# Patient Record
Sex: FEMALE | Race: WHITE | Employment: PART TIME | URBAN - METROPOLITAN AREA
[De-identification: names, ages, dates, MRNs, and addresses within clinical notes are randomized per-mention and may not be internally consistent; named-entity substitution may affect disease eponyms.]

---

## 2017-02-10 ENCOUNTER — OFFICE VISIT (OUTPATIENT)
Dept: FAMILY MEDICINE CLINIC | Age: 67
End: 2017-02-10

## 2017-02-10 VITALS
HEART RATE: 83 BPM | DIASTOLIC BLOOD PRESSURE: 74 MMHG | SYSTOLIC BLOOD PRESSURE: 122 MMHG | TEMPERATURE: 97.8 F | HEIGHT: 65 IN | WEIGHT: 200.6 LBS | BODY MASS INDEX: 33.42 KG/M2 | OXYGEN SATURATION: 97 %

## 2017-02-10 DIAGNOSIS — H60.501 ACUTE OTITIS EXTERNA OF RIGHT EAR, UNSPECIFIED TYPE: Primary | ICD-10-CM

## 2017-02-10 DIAGNOSIS — H10.9 CONJUNCTIVITIS OF BOTH EYES, UNSPECIFIED CONJUNCTIVITIS TYPE: ICD-10-CM

## 2017-02-10 PROCEDURE — G8484 FLU IMMUNIZE NO ADMIN: HCPCS | Performed by: NURSE PRACTITIONER

## 2017-02-10 PROCEDURE — G8427 DOCREV CUR MEDS BY ELIG CLIN: HCPCS | Performed by: NURSE PRACTITIONER

## 2017-02-10 PROCEDURE — G8417 CALC BMI ABV UP PARAM F/U: HCPCS | Performed by: NURSE PRACTITIONER

## 2017-02-10 PROCEDURE — 99213 OFFICE O/P EST LOW 20 MIN: CPT | Performed by: NURSE PRACTITIONER

## 2017-02-10 PROCEDURE — G8400 PT W/DXA NO RESULTS DOC: HCPCS | Performed by: NURSE PRACTITIONER

## 2017-02-10 PROCEDURE — 1090F PRES/ABSN URINE INCON ASSESS: CPT | Performed by: NURSE PRACTITIONER

## 2017-02-10 PROCEDURE — 3017F COLORECTAL CA SCREEN DOC REV: CPT | Performed by: NURSE PRACTITIONER

## 2017-02-10 PROCEDURE — 1123F ACP DISCUSS/DSCN MKR DOCD: CPT | Performed by: NURSE PRACTITIONER

## 2017-02-10 PROCEDURE — 4130F TOPICAL PREP RX AOE: CPT | Performed by: NURSE PRACTITIONER

## 2017-02-10 PROCEDURE — 4040F PNEUMOC VAC/ADMIN/RCVD: CPT | Performed by: NURSE PRACTITIONER

## 2017-02-10 PROCEDURE — 3014F SCREEN MAMMO DOC REV: CPT | Performed by: NURSE PRACTITIONER

## 2017-02-10 PROCEDURE — 1036F TOBACCO NON-USER: CPT | Performed by: NURSE PRACTITIONER

## 2017-02-10 RX ORDER — POLYMYXIN B SULFATE AND TRIMETHOPRIM 1; 10000 MG/ML; [USP'U]/ML
1 SOLUTION OPHTHALMIC EVERY 4 HOURS
Qty: 1 BOTTLE | Refills: 0 | Status: SHIPPED | OUTPATIENT
Start: 2017-02-10 | End: 2017-02-20

## 2017-02-10 RX ORDER — AMOXICILLIN AND CLAVULANATE POTASSIUM 875; 125 MG/1; MG/1
1 TABLET, FILM COATED ORAL 2 TIMES DAILY
Qty: 20 TABLET | Refills: 0 | Status: SHIPPED | OUTPATIENT
Start: 2017-02-10 | End: 2017-02-20

## 2017-02-10 RX ORDER — CIPROFLOXACIN AND DEXAMETHASONE 3; 1 MG/ML; MG/ML
4 SUSPENSION/ DROPS AURICULAR (OTIC) 2 TIMES DAILY
Qty: 1 BOTTLE | Refills: 0 | Status: SHIPPED | OUTPATIENT
Start: 2017-02-10 | End: 2017-02-17

## 2017-02-10 ASSESSMENT — ENCOUNTER SYMPTOMS
DIARRHEA: 0
STRIDOR: 0
RHINORRHEA: 0
PHOTOPHOBIA: 0
VOMITING: 0
EYE DISCHARGE: 1
SWOLLEN GLANDS: 0
EYE ITCHING: 0
SORE THROAT: 0
ABDOMINAL PAIN: 0
RESPIRATORY NEGATIVE: 1
EYE REDNESS: 1
DOUBLE VISION: 0
EYE PAIN: 0
COUGH: 0
ALLERGIC/IMMUNOLOGIC NEGATIVE: 1
GASTROINTESTINAL NEGATIVE: 1

## 2017-02-14 LAB
GRAM STAIN RESULT: ABNORMAL
ORGANISM: ABNORMAL
ORGANISM: ABNORMAL
WOUND/ABSCESS: ABNORMAL

## 2017-02-17 ENCOUNTER — PATIENT MESSAGE (OUTPATIENT)
Dept: FAMILY MEDICINE CLINIC | Age: 67
End: 2017-02-17

## 2017-02-18 ENCOUNTER — PATIENT MESSAGE (OUTPATIENT)
Dept: FAMILY MEDICINE CLINIC | Age: 67
End: 2017-02-18

## 2017-02-18 RX ORDER — CIPROFLOXACIN 500 MG/1
500 TABLET, FILM COATED ORAL 2 TIMES DAILY
Qty: 14 TABLET | Refills: 0 | Status: SHIPPED | OUTPATIENT
Start: 2017-02-18 | End: 2017-02-20 | Stop reason: SDUPTHER

## 2017-02-20 RX ORDER — CIPROFLOXACIN 500 MG/1
500 TABLET, FILM COATED ORAL 2 TIMES DAILY
Qty: 14 TABLET | Refills: 0 | Status: SHIPPED | OUTPATIENT
Start: 2017-02-20 | End: 2017-02-26 | Stop reason: SDUPTHER

## 2017-02-24 ENCOUNTER — PATIENT MESSAGE (OUTPATIENT)
Dept: FAMILY MEDICINE CLINIC | Age: 67
End: 2017-02-24

## 2017-02-26 RX ORDER — CIPROFLOXACIN 500 MG/1
500 TABLET, FILM COATED ORAL 2 TIMES DAILY
Qty: 14 TABLET | Refills: 0 | Status: SHIPPED | OUTPATIENT
Start: 2017-02-26 | End: 2017-03-07 | Stop reason: SDUPTHER

## 2017-03-06 ENCOUNTER — PATIENT MESSAGE (OUTPATIENT)
Dept: FAMILY MEDICINE CLINIC | Age: 67
End: 2017-03-06

## 2017-03-06 DIAGNOSIS — H60.319 CHRONIC DIFFUSE OTITIS EXTERNA, UNSPECIFIED LATERALITY: Primary | ICD-10-CM

## 2017-03-07 RX ORDER — CIPROFLOXACIN 500 MG/1
500 TABLET, FILM COATED ORAL 2 TIMES DAILY
Qty: 14 TABLET | Refills: 0 | Status: SHIPPED | OUTPATIENT
Start: 2017-03-07 | End: 2017-03-14

## 2017-04-10 ENCOUNTER — OFFICE VISIT (OUTPATIENT)
Dept: FAMILY MEDICINE CLINIC | Age: 67
End: 2017-04-10

## 2017-04-10 ENCOUNTER — OFFICE VISIT (OUTPATIENT)
Dept: ENT CLINIC | Age: 67
End: 2017-04-10

## 2017-04-10 VITALS
TEMPERATURE: 97.4 F | DIASTOLIC BLOOD PRESSURE: 73 MMHG | HEIGHT: 64 IN | WEIGHT: 199 LBS | BODY MASS INDEX: 33.97 KG/M2 | SYSTOLIC BLOOD PRESSURE: 118 MMHG

## 2017-04-10 VITALS
TEMPERATURE: 98.1 F | HEART RATE: 88 BPM | WEIGHT: 199.6 LBS | BODY MASS INDEX: 34.26 KG/M2 | SYSTOLIC BLOOD PRESSURE: 126 MMHG | OXYGEN SATURATION: 94 % | DIASTOLIC BLOOD PRESSURE: 86 MMHG

## 2017-04-10 DIAGNOSIS — Z11.4 SCREENING FOR HIV (HUMAN IMMUNODEFICIENCY VIRUS): ICD-10-CM

## 2017-04-10 DIAGNOSIS — Z11.59 NEED FOR HEPATITIS C SCREENING TEST: ICD-10-CM

## 2017-04-10 DIAGNOSIS — E53.8 VITAMIN B 12 DEFICIENCY: ICD-10-CM

## 2017-04-10 DIAGNOSIS — E78.2 MIXED HYPERLIPIDEMIA: ICD-10-CM

## 2017-04-10 DIAGNOSIS — H60.393 OTITIS EXTERNA, CHRONIC INFECTIVE, BILATERAL: Primary | ICD-10-CM

## 2017-04-10 DIAGNOSIS — G47.33 OBSTRUCTIVE SLEEP APNEA (ADULT) (PEDIATRIC): Primary | ICD-10-CM

## 2017-04-10 DIAGNOSIS — I10 ESSENTIAL HYPERTENSION: ICD-10-CM

## 2017-04-10 PROCEDURE — G8417 CALC BMI ABV UP PARAM F/U: HCPCS | Performed by: FAMILY MEDICINE

## 2017-04-10 PROCEDURE — 1036F TOBACCO NON-USER: CPT | Performed by: OTOLARYNGOLOGY

## 2017-04-10 PROCEDURE — G8400 PT W/DXA NO RESULTS DOC: HCPCS | Performed by: FAMILY MEDICINE

## 2017-04-10 PROCEDURE — 1123F ACP DISCUSS/DSCN MKR DOCD: CPT | Performed by: FAMILY MEDICINE

## 2017-04-10 PROCEDURE — 99214 OFFICE O/P EST MOD 30 MIN: CPT | Performed by: OTOLARYNGOLOGY

## 2017-04-10 PROCEDURE — 3017F COLORECTAL CA SCREEN DOC REV: CPT | Performed by: FAMILY MEDICINE

## 2017-04-10 PROCEDURE — 3014F SCREEN MAMMO DOC REV: CPT | Performed by: OTOLARYNGOLOGY

## 2017-04-10 PROCEDURE — 1036F TOBACCO NON-USER: CPT | Performed by: FAMILY MEDICINE

## 2017-04-10 PROCEDURE — 1090F PRES/ABSN URINE INCON ASSESS: CPT | Performed by: OTOLARYNGOLOGY

## 2017-04-10 PROCEDURE — G8400 PT W/DXA NO RESULTS DOC: HCPCS | Performed by: OTOLARYNGOLOGY

## 2017-04-10 PROCEDURE — G8427 DOCREV CUR MEDS BY ELIG CLIN: HCPCS | Performed by: FAMILY MEDICINE

## 2017-04-10 PROCEDURE — 4130F TOPICAL PREP RX AOE: CPT | Performed by: OTOLARYNGOLOGY

## 2017-04-10 PROCEDURE — 99214 OFFICE O/P EST MOD 30 MIN: CPT | Performed by: FAMILY MEDICINE

## 2017-04-10 PROCEDURE — 4040F PNEUMOC VAC/ADMIN/RCVD: CPT | Performed by: FAMILY MEDICINE

## 2017-04-10 PROCEDURE — 1123F ACP DISCUSS/DSCN MKR DOCD: CPT | Performed by: OTOLARYNGOLOGY

## 2017-04-10 PROCEDURE — G8417 CALC BMI ABV UP PARAM F/U: HCPCS | Performed by: OTOLARYNGOLOGY

## 2017-04-10 PROCEDURE — 4040F PNEUMOC VAC/ADMIN/RCVD: CPT | Performed by: OTOLARYNGOLOGY

## 2017-04-10 PROCEDURE — 3017F COLORECTAL CA SCREEN DOC REV: CPT | Performed by: OTOLARYNGOLOGY

## 2017-04-10 PROCEDURE — 3014F SCREEN MAMMO DOC REV: CPT | Performed by: FAMILY MEDICINE

## 2017-04-10 PROCEDURE — G8427 DOCREV CUR MEDS BY ELIG CLIN: HCPCS | Performed by: OTOLARYNGOLOGY

## 2017-04-10 PROCEDURE — 1090F PRES/ABSN URINE INCON ASSESS: CPT | Performed by: FAMILY MEDICINE

## 2017-04-12 ASSESSMENT — ENCOUNTER SYMPTOMS
RESPIRATORY NEGATIVE: 1
EYES NEGATIVE: 1
GASTROINTESTINAL NEGATIVE: 1

## 2017-04-26 DIAGNOSIS — Z11.59 NEED FOR HEPATITIS C SCREENING TEST: ICD-10-CM

## 2017-04-26 DIAGNOSIS — E78.2 MIXED HYPERLIPIDEMIA: ICD-10-CM

## 2017-04-26 DIAGNOSIS — E53.8 VITAMIN B 12 DEFICIENCY: ICD-10-CM

## 2017-04-26 DIAGNOSIS — Z11.4 SCREENING FOR HIV (HUMAN IMMUNODEFICIENCY VIRUS): ICD-10-CM

## 2017-04-26 DIAGNOSIS — I10 ESSENTIAL HYPERTENSION: ICD-10-CM

## 2017-04-26 LAB
A/G RATIO: 1.5 (ref 1.1–2.2)
ALBUMIN SERPL-MCNC: 4.4 G/DL (ref 3.4–5)
ALP BLD-CCNC: 93 U/L (ref 40–129)
ALT SERPL-CCNC: 22 U/L (ref 10–40)
ANION GAP SERPL CALCULATED.3IONS-SCNC: 17 MMOL/L (ref 3–16)
AST SERPL-CCNC: 20 U/L (ref 15–37)
BILIRUB SERPL-MCNC: 0.5 MG/DL (ref 0–1)
BUN BLDV-MCNC: 20 MG/DL (ref 7–20)
CALCIUM SERPL-MCNC: 9.9 MG/DL (ref 8.3–10.6)
CHLORIDE BLD-SCNC: 94 MMOL/L (ref 99–110)
CHOLESTEROL, TOTAL: 195 MG/DL (ref 0–199)
CO2: 28 MMOL/L (ref 21–32)
CREAT SERPL-MCNC: 0.9 MG/DL (ref 0.6–1.2)
FOLATE: 12.83 NG/ML (ref 4.78–24.2)
GFR AFRICAN AMERICAN: >60
GFR NON-AFRICAN AMERICAN: >60
GLOBULIN: 2.9 G/DL
GLUCOSE BLD-MCNC: 109 MG/DL (ref 70–99)
HDLC SERPL-MCNC: 53 MG/DL (ref 40–60)
HEPATITIS C ANTIBODY INTERPRETATION: NORMAL
LDL CHOLESTEROL CALCULATED: 114 MG/DL
POTASSIUM SERPL-SCNC: 4.1 MMOL/L (ref 3.5–5.1)
SODIUM BLD-SCNC: 139 MMOL/L (ref 136–145)
T4 FREE: 1.2 NG/DL (ref 0.9–1.8)
TOTAL PROTEIN: 7.3 G/DL (ref 6.4–8.2)
TRIGL SERPL-MCNC: 141 MG/DL (ref 0–150)
TSH SERPL DL<=0.05 MIU/L-ACNC: 1.8 UIU/ML (ref 0.27–4.2)
VITAMIN B-12: 430 PG/ML (ref 211–911)
VLDLC SERPL CALC-MCNC: 28 MG/DL

## 2017-04-28 LAB — HIV-1 AND HIV-2 ANTIBODIES: NEGATIVE

## 2017-05-24 ENCOUNTER — PATIENT MESSAGE (OUTPATIENT)
Dept: FAMILY MEDICINE CLINIC | Age: 67
End: 2017-05-24

## 2017-05-24 RX ORDER — ESCITALOPRAM OXALATE 20 MG/1
20 TABLET ORAL DAILY
Qty: 90 TABLET | Refills: 1 | Status: SHIPPED | OUTPATIENT
Start: 2017-05-24 | End: 2017-05-28 | Stop reason: SDUPTHER

## 2017-05-28 ENCOUNTER — PATIENT MESSAGE (OUTPATIENT)
Dept: FAMILY MEDICINE CLINIC | Age: 67
End: 2017-05-28

## 2017-05-28 RX ORDER — ESCITALOPRAM OXALATE 20 MG/1
20 TABLET ORAL DAILY
Qty: 90 TABLET | Refills: 1 | Status: SHIPPED | OUTPATIENT
Start: 2017-05-28 | End: 2019-02-22

## 2017-06-12 DIAGNOSIS — I10 ESSENTIAL HYPERTENSION: Primary | ICD-10-CM

## 2017-06-12 DIAGNOSIS — K21.9 GASTROESOPHAGEAL REFLUX DISEASE, ESOPHAGITIS PRESENCE NOT SPECIFIED: ICD-10-CM

## 2017-06-12 RX ORDER — TRIAMTERENE AND HYDROCHLOROTHIAZIDE 75; 50 MG/1; MG/1
1 TABLET ORAL DAILY
Qty: 90 TABLET | Refills: 1 | Status: SHIPPED | OUTPATIENT
Start: 2017-06-12 | End: 2017-11-24 | Stop reason: SDUPTHER

## 2017-06-12 RX ORDER — OMEPRAZOLE 20 MG/1
20 CAPSULE, DELAYED RELEASE ORAL DAILY
Qty: 90 CAPSULE | Refills: 1 | Status: SHIPPED | OUTPATIENT
Start: 2017-06-12 | End: 2017-11-24 | Stop reason: SDUPTHER

## 2017-06-12 RX ORDER — NEBIVOLOL 5 MG/1
5 TABLET ORAL DAILY
Qty: 90 TABLET | Refills: 1 | Status: SHIPPED | OUTPATIENT
Start: 2017-06-12 | End: 2017-11-24 | Stop reason: SDUPTHER

## 2017-06-12 RX ORDER — AMLODIPINE BESYLATE 10 MG/1
10 TABLET ORAL DAILY
Qty: 90 TABLET | Refills: 1 | Status: SHIPPED | OUTPATIENT
Start: 2017-06-12 | End: 2017-11-24 | Stop reason: SDUPTHER

## 2017-08-28 ENCOUNTER — OFFICE VISIT (OUTPATIENT)
Dept: FAMILY MEDICINE CLINIC | Age: 67
End: 2017-08-28

## 2017-08-28 VITALS
HEIGHT: 64 IN | BODY MASS INDEX: 33.29 KG/M2 | WEIGHT: 195 LBS | TEMPERATURE: 97.6 F | OXYGEN SATURATION: 97 % | DIASTOLIC BLOOD PRESSURE: 86 MMHG | SYSTOLIC BLOOD PRESSURE: 126 MMHG | HEART RATE: 81 BPM

## 2017-08-28 DIAGNOSIS — E53.8 VITAMIN B 12 DEFICIENCY: ICD-10-CM

## 2017-08-28 DIAGNOSIS — F32.0 MILD SINGLE CURRENT EPISODE OF MAJOR DEPRESSIVE DISORDER (HCC): ICD-10-CM

## 2017-08-28 DIAGNOSIS — Z23 NEED FOR PNEUMOCOCCAL VACCINATION: ICD-10-CM

## 2017-08-28 DIAGNOSIS — F41.9 ANXIETY: ICD-10-CM

## 2017-08-28 DIAGNOSIS — B00.9 HERPES SIMPLEX INFECTION: ICD-10-CM

## 2017-08-28 DIAGNOSIS — F51.04 PSYCHOPHYSIOLOGICAL INSOMNIA: ICD-10-CM

## 2017-08-28 DIAGNOSIS — R73.9 HYPERGLYCEMIA: ICD-10-CM

## 2017-08-28 DIAGNOSIS — Z00.00 ROUTINE GENERAL MEDICAL EXAMINATION AT A HEALTH CARE FACILITY: Primary | ICD-10-CM

## 2017-08-28 DIAGNOSIS — Z13.820 SCREENING FOR OSTEOPOROSIS: ICD-10-CM

## 2017-08-28 DIAGNOSIS — Z12.11 SCREEN FOR COLON CANCER: ICD-10-CM

## 2017-08-28 DIAGNOSIS — E55.9 VITAMIN D DEFICIENCY: ICD-10-CM

## 2017-08-28 DIAGNOSIS — L43.8 ORAL LICHEN PLANUS: ICD-10-CM

## 2017-08-28 DIAGNOSIS — E78.2 MIXED HYPERLIPIDEMIA: ICD-10-CM

## 2017-08-28 DIAGNOSIS — M15.9 GENERALIZED OSTEOARTHROSIS, INVOLVING MULTIPLE SITES: ICD-10-CM

## 2017-08-28 DIAGNOSIS — I10 ESSENTIAL HYPERTENSION: ICD-10-CM

## 2017-08-28 PROCEDURE — 90732 PPSV23 VACC 2 YRS+ SUBQ/IM: CPT | Performed by: FAMILY MEDICINE

## 2017-08-28 PROCEDURE — G0009 ADMIN PNEUMOCOCCAL VACCINE: HCPCS | Performed by: FAMILY MEDICINE

## 2017-08-28 PROCEDURE — G0438 PPPS, INITIAL VISIT: HCPCS | Performed by: FAMILY MEDICINE

## 2017-08-28 ASSESSMENT — ENCOUNTER SYMPTOMS
EYES NEGATIVE: 1
GASTROINTESTINAL NEGATIVE: 1
ALLERGIC/IMMUNOLOGIC NEGATIVE: 1
RESPIRATORY NEGATIVE: 1

## 2017-10-02 ENCOUNTER — TELEPHONE (OUTPATIENT)
Dept: FAMILY MEDICINE CLINIC | Age: 67
End: 2017-10-02

## 2017-10-02 NOTE — TELEPHONE ENCOUNTER
Pt called stating she hit her eye with her work [employer] and says theres a spot that looks like \"is bleeding\"  Appt made for tomorrow at 4:15  Was unable to come in any earlier

## 2017-10-06 DIAGNOSIS — F32.0 MILD SINGLE CURRENT EPISODE OF MAJOR DEPRESSIVE DISORDER (HCC): ICD-10-CM

## 2017-10-06 DIAGNOSIS — I10 ESSENTIAL HYPERTENSION: ICD-10-CM

## 2017-10-06 DIAGNOSIS — E55.9 VITAMIN D DEFICIENCY: ICD-10-CM

## 2017-10-06 DIAGNOSIS — R73.9 HYPERGLYCEMIA: ICD-10-CM

## 2017-10-06 DIAGNOSIS — E78.2 MIXED HYPERLIPIDEMIA: ICD-10-CM

## 2017-10-06 DIAGNOSIS — E53.8 VITAMIN B 12 DEFICIENCY: ICD-10-CM

## 2017-10-06 LAB
A/G RATIO: 1.6 (ref 1.1–2.2)
ALBUMIN SERPL-MCNC: 4.2 G/DL (ref 3.4–5)
ALP BLD-CCNC: 98 U/L (ref 40–129)
ALT SERPL-CCNC: 23 U/L (ref 10–40)
ANION GAP SERPL CALCULATED.3IONS-SCNC: 17 MMOL/L (ref 3–16)
AST SERPL-CCNC: 22 U/L (ref 15–37)
BILIRUB SERPL-MCNC: 0.5 MG/DL (ref 0–1)
BUN BLDV-MCNC: 11 MG/DL (ref 7–20)
CALCIUM SERPL-MCNC: 9.6 MG/DL (ref 8.3–10.6)
CHLORIDE BLD-SCNC: 100 MMOL/L (ref 99–110)
CHOLESTEROL, TOTAL: 181 MG/DL (ref 0–199)
CO2: 29 MMOL/L (ref 21–32)
CREAT SERPL-MCNC: 0.7 MG/DL (ref 0.6–1.2)
GFR AFRICAN AMERICAN: >60
GFR NON-AFRICAN AMERICAN: >60
GLOBULIN: 2.7 G/DL
GLUCOSE BLD-MCNC: 105 MG/DL (ref 70–99)
HDLC SERPL-MCNC: 51 MG/DL (ref 40–60)
LDL CHOLESTEROL CALCULATED: 100 MG/DL
POTASSIUM SERPL-SCNC: 5.1 MMOL/L (ref 3.5–5.1)
SODIUM BLD-SCNC: 146 MMOL/L (ref 136–145)
TOTAL PROTEIN: 6.9 G/DL (ref 6.4–8.2)
TRIGL SERPL-MCNC: 152 MG/DL (ref 0–150)
TSH SERPL DL<=0.05 MIU/L-ACNC: 1.34 UIU/ML (ref 0.27–4.2)
VITAMIN B-12: 374 PG/ML (ref 211–911)
VITAMIN D 25-HYDROXY: 44.5 NG/ML
VLDLC SERPL CALC-MCNC: 30 MG/DL

## 2017-10-07 LAB
ESTIMATED AVERAGE GLUCOSE: 122.6 MG/DL
HBA1C MFR BLD: 5.9 %

## 2017-11-08 ENCOUNTER — TELEPHONE (OUTPATIENT)
Dept: FAMILY MEDICINE CLINIC | Age: 67
End: 2017-11-08

## 2017-11-08 DIAGNOSIS — M81.0 POST-MENOPAUSAL OSTEOPOROSIS: Primary | ICD-10-CM

## 2017-11-17 ENCOUNTER — HOSPITAL ENCOUNTER (OUTPATIENT)
Dept: GENERAL RADIOLOGY | Age: 67
Discharge: HOME | End: 2017-11-17
Attending: FAMILY MEDICINE | Admitting: FAMILY MEDICINE

## 2017-11-17 DIAGNOSIS — M81.0 AGE-RELATED OSTEOPOROSIS WITHOUT CURRENT PATHOLOGICAL FRACTURE: ICD-10-CM

## 2017-11-17 DIAGNOSIS — M81.0 POST-MENOPAUSAL OSTEOPOROSIS: ICD-10-CM

## 2017-11-24 DIAGNOSIS — K21.9 GASTROESOPHAGEAL REFLUX DISEASE, ESOPHAGITIS PRESENCE NOT SPECIFIED: ICD-10-CM

## 2017-11-24 DIAGNOSIS — I10 ESSENTIAL HYPERTENSION: ICD-10-CM

## 2017-11-25 RX ORDER — TRIAMTERENE AND HYDROCHLOROTHIAZIDE 75; 50 MG/1; MG/1
1 TABLET ORAL DAILY
Qty: 90 TABLET | Refills: 1 | Status: ON HOLD | OUTPATIENT
Start: 2017-11-25 | End: 2018-01-22 | Stop reason: HOSPADM

## 2017-11-25 RX ORDER — NEBIVOLOL 5 MG/1
5 TABLET ORAL DAILY
Qty: 90 TABLET | Refills: 1 | Status: SHIPPED | OUTPATIENT
Start: 2017-11-25 | End: 2018-08-03 | Stop reason: SDUPTHER

## 2017-11-25 RX ORDER — AMLODIPINE BESYLATE 10 MG/1
10 TABLET ORAL DAILY
Qty: 90 TABLET | Refills: 1 | Status: SHIPPED | OUTPATIENT
Start: 2017-11-25 | End: 2018-08-03 | Stop reason: SDUPTHER

## 2017-11-25 RX ORDER — OMEPRAZOLE 20 MG/1
20 CAPSULE, DELAYED RELEASE ORAL DAILY
Qty: 90 CAPSULE | Refills: 1 | Status: SHIPPED | OUTPATIENT
Start: 2017-11-25 | End: 2018-08-03 | Stop reason: SDUPTHER

## 2018-01-20 PROBLEM — S82.892A CLOSED LEFT ANKLE FRACTURE, INITIAL ENCOUNTER: Status: ACTIVE | Noted: 2018-01-20

## 2018-01-21 PROBLEM — S93.06XA: Status: ACTIVE | Noted: 2018-01-21

## 2018-01-21 PROBLEM — S52.122A RADIAL HEAD FRACTURE, CLOSED, LEFT, CLOSED, INITIAL ENCOUNTER: Status: ACTIVE | Noted: 2018-01-21

## 2018-01-21 PROBLEM — S82.853A: Status: ACTIVE | Noted: 2018-01-21

## 2018-01-22 PROBLEM — S82.892A CLOSED LEFT ANKLE FRACTURE, INITIAL ENCOUNTER: Status: ACTIVE | Noted: 2018-01-22

## 2018-01-30 ENCOUNTER — TELEPHONE (OUTPATIENT)
Dept: ORTHOPEDIC SURGERY | Age: 68
End: 2018-01-30

## 2018-01-30 RX ORDER — VALACYCLOVIR HYDROCHLORIDE 1 G/1
TABLET, FILM COATED ORAL
Qty: 36 TABLET | Refills: 1 | Status: SHIPPED | OUTPATIENT
Start: 2018-01-30 | End: 2019-02-22 | Stop reason: SDUPTHER

## 2018-01-30 NOTE — TELEPHONE ENCOUNTER
Spoke to patient. Informed her to be nonweightbearing. Continue to rest ice and elevate. Follow-up in office in next 1-2 weeks.

## 2018-01-31 RX ORDER — ONDANSETRON 4 MG/1
4 TABLET, FILM COATED ORAL EVERY 8 HOURS PRN
Qty: 20 TABLET | Refills: 0 | Status: SHIPPED | OUTPATIENT
Start: 2018-01-31 | End: 2019-02-22

## 2018-01-31 RX ORDER — CYCLOBENZAPRINE HCL 10 MG
10 TABLET ORAL 2 TIMES DAILY PRN
Qty: 60 TABLET | Refills: 0 | Status: SHIPPED | OUTPATIENT
Start: 2018-01-31 | End: 2018-05-31 | Stop reason: SDUPTHER

## 2018-02-05 ENCOUNTER — OFFICE VISIT (OUTPATIENT)
Dept: FAMILY MEDICINE CLINIC | Age: 68
End: 2018-02-05

## 2018-02-05 VITALS
WEIGHT: 195 LBS | HEART RATE: 60 BPM | TEMPERATURE: 98.6 F | BODY MASS INDEX: 34.54 KG/M2 | SYSTOLIC BLOOD PRESSURE: 136 MMHG | DIASTOLIC BLOOD PRESSURE: 86 MMHG | OXYGEN SATURATION: 99 %

## 2018-02-05 DIAGNOSIS — E53.8 VITAMIN B 12 DEFICIENCY: ICD-10-CM

## 2018-02-05 DIAGNOSIS — E78.2 MIXED HYPERLIPIDEMIA: ICD-10-CM

## 2018-02-05 DIAGNOSIS — K14.6 BURNING MOUTH SYNDROME: ICD-10-CM

## 2018-02-05 DIAGNOSIS — F32.0 MILD SINGLE CURRENT EPISODE OF MAJOR DEPRESSIVE DISORDER (HCC): Primary | ICD-10-CM

## 2018-02-05 DIAGNOSIS — I10 ESSENTIAL HYPERTENSION: ICD-10-CM

## 2018-02-05 PROCEDURE — G8417 CALC BMI ABV UP PARAM F/U: HCPCS | Performed by: FAMILY MEDICINE

## 2018-02-05 PROCEDURE — G8427 DOCREV CUR MEDS BY ELIG CLIN: HCPCS | Performed by: FAMILY MEDICINE

## 2018-02-05 PROCEDURE — 4040F PNEUMOC VAC/ADMIN/RCVD: CPT | Performed by: FAMILY MEDICINE

## 2018-02-05 PROCEDURE — 3014F SCREEN MAMMO DOC REV: CPT | Performed by: FAMILY MEDICINE

## 2018-02-05 PROCEDURE — 1036F TOBACCO NON-USER: CPT | Performed by: FAMILY MEDICINE

## 2018-02-05 PROCEDURE — G8399 PT W/DXA RESULTS DOCUMENT: HCPCS | Performed by: FAMILY MEDICINE

## 2018-02-05 PROCEDURE — 3017F COLORECTAL CA SCREEN DOC REV: CPT | Performed by: FAMILY MEDICINE

## 2018-02-05 PROCEDURE — 1123F ACP DISCUSS/DSCN MKR DOCD: CPT | Performed by: FAMILY MEDICINE

## 2018-02-05 PROCEDURE — 1090F PRES/ABSN URINE INCON ASSESS: CPT | Performed by: FAMILY MEDICINE

## 2018-02-05 PROCEDURE — 1111F DSCHRG MED/CURRENT MED MERGE: CPT | Performed by: FAMILY MEDICINE

## 2018-02-05 PROCEDURE — 99214 OFFICE O/P EST MOD 30 MIN: CPT | Performed by: FAMILY MEDICINE

## 2018-02-05 PROCEDURE — G8482 FLU IMMUNIZE ORDER/ADMIN: HCPCS | Performed by: FAMILY MEDICINE

## 2018-02-07 ENCOUNTER — OFFICE VISIT (OUTPATIENT)
Dept: ORTHOPEDIC SURGERY | Age: 68
End: 2018-02-07

## 2018-02-07 DIAGNOSIS — M25.522 LEFT ELBOW PAIN: ICD-10-CM

## 2018-02-07 DIAGNOSIS — R52 PAIN: ICD-10-CM

## 2018-02-07 DIAGNOSIS — S62.112A TRIQUETRAL CHIP FRACTURE, LEFT, CLOSED, INITIAL ENCOUNTER: Primary | ICD-10-CM

## 2018-02-07 DIAGNOSIS — M25.532 LEFT WRIST PAIN: ICD-10-CM

## 2018-02-07 DIAGNOSIS — S82.892A CLOSED LEFT ANKLE FRACTURE, INITIAL ENCOUNTER: ICD-10-CM

## 2018-02-07 DIAGNOSIS — S52.122A RADIAL HEAD FRACTURE, CLOSED, LEFT, CLOSED, INITIAL ENCOUNTER: ICD-10-CM

## 2018-02-07 PROBLEM — F32.0 MILD SINGLE CURRENT EPISODE OF MAJOR DEPRESSIVE DISORDER (HCC): Status: ACTIVE | Noted: 2018-02-07

## 2018-02-07 PROCEDURE — 29515 APPLICATION SHORT LEG SPLINT: CPT | Performed by: PHYSICIAN ASSISTANT

## 2018-02-07 PROCEDURE — 99024 POSTOP FOLLOW-UP VISIT: CPT | Performed by: PHYSICIAN ASSISTANT

## 2018-02-07 PROCEDURE — L3908 WHO COCK-UP NONMOLDE PRE OTS: HCPCS | Performed by: PHYSICIAN ASSISTANT

## 2018-02-07 PROCEDURE — 24650 CLTX RDL HEAD/NCK FX WO MNPJ: CPT | Performed by: PHYSICIAN ASSISTANT

## 2018-02-07 PROCEDURE — A4570 SPLINT: HCPCS | Performed by: PHYSICIAN ASSISTANT

## 2018-02-07 RX ORDER — OXYCODONE HYDROCHLORIDE AND ACETAMINOPHEN 5; 325 MG/1; MG/1
1 TABLET ORAL EVERY 6 HOURS PRN
Qty: 28 TABLET | Refills: 0 | Status: SHIPPED | OUTPATIENT
Start: 2018-02-07 | End: 2018-02-14

## 2018-02-07 ASSESSMENT — ENCOUNTER SYMPTOMS
RESPIRATORY NEGATIVE: 1
EYES NEGATIVE: 1
GASTROINTESTINAL NEGATIVE: 1

## 2018-02-07 NOTE — PROGRESS NOTES
capsule 0    aspirin 325 MG tablet Take 325 mg by mouth daily      amLODIPine (NORVASC) 10 MG tablet Take 1 tablet by mouth daily 90 tablet 1    omeprazole (PRILOSEC) 20 MG delayed release capsule Take 1 capsule by mouth Daily 90 capsule 1    nebivolol (BYSTOLIC) 5 MG tablet Take 1 tablet by mouth daily 90 tablet 1    escitalopram (LEXAPRO) 20 MG tablet Take 1 tablet by mouth daily 90 tablet 1    silver sulfADIAZINE (SILVADENE) 1 % cream Apply topically daily. 100 g 2    Cholecalciferol (VITAMIN D) 2000 UNITS CAPS capsule Take 1 capsule by mouth Daily.  Calcium Carbonate-Vitamin D (CALTRATE 600+D) 600-400 MG-UNIT TABS Take 1 tablet by mouth 2 times daily.  multivitamin (THERAGRAN) per tablet Take 1 tablet by mouth daily. Past Medical History:   Diagnosis Date    Anxiety     Herpes     opthalmic    Hypertension     Insomnia     Radial head fracture, closed, left, closed, initial encounter 1/21/2018     Past Surgical History:   Procedure Laterality Date    ANKLE SURGERY Left 01/21/2018    LEFT ANKLE OPEN REDUCTION INTERNAL FIXATION    BREAST BIOPSY      ELBOW SURGERY      right     HYSTERECTOMY      HYSTERECTOMY, TOTAL ABDOMINAL      SHOULDER ARTHROSCOPY      WISDOM TOOTH EXTRACTION       Family History   Problem Relation Age of Onset    Arthritis Mother     Heart Disease Mother     Heart Disease Father     High Blood Pressure Father     Cancer Father      liver, bladder    Arthritis Father     Heart Disease Sister     High Blood Pressure Brother     Arthritis Maternal Aunt     High Blood Pressure Brother      Social History     Social History    Marital status:      Spouse name: N/A    Number of children: N/A    Years of education: N/A     Occupational History    Not on file.      Social History Main Topics    Smoking status: Former Smoker     Packs/day: 0.75     Years: 20.00     Types: Cigarettes    Smokeless tobacco: Former User    Alcohol use No    Drug use: No    Sexual activity: Not on file     Other Topics Concern    Not on file     Social History Narrative    No narrative on file         Any recent diagnostic tests, hospital reports, office notes or consultation letters were reviewed prior to and during the visit. Review of Systems   Constitutional: Negative. HENT: Negative. Eyes: Negative. Respiratory: Negative. Cardiovascular: Negative. Gastrointestinal: Negative. Genitourinary: Negative. Musculoskeletal: Negative. Psychiatric/Behavioral: Negative. Physical Exam   Constitutional: She appears well-developed and well-nourished. She appears distressed. Neck: Normal range of motion. Neck supple. Normal carotid pulses, no hepatojugular reflux and no JVD present. Carotid bruit is not present. No tracheal deviation present. No thyromegaly present. Cardiovascular: Normal rate, regular rhythm, S2 normal, normal heart sounds and intact distal pulses. PMI is not displaced. Exam reveals no gallop and no friction rub. No murmur heard. Pulses:       Carotid pulses are 2+ on the right side, and 2+ on the left side. Dorsalis pedis pulses are 2+ on the right side, and 2+ on the left side. Posterior tibial pulses are 2+ on the right side, and 2+ on the left side. Pulmonary/Chest: Effort normal and breath sounds normal. No stridor. No respiratory distress. She has no wheezes. She has no rales. She exhibits no tenderness. Abdominal: Soft. Bowel sounds are normal. She exhibits no distension and no mass. There is no tenderness. There is no rebound and no guarding. Musculoskeletal:        Right ankle: She exhibits no swelling. Left ankle: She exhibits no swelling. Lymphadenopathy:     She has no cervical adenopathy. Skin: She is not diaphoretic. Psychiatric: She has a normal mood and affect.  Her behavior is normal. Judgment and thought content normal.         1. Mild single current episode of major depressive disorder ([facility] [address]  Condition stable continue the medications, treatments, will check labs as appropriate       2. Mixed hyperlipidemia  Condition stable continue the medications, treatments, will check labs as appropriate       The patient received counseling on the following healthy behaviors: nutrition, exercise, medication adherence and decrease in alcohol consumption    Patient given educational materials on Hyperlipidemia and Nutrition if appropriate    I have instructed the patient to complete a self tracking handout on diet and instructed them to bring it with them to the  next appointment. Discussed use, benefit, and side effects of prescribed medications. Barriers to medication compliance addressed. All patient questions answered. Pt voiced understanding. 3. Vitamin B 12 deficiency  Condition stable continue the medications, treatments, will check labs as appropriate       4. Burning mouth syndrome Condition stable continue the medications, treatments, will check labs as appropriate       5. Essential hypertension  Condition stable continue the medications, treatments, will check labs as appropriate       The patient received counseling on the following healthy behaviors: nutrition, exercise, medication adherence and decrease in alcohol consumption    Patient given educational materials on Nutrition, Exercise and Hypertension as appropriate. I have instructed the patient to complete a self tracking handout on Blood Pressures  and instructed them to bring it with them to the next appointment. Discussed use, benefit, and side effects of prescribed medications. Barriers to medication compliance addressed. All patient questions answered. Pt voiced understanding.                      Shelby Black MD

## 2018-02-07 NOTE — PROGRESS NOTES
oblique views. Triquetrum fracture    3 views of the left elbow also ordered today reviewed. AP, lateral, and radial head views. Nondisplaced radial head fracture. Assessment/plan:   1. Radial head fracture  2. Left triquetrum fracture  3. Status post ORIF left ankle with syndesmosis repair    For the left ankle patient is placed into a light dressing and a contour boot. Strict nonweightbearing. Rest, ice, and elevate. Follow-up in the office in one week for 3 views of the ankle followed by wound inspection. Hopefully suture removal at that time. For her left elbow we have encouraged gentle range of motion and nonweightbearing. For the left wrist she is placed into a wrist brace. She will remove it several times a day and work on gentle range of motion. At her next visit please take 3 views of the left elbow and left ankle. Procedures    NH SPLINT    NH APPLY LOWER LEG SPLINT    NH CLOSED RX RADIAL HEAD/NECK FX    Wrist Pro/Primo Wrist Cock-up Brace     Patient was prescribed a Breg Wrist Pro. The left wrist will require stabilization / immobilization from this semi-rigid / rigid orthosis to improve their function. The orthosis will assist in protecting the affected area, provide functional support and facilitate healing. The patient was educated and fit by a healthcare professional with expert knowledge and specialization in brace application while under the direct supervision of the treating physician. Verbal and written instructions for the use of and application of this item were provided. They were instructed to contact the office immediately should the brace result in increased pain, decreased sensation, increased swelling or worsening of the condition.

## 2018-02-14 ENCOUNTER — OFFICE VISIT (OUTPATIENT)
Dept: ORTHOPEDIC SURGERY | Age: 68
End: 2018-02-14

## 2018-02-14 DIAGNOSIS — S52.122A RADIAL HEAD FRACTURE, CLOSED, LEFT, CLOSED, INITIAL ENCOUNTER: ICD-10-CM

## 2018-02-14 DIAGNOSIS — M25.572 LEFT ANKLE PAIN, UNSPECIFIED CHRONICITY: Primary | ICD-10-CM

## 2018-02-14 DIAGNOSIS — M25.522 LEFT ELBOW PAIN: ICD-10-CM

## 2018-02-14 DIAGNOSIS — S82.853D CLOSED DISPLACED TRIMALLEOLAR FRACTURE WITH ROUTINE HEALING, UNSPECIFIED LATERALITY, SUBSEQUENT ENCOUNTER: ICD-10-CM

## 2018-02-14 PROCEDURE — L4361 PNEUMA/VAC WALK BOOT PRE OTS: HCPCS | Performed by: PHYSICIAN ASSISTANT

## 2018-02-14 PROCEDURE — 99024 POSTOP FOLLOW-UP VISIT: CPT | Performed by: PHYSICIAN ASSISTANT

## 2018-02-15 NOTE — PROGRESS NOTES
inversion and eversion. She also do range of motion of the knee. I have encouraged her to continue to work on elbow range of motion. I recommend rest, ice, and oral anti-inflammatories for swelling control. We'll go ahead and get her placed into a pneumatic walking boot today but she is to remain nonweightbearing. We'll see her back in approximately 3 weeks for repeat clinical exam and re-x-ray. Procedures    OTS FP Pneumatic Walking Boot Tall DJO     Patient was prescribed a Lyndle Triplett Tall Walking Boot. The left ANKLE will require stabilization / immobilization from this semi-rigid / rigid orthosis to improve their function. The orthosis will assist in protecting the affected area, provide functional support and facilitate healing. The patient was educated and fit by a healthcare professional with expert knowledge and specialization in brace application while under the direct supervision of the physician. Verbal and written instructions for the use of and application of this item were provided. They were instructed to contact the office immediately should the brace result in increased pain, decreased sensation, increased swelling or worsening of the condition. 03:05

## 2018-02-22 ENCOUNTER — OFFICE VISIT (OUTPATIENT)
Dept: ORTHOPEDIC SURGERY | Age: 68
End: 2018-02-22

## 2018-02-22 VITALS — WEIGHT: 195.11 LBS | HEIGHT: 63 IN | BODY MASS INDEX: 34.57 KG/M2

## 2018-02-22 DIAGNOSIS — S82.892A CLOSED LEFT ANKLE FRACTURE, INITIAL ENCOUNTER: Primary | ICD-10-CM

## 2018-02-22 PROCEDURE — 99024 POSTOP FOLLOW-UP VISIT: CPT | Performed by: ORTHOPAEDIC SURGERY

## 2018-02-22 NOTE — PROGRESS NOTES
Patient is four-week status post ORIF of left trimalleolar ankle fracture dislocation. Patient and family were concerned today because there is a purplish discoloration to her left lower extremity. Since surgery, her swelling is gone down significantly. She did have fracture blisters which are healed. She denies fevers or chills. Her pain is gradually getting better as well. Pain Assessment  Location of Pain: Ankle  Location Modifiers: Left  Severity of Pain: 2  Quality of Pain: Aching, Dull  Duration of Pain: A few hours  Frequency of Pain: Several times daily  Aggravating Factors: Bending, Stretching  Limiting Behavior: Some  Relieving Factors: Rest  Result of Injury: No  Work-Related Injury: No  Are there other pain locations you wish to document?: No]    On physical exam, the patient has findings consistent with venous stasis. There is no evidence of infection, she has good capillary refill, and good pulses. Her incisions of the left very nicely, her fracture blisters are also healing. At this time, reassured the patient patient's daughter that the purplish discoloration is more related to venous stasis. I recommend rest, ice, compression, elevation and local wound care.   Return to clinic 3-4 weeks for repeat x-rays and clinical exam.

## 2018-03-21 ENCOUNTER — OFFICE VISIT (OUTPATIENT)
Dept: ORTHOPEDIC SURGERY | Age: 68
End: 2018-03-21

## 2018-03-21 DIAGNOSIS — S82.853D CLOSED DISPLACED TRIMALLEOLAR FRACTURE WITH ROUTINE HEALING, UNSPECIFIED LATERALITY, SUBSEQUENT ENCOUNTER: ICD-10-CM

## 2018-03-21 DIAGNOSIS — S82.892A CLOSED LEFT ANKLE FRACTURE, INITIAL ENCOUNTER: Primary | ICD-10-CM

## 2018-03-21 PROCEDURE — 99024 POSTOP FOLLOW-UP VISIT: CPT | Performed by: PHYSICIAN ASSISTANT

## 2018-04-06 ENCOUNTER — OFFICE VISIT (OUTPATIENT)
Dept: ORTHOPEDIC SURGERY | Age: 68
End: 2018-04-06

## 2018-04-06 DIAGNOSIS — M25.572 LEFT ANKLE PAIN, UNSPECIFIED CHRONICITY: Primary | ICD-10-CM

## 2018-04-06 DIAGNOSIS — S82.892A CLOSED LEFT ANKLE FRACTURE, INITIAL ENCOUNTER: ICD-10-CM

## 2018-04-06 DIAGNOSIS — S82.852D CLOSED DISPLACED TRIMALLEOLAR FRACTURE OF LEFT ANKLE WITH ROUTINE HEALING, SUBSEQUENT ENCOUNTER: ICD-10-CM

## 2018-04-06 PROCEDURE — 99024 POSTOP FOLLOW-UP VISIT: CPT | Performed by: PHYSICIAN ASSISTANT

## 2018-04-10 ENCOUNTER — TELEPHONE (OUTPATIENT)
Dept: ORTHOPEDIC SURGERY | Age: 68
End: 2018-04-10

## 2018-04-23 ENCOUNTER — HOSPITAL ENCOUNTER (OUTPATIENT)
Dept: PHYSICAL THERAPY | Age: 68
Discharge: HOME | End: 2018-04-30
Admitting: PHYSICIAN ASSISTANT

## 2018-04-25 ENCOUNTER — HOSPITAL ENCOUNTER (OUTPATIENT)
Dept: PHYSICAL THERAPY | Age: 68
Discharge: HOME | End: 2018-04-26

## 2018-04-25 NOTE — FLOWSHEET NOTE
BAPS mark. Inv/ev 2# 1' each New     4 way ankle theraband PF/DF/EV/INV- red TB x30           Wt. Shifts SP, FP 10 x 5\" each New                                               Manuals: joint mobs gr I&II talocrural and subtalar 10'     - HEP provided and a copy can be found in the media file. Therapeutic Exercise and NMR EXR  [x] (43834) Provided verbal/tactile cueing for activities related to strengthening, flexibility, endurance, ROM for improvements in LE, proximal hip, and core control with self care, mobility, lifting, ambulation.  [] (95157) Provided verbal/tactile cueing for activities related to improving balance, coordination, kinesthetic sense, posture, motor skill, proprioception  to assist with LE, proximal hip, and core control in self care, mobility, lifting, ambulation and eccentric single leg control.      NMR and Therapeutic Activities:    [] (92672 or 56288) Provided verbal/tactile cueing for activities related to improving balance, coordination, kinesthetic sense, posture, motor skill, proprioception and motor activation to allow for proper function of core, proximal hip and LE with self care and ADLs  [] (80263) Gait Re-education- Provided training and instruction to the patient for proper LE, core and proximal hip recruitment and positioning and eccentric body weight control with ambulation re-education including up and down stairs     Home Exercise Program:    [x] (91516) Reviewed/Progressed HEP activities related to strengthening, flexibility, endurance, ROM of core, proximal hip and LE for functional self-care, mobility, lifting and ambulation/stair navigation   [] (41857)Reviewed/Progressed HEP activities related to improving balance, coordination, kinesthetic sense, posture, motor skill, proprioception of core, proximal hip and LE for self care, mobility, lifting, and ambulation/stair navigation      Manual Treatments:  PROM / STM / Oscillations-Mobs:  G-I, II, III, IV (PA's, Inf.,

## 2018-04-27 ENCOUNTER — OFFICE VISIT (OUTPATIENT)
Dept: ORTHOPEDIC SURGERY | Age: 68
End: 2018-04-27

## 2018-04-27 DIAGNOSIS — S82.892A CLOSED LEFT ANKLE FRACTURE, INITIAL ENCOUNTER: Primary | ICD-10-CM

## 2018-04-27 DIAGNOSIS — M25.572 LEFT ANKLE PAIN, UNSPECIFIED CHRONICITY: ICD-10-CM

## 2018-04-27 PROCEDURE — 99024 POSTOP FOLLOW-UP VISIT: CPT | Performed by: PHYSICIAN ASSISTANT

## 2018-04-30 ENCOUNTER — HOSPITAL ENCOUNTER (OUTPATIENT)
Dept: PHYSICAL THERAPY | Age: 68
Discharge: HOME | End: 2018-05-01

## 2018-05-01 ENCOUNTER — HOSPITAL ENCOUNTER (OUTPATIENT)
Dept: PHYSICAL THERAPY | Age: 68
Discharge: HOME | End: 2018-05-31
Attending: PHYSICIAN ASSISTANT | Admitting: PHYSICIAN ASSISTANT

## 2018-05-02 ENCOUNTER — HOSPITAL ENCOUNTER (OUTPATIENT)
Dept: PHYSICAL THERAPY | Age: 68
Discharge: HOME | End: 2018-05-03

## 2018-05-02 NOTE — FLOWSHEET NOTE
[facility]  Orthopaedics and Sports Rehabilitation, [place] [address] [address], [place] [place], [address]         Phone: [phone]   Fax:     [phone]      Physical Therapy Daily Treatment Note  Date:  2018    Patient Name:  Fernando Hoff    :    MRN: [id number]  Restrictions/Precautions:    Medical/Treatment Diagnosis Information:  Diagnosis: S82.852D closed displaced trimalleolar fracture of left ankle with routine healing, subsequent encounter; S82.892A closed left ankle fracture, initial encounter  · Treatment Diagnosis: L ankle pain  Insurance/Certification information:  PT Insurance Information: Medicare  Physician Information:  Referring Practitioner: NOEL Abbott  Plan of care signed (Y/N):     Date of Patient follow up with Physician:     G-Code (if applicable):      Date G-Code Applied:    PT G-Codes  Functional Assessment Tool Used: LEFS  Score: 61% disability  Functional Limitation: Mobility: Walking and moving around  Mobility: Walking and Moving Around Current Status (): At least 60 percent but less than 80 percent impaired, limited or restricted  Mobility: Walking and Moving Around Goal Status (): At least 1 percent but less than 20 percent impaired, limited or restricted    Progress Note: [x]  Yes  []  No  Next due by: Visit #10       Latex Allergy:  [x]NO      []YES  Preferred Language for Healthcare:   [x]English       []other:    Visit # Insurance Allowable   4 Medicare     Pain level:  0/10     SUBJECTIVE:  Pt. Reports her ankle feels good since her last PT visit.      OBJECTIVE:   Observation:   Test measurements:      RESTRICTIONS/PRECAUTIONS: s/p ORIF trimalleolar fx 18    Exercises/Interventions: Rx 18,  MD 18    intervention reps sets notes   Belt pull gastroc, soleus stretch 3x30\" each     Seated plantarflexion self stretch 3x30\" NV    Seated toe curls/cupping x30 NV    Seated heel/toe raises x30 NV    BAPS 4 way     X posture, motor skill, proprioception of core, proximal hip and LE for self care, mobility, lifting, and ambulation/stair navigation      Manual Treatments:  PROM / STM / Oscillations-Mobs:  G-I, II, III, IV (PA's, Inf., Post.)  [x] (18092) Provided manual therapy to mobilize LE, proximal hip and/or LS spine soft tissue/joints for the purpose of modulating pain, promoting relaxation,  increasing ROM, reducing/eliminating soft tissue swelling/inflammation/restriction, improving soft tissue extensibility and allowing for proper ROM for normal function with self care, mobility, lifting and ambulation. Modalities:  Cold pack 15'     Charges:  Timed Code Treatment Minutes: 30   Total Treatment Minutes: 45   Pt. Was 10 minutes late   [] EVAL  [x] GN(61268) x  1   [] IONTO  [] NMR (32050) x      [] VASO  [x] Manual (17788) x  1    [] Other:  [] TA x       [] Mech Traction (03278)  [] ES(attended) (10871)      [] ES (un) (16459):     PLAN  Frequency/Duration:  2 days per week for 8 Weeks:  Interventions:  - Therapeutic exercise including: strength training, ROM/flexibility, NMR and proprioception for the proximal hip, trunk and Lower extremity  - Manual therapy as indicated including Dry Needling/IASTM, STM, PROM, Gr I-IV mobilizations, spinal mobilization/manipulation.   - Modalities as needed including: thermal agents, E-stim, US, iontophoresis as indicated. - Patient education on joint protection, activity modification, progression of HEP. HEP instruction: (see scanned forms)    GOALS:  Patient stated goal: walking, steps    Therapist goals for Patient:   Short Term Goals: To be achieved in: 2 weeks  - Independent in HEP and progression per patient tolerance, in order to prevent re-injury. - Patient will have a decrease in pain to facilitate improvement in movement, function, and ADLs as indicated by Functional Deficits. Long Term Goals:  To be achieved in: 8 weeks  - The patient is expected to demonstrate

## 2018-05-07 ENCOUNTER — HOSPITAL ENCOUNTER (OUTPATIENT)
Dept: PHYSICAL THERAPY | Age: 68
Discharge: HOME | End: 2018-05-08

## 2018-05-07 NOTE — FLOWSHEET NOTE
demonstrate less than 20% impairment, limitation or restriction in:  - walking and moving around (mobility)  - Patient will demonstrate increased passive and active ROM to full, symmetrical and painfree to allow for proper joint functioning as indicated by patients Functional Deficits. - Patient will demonstrate an increase in Strength to full and painfree to resistance testing to allow for proper functional mobility as indicated by patients Functional Deficits. - Patient will return to desired, higher level,  functional activities without increased symptoms or restriction. Progression Towards Functional goals:  [] Patient is progressing as expected towards functional goals listed. [] Progression is slowed due to complexities listed. [] Progression has been slowed due to co-morbidities. [x] Plan just implemented, too soon to assess goals progression  [] Other:     ASSESSMENT:  ROM is progressing well. Treatment/Activity Tolerance:  [x] Patient tolerated treatment well [] Patient limited by fatique  [] Patient limited by pain  [] Patient limited by other medical complications  [] Other:     Prognosis: [x] Good [] Fair  [] Poor    Patient Requires Follow-up: [x] Yes  [] No    PLAN: See eval  [x] Continue per plan of care [] Alter current plan (see comments)  [] Plan of care initiated [] Hold pending MD visit [] Discharge    Plan for Next Session:  Progress ankle conditioning and intrinsic foot muscle control activities as tolerated with a functional progression toward more upright and functional positions.     Electronically signed by: Dyan Abad, [address], [address]

## 2018-05-09 ENCOUNTER — OFFICE VISIT (OUTPATIENT)
Dept: ORTHOPEDIC SURGERY | Age: 68
End: 2018-05-09

## 2018-05-09 DIAGNOSIS — M79.672 LEFT FOOT PAIN: ICD-10-CM

## 2018-05-09 DIAGNOSIS — M25.572 LEFT ANKLE PAIN, UNSPECIFIED CHRONICITY: ICD-10-CM

## 2018-05-09 DIAGNOSIS — S82.892A CLOSED LEFT ANKLE FRACTURE, INITIAL ENCOUNTER: Primary | ICD-10-CM

## 2018-05-09 PROCEDURE — G8399 PT W/DXA RESULTS DOCUMENT: HCPCS | Performed by: PHYSICIAN ASSISTANT

## 2018-05-09 PROCEDURE — G8417 CALC BMI ABV UP PARAM F/U: HCPCS | Performed by: PHYSICIAN ASSISTANT

## 2018-05-09 PROCEDURE — 4040F PNEUMOC VAC/ADMIN/RCVD: CPT | Performed by: PHYSICIAN ASSISTANT

## 2018-05-09 PROCEDURE — 1090F PRES/ABSN URINE INCON ASSESS: CPT | Performed by: PHYSICIAN ASSISTANT

## 2018-05-09 PROCEDURE — 1123F ACP DISCUSS/DSCN MKR DOCD: CPT | Performed by: PHYSICIAN ASSISTANT

## 2018-05-09 PROCEDURE — G8428 CUR MEDS NOT DOCUMENT: HCPCS | Performed by: PHYSICIAN ASSISTANT

## 2018-05-09 PROCEDURE — 99213 OFFICE O/P EST LOW 20 MIN: CPT | Performed by: PHYSICIAN ASSISTANT

## 2018-05-09 PROCEDURE — 3017F COLORECTAL CA SCREEN DOC REV: CPT | Performed by: PHYSICIAN ASSISTANT

## 2018-05-09 PROCEDURE — 1036F TOBACCO NON-USER: CPT | Performed by: PHYSICIAN ASSISTANT

## 2018-05-17 ENCOUNTER — HOSPITAL ENCOUNTER (OUTPATIENT)
Dept: MAMMOGRAPHY | Age: 68
Discharge: HOME | End: 2018-05-17
Attending: FAMILY MEDICINE | Admitting: FAMILY MEDICINE

## 2018-05-17 ENCOUNTER — OFFICE VISIT (OUTPATIENT)
Dept: FAMILY MEDICINE CLINIC | Age: 68
End: 2018-05-17

## 2018-05-17 VITALS
TEMPERATURE: 98.1 F | DIASTOLIC BLOOD PRESSURE: 70 MMHG | SYSTOLIC BLOOD PRESSURE: 126 MMHG | WEIGHT: 196.6 LBS | BODY MASS INDEX: 34.83 KG/M2

## 2018-05-17 DIAGNOSIS — E78.2 MIXED HYPERLIPIDEMIA: ICD-10-CM

## 2018-05-17 DIAGNOSIS — E53.8 VITAMIN B 12 DEFICIENCY: ICD-10-CM

## 2018-05-17 DIAGNOSIS — Z12.31 SCREENING MAMMOGRAM, ENCOUNTER FOR: ICD-10-CM

## 2018-05-17 DIAGNOSIS — R73.03 PRE-DIABETES: ICD-10-CM

## 2018-05-17 DIAGNOSIS — R60.0 EDEMA, LOWER EXTREMITY: ICD-10-CM

## 2018-05-17 DIAGNOSIS — I10 ESSENTIAL HYPERTENSION: ICD-10-CM

## 2018-05-17 DIAGNOSIS — M77.42 METATARSALGIA OF LEFT FOOT: Primary | ICD-10-CM

## 2018-05-17 PROCEDURE — 1090F PRES/ABSN URINE INCON ASSESS: CPT | Performed by: FAMILY MEDICINE

## 2018-05-17 PROCEDURE — 1123F ACP DISCUSS/DSCN MKR DOCD: CPT | Performed by: FAMILY MEDICINE

## 2018-05-17 PROCEDURE — 3017F COLORECTAL CA SCREEN DOC REV: CPT | Performed by: FAMILY MEDICINE

## 2018-05-17 PROCEDURE — G8427 DOCREV CUR MEDS BY ELIG CLIN: HCPCS | Performed by: FAMILY MEDICINE

## 2018-05-17 PROCEDURE — G8399 PT W/DXA RESULTS DOCUMENT: HCPCS | Performed by: FAMILY MEDICINE

## 2018-05-17 PROCEDURE — 99214 OFFICE O/P EST MOD 30 MIN: CPT | Performed by: FAMILY MEDICINE

## 2018-05-17 PROCEDURE — 4040F PNEUMOC VAC/ADMIN/RCVD: CPT | Performed by: FAMILY MEDICINE

## 2018-05-17 PROCEDURE — G8417 CALC BMI ABV UP PARAM F/U: HCPCS | Performed by: FAMILY MEDICINE

## 2018-05-17 PROCEDURE — 1036F TOBACCO NON-USER: CPT | Performed by: FAMILY MEDICINE

## 2018-05-17 ASSESSMENT — ENCOUNTER SYMPTOMS
GASTROINTESTINAL NEGATIVE: 1
EYES NEGATIVE: 1
RESPIRATORY NEGATIVE: 1

## 2018-05-18 DIAGNOSIS — E78.2 MIXED HYPERLIPIDEMIA: ICD-10-CM

## 2018-05-18 DIAGNOSIS — E53.8 VITAMIN B 12 DEFICIENCY: ICD-10-CM

## 2018-05-18 DIAGNOSIS — R73.03 PRE-DIABETES: ICD-10-CM

## 2018-05-18 DIAGNOSIS — I10 ESSENTIAL HYPERTENSION: ICD-10-CM

## 2018-05-18 LAB
A/G RATIO: 1.7 (ref 1.1–2.2)
ALBUMIN SERPL-MCNC: 4.3 G/DL (ref 3.4–5)
ALP BLD-CCNC: 95 U/L (ref 40–129)
ALT SERPL-CCNC: 24 U/L (ref 10–40)
ANION GAP SERPL CALCULATED.3IONS-SCNC: 12 MMOL/L (ref 3–16)
AST SERPL-CCNC: 19 U/L (ref 15–37)
BILIRUB SERPL-MCNC: 0.3 MG/DL (ref 0–1)
BUN BLDV-MCNC: 13 MG/DL (ref 7–20)
CALCIUM SERPL-MCNC: 9.8 MG/DL (ref 8.3–10.6)
CHLORIDE BLD-SCNC: 102 MMOL/L (ref 99–110)
CHOLESTEROL, TOTAL: 178 MG/DL (ref 0–199)
CO2: 30 MMOL/L (ref 21–32)
CREAT SERPL-MCNC: 0.7 MG/DL (ref 0.6–1.2)
GFR AFRICAN AMERICAN: >60
GFR NON-AFRICAN AMERICAN: >60
GLOBULIN: 2.5 G/DL
GLUCOSE BLD-MCNC: 120 MG/DL (ref 70–99)
HDLC SERPL-MCNC: 54 MG/DL (ref 40–60)
LDL CHOLESTEROL CALCULATED: 100 MG/DL
POTASSIUM SERPL-SCNC: 4.8 MMOL/L (ref 3.5–5.1)
SODIUM BLD-SCNC: 144 MMOL/L (ref 136–145)
T4 FREE: 1.1 NG/DL (ref 0.9–1.8)
TOTAL PROTEIN: 6.8 G/DL (ref 6.4–8.2)
TRIGL SERPL-MCNC: 121 MG/DL (ref 0–150)
TSH SERPL DL<=0.05 MIU/L-ACNC: 1.93 UIU/ML (ref 0.27–4.2)
VITAMIN B-12: 309 PG/ML (ref 211–911)
VLDLC SERPL CALC-MCNC: 24 MG/DL

## 2018-05-19 LAB
ESTIMATED AVERAGE GLUCOSE: 125.5 MG/DL
HBA1C MFR BLD: 6 %

## 2018-06-01 ENCOUNTER — HOSPITAL ENCOUNTER (OUTPATIENT)
Dept: PHYSICAL THERAPY | Age: 68
Discharge: HOME | End: 2018-06-30
Attending: PHYSICIAN ASSISTANT | Admitting: PHYSICIAN ASSISTANT

## 2018-06-01 RX ORDER — CYCLOBENZAPRINE HCL 10 MG
TABLET ORAL
Qty: 60 TABLET | Refills: 0 | Status: SHIPPED | OUTPATIENT
Start: 2018-06-01 | End: 2019-02-22

## 2018-07-23 ENCOUNTER — TELEPHONE (OUTPATIENT)
Dept: ORTHOPEDIC SURGERY | Age: 68
End: 2018-07-23

## 2018-07-23 NOTE — TELEPHONE ENCOUNTER
1/21/18 LT TRIMALL ORIF W/ SYNDESMOSIS REPAIR     Patient called saying she is still having a lot of pain when something touches her ankle. (Like the covers when she is in bed at night)  Wanted to know if this was normal to still be feeling like this?

## 2018-07-27 ENCOUNTER — OFFICE VISIT (OUTPATIENT)
Dept: ORTHOPEDIC SURGERY | Age: 68
End: 2018-07-27

## 2018-07-27 DIAGNOSIS — S82.892A CLOSED LEFT ANKLE FRACTURE, INITIAL ENCOUNTER: Primary | ICD-10-CM

## 2018-07-27 DIAGNOSIS — M25.572 LEFT ANKLE PAIN, UNSPECIFIED CHRONICITY: ICD-10-CM

## 2018-07-27 PROCEDURE — 1101F PT FALLS ASSESS-DOCD LE1/YR: CPT | Performed by: PHYSICIAN ASSISTANT

## 2018-07-27 PROCEDURE — 1036F TOBACCO NON-USER: CPT | Performed by: PHYSICIAN ASSISTANT

## 2018-07-27 PROCEDURE — 99213 OFFICE O/P EST LOW 20 MIN: CPT | Performed by: PHYSICIAN ASSISTANT

## 2018-07-27 PROCEDURE — G8399 PT W/DXA RESULTS DOCUMENT: HCPCS | Performed by: PHYSICIAN ASSISTANT

## 2018-07-27 PROCEDURE — 3017F COLORECTAL CA SCREEN DOC REV: CPT | Performed by: PHYSICIAN ASSISTANT

## 2018-07-27 PROCEDURE — 1090F PRES/ABSN URINE INCON ASSESS: CPT | Performed by: PHYSICIAN ASSISTANT

## 2018-07-27 PROCEDURE — G8417 CALC BMI ABV UP PARAM F/U: HCPCS | Performed by: PHYSICIAN ASSISTANT

## 2018-07-27 PROCEDURE — 4040F PNEUMOC VAC/ADMIN/RCVD: CPT | Performed by: PHYSICIAN ASSISTANT

## 2018-07-27 PROCEDURE — 1123F ACP DISCUSS/DSCN MKR DOCD: CPT | Performed by: PHYSICIAN ASSISTANT

## 2018-07-27 PROCEDURE — G8427 DOCREV CUR MEDS BY ELIG CLIN: HCPCS | Performed by: PHYSICIAN ASSISTANT

## 2018-07-27 NOTE — PROGRESS NOTES
Chief Complaint    Ankle Pain (lt ankle has blister type wound on inner ankle; 6 months s/p orif with syn. repair)      History of Present Illness:  Ana Freitas is a 78 y.o. female who presents to the office today for follow-up visit. On 1/21/2018 patient underwent ORIF left ankle with Dr. Liza Macario. She is doing well in terms of pain and function. She does state that she chronically has a scab medial ankle. She treated with triple antibiotic ointment and a Band-Aid. It heals and the scab falls off. She states that it does scab falls off and there is healthy skin underneath it. She discontinues the Band-Aid and the scab returns. She denies any drainage from the region.       Pain Assessment  Location of Pain: Ankle  Location Modifiers: Left  Severity of Pain: 0  Limiting Behavior: Some  Result of Injury: Yes  Work-Related Injury: No  Are there other pain locations you wish to document?: No]    Medical History:  Past Medical History:   Diagnosis Date    Anxiety     Herpes     opthalmic    Hypertension     Insomnia     Radial head fracture, closed, left, closed, initial encounter 1/21/2018     Patient Active Problem List    Diagnosis Date Noted    Pre-diabetes 05/17/2018    Mild single current episode of major depressive disorder ([facility] [address] 02/07/2018    Closed left ankle fracture, initial encounter 01/22/2018    Displaced trimalleolar fracture 01/21/2018    Dislocation of talus, initial encounter 01/21/2018    Radial head fracture, closed, left, closed, initial encounter 01/21/2018    Essential hypertension 04/10/2017    Mixed hyperlipidemia 04/10/2017    Vitamin B 12 deficiency 04/10/2017    Burning mouth syndrome 50/31/5150    Oral lichen planus 31/73/3566    Atopic dermatitis 08/26/2011    Vitamin D deficiency 08/26/2011    Depression 07/27/2010    Encounter for routine gynecological examination 07/27/2010    Routine general medical examination at a health care facility 07/27/2010    Menopausal 325 MG tablet Take 325 mg by mouth daily      amLODIPine (NORVASC) 10 MG tablet Take 1 tablet by mouth daily 90 tablet 1    omeprazole (PRILOSEC) 20 MG delayed release capsule Take 1 capsule by mouth Daily 90 capsule 1    nebivolol (BYSTOLIC) 5 MG tablet Take 1 tablet by mouth daily 90 tablet 1    escitalopram (LEXAPRO) 20 MG tablet Take 1 tablet by mouth daily 90 tablet 1    silver sulfADIAZINE (SILVADENE) 1 % cream Apply topically daily. 100 g 2    Cholecalciferol (VITAMIN D) 2000 UNITS CAPS capsule Take 1 capsule by mouth Daily.  Calcium Carbonate-Vitamin D (CALTRATE 600+D) 600-400 MG-UNIT TABS Take 1 tablet by mouth 2 times daily.  multivitamin (THERAGRAN) per tablet Take 1 tablet by mouth daily. No current facility-administered medications for this visit. Review of Systems:  Relevant review of systems reviewed and available in the patient's chart      Vital Signs: There were no vitals taken for this visit. General Exam:   Constitutional: Patient is adequately groomed with no evidence of malnutrition  DTRs: Deep tendon reflexes are intact  Mental Status: The patient is oriented to time, place and person. The patient's mood and affect are appropriate. Lymphatic: The lymphatic examination bilaterally reveals all areas to be without enlargement or induration. Vascular: Examination reveals no swelling or calf tenderness. Peripheral pulses are palpable and 2+. Neurological: The patient has good coordination. There is no weakness or sensory deficit. Left Ankle Examination:    Inspection:  No Swelling and ecchymosis surrounding the medial or lateral ankle. There is a small scab over the medial aspect of the ankle with no sign of infection.     Palpation:  No focal tenderness medial or lateral malleoli    Range of Motion:  Near full range of motion without pain    Strength:  5 over 5 strength with dorsiflexion, plantarflexion, inversion, eversion    Special Tests:  Negative anterior drawer. Negative talar tilt. Skin: There are no rashes, ulcerations or lesions. Gait: Normal    Reflex 2+ and symmetric    Additional Comments:       Additional Examinations:         Right Lower Extremity: Examination of the right lower extremity does not show any tenderness, deformity or injury. Range of motion is unremarkable. There is no gross instability. There are no rashes, ulcerations or lesions. Strength and tone are normal.     Radiology:     X-rays obtained and reviewed in office:  Views 3 views including AP, lateral, and oblique  Location left ankle  Impression : Patient does have evidence of healed fracture medial and lateral malleoli with intact medial clear space. Hardware in place without evidence of loosening. Impression:  Encounter Diagnosis   Name Primary?  Left ankle pain, unspecified chronicity Yes       Office Procedures:  Orders Placed This Encounter   Procedures    XR ANKLE LEFT (MIN 3 VIEWS)       Treatment Plan:  Patient's area where the scab is relatively corresponds to where the fracture site was. She had some skin breakdown in the region from pressure from the medial malleolus fracture. I believe if she just protects the area of longer the skin will heal, mature, and should be fine. Have recommended she follow up with us in 6 weeks to check her progress.

## 2018-08-03 DIAGNOSIS — I10 ESSENTIAL HYPERTENSION: ICD-10-CM

## 2018-08-03 DIAGNOSIS — K21.9 GASTROESOPHAGEAL REFLUX DISEASE, ESOPHAGITIS PRESENCE NOT SPECIFIED: ICD-10-CM

## 2018-08-07 RX ORDER — TRIAMTERENE AND HYDROCHLOROTHIAZIDE 75; 50 MG/1; MG/1
TABLET ORAL
Qty: 30 TABLET | Refills: 0 | Status: SHIPPED | OUTPATIENT
Start: 2018-08-07 | End: 2018-12-04 | Stop reason: SDUPTHER

## 2018-08-07 RX ORDER — AMLODIPINE BESYLATE 10 MG/1
10 TABLET ORAL DAILY
Qty: 30 TABLET | Refills: 0 | Status: SHIPPED | OUTPATIENT
Start: 2018-08-07 | End: 2018-12-04 | Stop reason: SDUPTHER

## 2018-08-07 RX ORDER — NEBIVOLOL HYDROCHLORIDE 5 MG/1
5 TABLET ORAL DAILY
Qty: 30 TABLET | Refills: 0 | Status: SHIPPED | OUTPATIENT
Start: 2018-08-07 | End: 2018-12-04 | Stop reason: SDUPTHER

## 2018-08-07 RX ORDER — OMEPRAZOLE 20 MG/1
20 CAPSULE, DELAYED RELEASE ORAL DAILY
Qty: 30 CAPSULE | Refills: 0 | Status: SHIPPED | OUTPATIENT
Start: 2018-08-07 | End: 2018-12-04 | Stop reason: SDUPTHER

## 2018-08-17 ENCOUNTER — HOSPITAL ENCOUNTER (EMERGENCY)
Age: 68
Discharge: HOME | End: 2018-08-17
Attending: EMERGENCY MEDICINE
Payer: MEDICARE

## 2018-08-17 VITALS
OXYGEN SATURATION: 95 % | DIASTOLIC BLOOD PRESSURE: 62 MMHG | RESPIRATION RATE: 20 BRPM | TEMPERATURE: 98 F | HEART RATE: 82 BPM | SYSTOLIC BLOOD PRESSURE: 114 MMHG

## 2018-08-17 DIAGNOSIS — M79.604 RIGHT LEG PAIN: Primary | ICD-10-CM

## 2018-08-17 PROCEDURE — 99283 EMERGENCY DEPT VISIT LOW MDM: CPT

## 2018-08-17 PROCEDURE — 6370000000 HC RX 637 (ALT 250 FOR IP): Performed by: EMERGENCY MEDICINE

## 2018-08-17 PROCEDURE — 93971 EXTREMITY STUDY: CPT

## 2018-08-17 RX ORDER — METHOCARBAMOL 500 MG/1
500 TABLET, FILM COATED ORAL 4 TIMES DAILY
Qty: 12 TABLET | Refills: 0 | Status: SHIPPED | OUTPATIENT
Start: 2018-08-17 | End: 2018-08-20

## 2018-08-17 RX ORDER — GABAPENTIN 300 MG/1
300 CAPSULE ORAL ONCE
Status: COMPLETED | OUTPATIENT
Start: 2018-08-17 | End: 2018-08-17

## 2018-08-17 RX ADMIN — GABAPENTIN 300 MG: 300 CAPSULE ORAL at 09:28

## 2018-08-17 ASSESSMENT — PAIN DESCRIPTION - LOCATION: LOCATION: LEG

## 2018-08-17 ASSESSMENT — PAIN SCALES - GENERAL: PAINLEVEL_OUTOF10: 8

## 2018-08-17 ASSESSMENT — PAIN DESCRIPTION - FREQUENCY: FREQUENCY: INTERMITTENT

## 2018-08-17 ASSESSMENT — PAIN DESCRIPTION - DESCRIPTORS: DESCRIPTORS: ACHING

## 2018-08-17 ASSESSMENT — PAIN DESCRIPTION - ORIENTATION: ORIENTATION: RIGHT;LOWER;MID

## 2018-08-17 NOTE — ED NOTES
Pt resting in bed no needs at this time. Vascular completed the study. No other needs at this time will continue to monitor.       Sulaiman Darden RN  08/17/18 [facility]  08/17/18 [id number]

## 2018-08-17 NOTE — ED NOTES
Pt is alert and oriented times four. Pt states she woke up around midnight and states that it was hard for her to walk on her right leg. Pt states she has pain to the right leg from the back of her calf to her upper thigh. Pt states he pain is a 8  out of 10 at this time. Pt given medication per order. Pt denies injury. No swelling or redness noted to right leg. Call light in reach. Pt and family updated on POC. Will continue to monitor.       Ranjan Boggs RN  08/17/18 7380

## 2018-08-17 NOTE — ED PROVIDER NOTES
motion. She exhibits tenderness. She exhibits no edema or deformity. Tenderness to palpation in posterior right thigh and calf. No edema to right leg. No notable deformity. able to flex right hip without difficulty. able to flex and extend knee and dorsiflex and plantarflex right ankle without difficulty. Neurological: She is alert and oriented to person, place, and time. +SILT in RLE   Skin: Skin is warm and dry. No erythema, rash, lesion noted to right anterior or posterior leg   Vitals reviewed. Diagnostic Results       RADIOLOGY:  VL Extremity Venous Right   Final Result          LABS:   No results found for this visit on 08/17/18. RECENT VITALS:  BP: 114/62, Temp: 98 °F (36.7 °C), Pulse: 82, Resp: 20, SpO2: 95 %     Procedures     none    ED Course     Nursing Notes, Past Medical Hx, Past Surgical Hx, Social Hx, Allergies, and Family Hx were reviewed. The patient was given the following medications:  Orders Placed This Encounter   Medications    gabapentin (NEURONTIN) capsule 300 mg    methocarbamol (ROBAXIN) 500 MG tablet     Sig: Take 1 tablet by mouth 4 times daily for 3 days     Dispense:  12 tablet     Refill:  0       CONSULTS:  None    MEDICAL DECISION MAKING / ASSESSMENT / Israel Delong is a 80 y.o. female who presents with pain in her right leg specifically the posterior aspect of her right thigh and calf. Patient noted abrupt onset of pain when she stood up to use the restroom earlier in the evening. She denied any trauma or falls. Patient was NV intact in her right lower extremity. She denies any recent travel, denied history of DVT or PE. On examination, there is no edema or notable deformity to the right leg. No erythema or warmth to touch. Patient had full range of motion in the right hip, knee, and ankle. Duplex ultrasound was obtained to rule out DVT, this was negative. Patient was advised is likely a muscle strain, will be discharged with muscle relaxants.  RICE instructions were verbalized to the patient. Clinical Impression     1.  Right leg pain        Disposition     PATIENT REFERRED TO:  [facility] [place]  [address]  [phone]    Schedule an appointment as soon as possible for a visit in 1 week        DISCHARGE MEDICATIONS:  Discharge Medication List as of 8/17/2018 11:51 AM      START taking these medications    Details   methocarbamol (ROBAXIN) 500 MG tablet Take 1 tablet by mouth 4 times daily for 3 days, Disp-12 tablet, R-0Print             DISPOSITION Decision To Discharge 08/17/2018 11:26:34 AM       Daxa Stephenson MD  08/19/18 9912

## 2018-08-17 NOTE — PROGRESS NOTES
RLE Venous US Prelim    No evidence of deep or superficial venous thrombus right lower extremity and contralateral femoral vein. No evidence of Baker's cyst RLE.

## 2018-08-19 ASSESSMENT — ENCOUNTER SYMPTOMS
SHORTNESS OF BREATH: 0
CHEST TIGHTNESS: 0

## 2018-12-04 ENCOUNTER — OFFICE VISIT (OUTPATIENT)
Dept: FAMILY MEDICINE CLINIC | Age: 68
End: 2018-12-04
Payer: MEDICARE

## 2018-12-04 VITALS
TEMPERATURE: 97.8 F | OXYGEN SATURATION: 93 % | SYSTOLIC BLOOD PRESSURE: 119 MMHG | HEART RATE: 74 BPM | DIASTOLIC BLOOD PRESSURE: 63 MMHG | BODY MASS INDEX: 32.29 KG/M2 | RESPIRATION RATE: 20 BRPM | HEIGHT: 65 IN | WEIGHT: 193.8 LBS

## 2018-12-04 DIAGNOSIS — R73.03 PREDIABETES: Primary | ICD-10-CM

## 2018-12-04 DIAGNOSIS — K21.9 GASTROESOPHAGEAL REFLUX DISEASE, ESOPHAGITIS PRESENCE NOT SPECIFIED: ICD-10-CM

## 2018-12-04 DIAGNOSIS — I10 ESSENTIAL HYPERTENSION: ICD-10-CM

## 2018-12-04 DIAGNOSIS — Z23 NEED FOR VACCINATION WITH 13-POLYVALENT PNEUMOCOCCAL CONJUGATE VACCINE: ICD-10-CM

## 2018-12-04 PROCEDURE — G8427 DOCREV CUR MEDS BY ELIG CLIN: HCPCS | Performed by: FAMILY MEDICINE

## 2018-12-04 PROCEDURE — 3017F COLORECTAL CA SCREEN DOC REV: CPT | Performed by: FAMILY MEDICINE

## 2018-12-04 PROCEDURE — 1101F PT FALLS ASSESS-DOCD LE1/YR: CPT | Performed by: FAMILY MEDICINE

## 2018-12-04 PROCEDURE — G0009 ADMIN PNEUMOCOCCAL VACCINE: HCPCS | Performed by: FAMILY MEDICINE

## 2018-12-04 PROCEDURE — 1090F PRES/ABSN URINE INCON ASSESS: CPT | Performed by: FAMILY MEDICINE

## 2018-12-04 PROCEDURE — 4040F PNEUMOC VAC/ADMIN/RCVD: CPT | Performed by: FAMILY MEDICINE

## 2018-12-04 PROCEDURE — G8484 FLU IMMUNIZE NO ADMIN: HCPCS | Performed by: FAMILY MEDICINE

## 2018-12-04 PROCEDURE — 1123F ACP DISCUSS/DSCN MKR DOCD: CPT | Performed by: FAMILY MEDICINE

## 2018-12-04 PROCEDURE — 99214 OFFICE O/P EST MOD 30 MIN: CPT | Performed by: FAMILY MEDICINE

## 2018-12-04 PROCEDURE — 90670 PCV13 VACCINE IM: CPT | Performed by: FAMILY MEDICINE

## 2018-12-04 PROCEDURE — 1036F TOBACCO NON-USER: CPT | Performed by: FAMILY MEDICINE

## 2018-12-04 PROCEDURE — G8399 PT W/DXA RESULTS DOCUMENT: HCPCS | Performed by: FAMILY MEDICINE

## 2018-12-04 PROCEDURE — G8417 CALC BMI ABV UP PARAM F/U: HCPCS | Performed by: FAMILY MEDICINE

## 2018-12-04 RX ORDER — TRIAMTERENE AND HYDROCHLOROTHIAZIDE 75; 50 MG/1; MG/1
TABLET ORAL
Qty: 90 TABLET | Refills: 3 | Status: SHIPPED | OUTPATIENT
Start: 2018-12-04 | End: 2019-01-22 | Stop reason: SDUPTHER

## 2018-12-04 RX ORDER — NEBIVOLOL 5 MG/1
5 TABLET ORAL DAILY
Qty: 90 TABLET | Refills: 3 | Status: SHIPPED | OUTPATIENT
Start: 2018-12-04 | End: 2019-01-22 | Stop reason: SDUPTHER

## 2018-12-04 RX ORDER — OMEPRAZOLE 20 MG/1
20 CAPSULE, DELAYED RELEASE ORAL DAILY
Qty: 90 CAPSULE | Refills: 3 | Status: SHIPPED | OUTPATIENT
Start: 2018-12-04 | End: 2019-01-22 | Stop reason: SDUPTHER

## 2018-12-04 RX ORDER — AMLODIPINE BESYLATE 10 MG/1
10 TABLET ORAL DAILY
Qty: 90 TABLET | Refills: 3 | Status: SHIPPED | OUTPATIENT
Start: 2018-12-04 | End: 2019-01-22 | Stop reason: SDUPTHER
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
## 2018-12-04 NOTE — PATIENT INSTRUCTIONS
1) You are due for the colonoscopy. Plan to get this done in next 3 months  Dr. Venus Urbina of one of his colleagues. Christianeien Cass [facility])  [address] [facility], [address] [place] [address]  Phone: [phone]  2) You received part 1 of the pneumococal vaccine. Complete the other injection 1 year from now. 3) Let your local [facility] know if you need renewal of Magic Mouth Wash (Lidocaine). 4) Get your fasting labwork done in April or May. F/u in about 1 week after appointment.

## 2019-01-21 ENCOUNTER — TELEPHONE (OUTPATIENT)
Dept: FAMILY MEDICINE CLINIC | Age: 69
End: 2019-01-21

## 2019-01-22 DIAGNOSIS — I10 ESSENTIAL HYPERTENSION: ICD-10-CM

## 2019-01-22 DIAGNOSIS — K21.9 GASTROESOPHAGEAL REFLUX DISEASE, ESOPHAGITIS PRESENCE NOT SPECIFIED: ICD-10-CM

## 2019-01-23 RX ORDER — NEBIVOLOL 5 MG/1
5 TABLET ORAL DAILY
Qty: 90 TABLET | Refills: 3 | Status: SHIPPED | OUTPATIENT
Start: 2019-01-23 | End: 2019-08-02 | Stop reason: SDUPTHER

## 2019-01-23 RX ORDER — TRIAMTERENE AND HYDROCHLOROTHIAZIDE 75; 50 MG/1; MG/1
TABLET ORAL
Qty: 90 TABLET | Refills: 3 | Status: SHIPPED | OUTPATIENT
Start: 2019-01-23 | End: 2019-08-26 | Stop reason: SDUPTHER

## 2019-01-23 RX ORDER — AMLODIPINE BESYLATE 10 MG/1
10 TABLET ORAL DAILY
Qty: 90 TABLET | Refills: 3 | Status: SHIPPED | OUTPATIENT
Start: 2019-01-23 | End: 2019-08-26 | Stop reason: SDUPTHER

## 2019-01-23 RX ORDER — OMEPRAZOLE 20 MG/1
20 CAPSULE, DELAYED RELEASE ORAL DAILY
Qty: 90 CAPSULE | Refills: 3 | Status: SHIPPED | OUTPATIENT
Start: 2019-01-23 | End: 2019-08-26 | Stop reason: SDUPTHER

## 2019-02-22 ENCOUNTER — OFFICE VISIT (OUTPATIENT)
Dept: FAMILY MEDICINE CLINIC | Age: 69
End: 2019-02-22
Payer: MEDICARE

## 2019-02-22 VITALS
HEIGHT: 64 IN | BODY MASS INDEX: 34.15 KG/M2 | WEIGHT: 200 LBS | TEMPERATURE: 98 F | DIASTOLIC BLOOD PRESSURE: 65 MMHG | OXYGEN SATURATION: 93 % | HEART RATE: 61 BPM | RESPIRATION RATE: 10 BRPM | SYSTOLIC BLOOD PRESSURE: 108 MMHG

## 2019-02-22 DIAGNOSIS — H60.541 DERMATITIS OF EAR CANAL, RIGHT: ICD-10-CM

## 2019-02-22 DIAGNOSIS — I10 ESSENTIAL HYPERTENSION: Primary | ICD-10-CM

## 2019-02-22 DIAGNOSIS — Z91.81 AT HIGH RISK FOR FALLS: ICD-10-CM

## 2019-02-22 DIAGNOSIS — G89.4 CHRONIC PAIN SYNDROME: ICD-10-CM

## 2019-02-22 DIAGNOSIS — B00.1 COLD SORE: ICD-10-CM

## 2019-02-22 PROCEDURE — 3017F COLORECTAL CA SCREEN DOC REV: CPT | Performed by: FAMILY MEDICINE

## 2019-02-22 PROCEDURE — 1090F PRES/ABSN URINE INCON ASSESS: CPT | Performed by: FAMILY MEDICINE

## 2019-02-22 PROCEDURE — G8427 DOCREV CUR MEDS BY ELIG CLIN: HCPCS | Performed by: FAMILY MEDICINE

## 2019-02-22 PROCEDURE — G8399 PT W/DXA RESULTS DOCUMENT: HCPCS | Performed by: FAMILY MEDICINE

## 2019-02-22 PROCEDURE — 1036F TOBACCO NON-USER: CPT | Performed by: FAMILY MEDICINE

## 2019-02-22 PROCEDURE — 1101F PT FALLS ASSESS-DOCD LE1/YR: CPT | Performed by: FAMILY MEDICINE

## 2019-02-22 PROCEDURE — 4040F PNEUMOC VAC/ADMIN/RCVD: CPT | Performed by: FAMILY MEDICINE

## 2019-02-22 PROCEDURE — 1123F ACP DISCUSS/DSCN MKR DOCD: CPT | Performed by: FAMILY MEDICINE

## 2019-02-22 PROCEDURE — G8484 FLU IMMUNIZE NO ADMIN: HCPCS | Performed by: FAMILY MEDICINE

## 2019-02-22 PROCEDURE — 99214 OFFICE O/P EST MOD 30 MIN: CPT | Performed by: FAMILY MEDICINE

## 2019-02-22 PROCEDURE — 4130F TOPICAL PREP RX AOE: CPT | Performed by: FAMILY MEDICINE

## 2019-02-22 PROCEDURE — G8417 CALC BMI ABV UP PARAM F/U: HCPCS | Performed by: FAMILY MEDICINE

## 2019-02-22 RX ORDER — VALACYCLOVIR HYDROCHLORIDE 1 G/1
TABLET, FILM COATED ORAL
Qty: 36 TABLET | Refills: 3 | Status: SHIPPED | OUTPATIENT
Start: 2019-02-22 | End: 2019-08-26 | Stop reason: SDUPTHER

## 2019-02-22 RX ORDER — CHLORAL HYDRATE 500 MG
1000 CAPSULE ORAL DAILY
COMMUNITY
End: 2021-02-18

## 2019-02-22 RX ORDER — METOPROLOL SUCCINATE 25 MG/1
25 TABLET, EXTENDED RELEASE ORAL DAILY
Qty: 90 TABLET | Refills: 3 | Status: SHIPPED | OUTPATIENT
Start: 2019-02-22 | End: 2020-03-02 | Stop reason: ALTCHOICE

## 2019-03-24 ENCOUNTER — PATIENT MESSAGE (OUTPATIENT)
Dept: FAMILY MEDICINE CLINIC | Age: 69
End: 2019-03-24

## 2019-05-06 ENCOUNTER — OFFICE VISIT (OUTPATIENT)
Dept: FAMILY MEDICINE CLINIC | Age: 69
End: 2019-05-06
Payer: MEDICARE

## 2019-05-06 VITALS
BODY MASS INDEX: 33.88 KG/M2 | HEART RATE: 83 BPM | WEIGHT: 197.4 LBS | SYSTOLIC BLOOD PRESSURE: 128 MMHG | DIASTOLIC BLOOD PRESSURE: 80 MMHG | TEMPERATURE: 98.4 F | OXYGEN SATURATION: 92 % | RESPIRATION RATE: 16 BRPM

## 2019-05-06 DIAGNOSIS — F43.22 ADJUSTMENT DISORDER WITH ANXIETY: Primary | ICD-10-CM

## 2019-05-06 PROCEDURE — 1036F TOBACCO NON-USER: CPT | Performed by: FAMILY MEDICINE

## 2019-05-06 PROCEDURE — G8417 CALC BMI ABV UP PARAM F/U: HCPCS | Performed by: FAMILY MEDICINE

## 2019-05-06 PROCEDURE — 1090F PRES/ABSN URINE INCON ASSESS: CPT | Performed by: FAMILY MEDICINE

## 2019-05-06 PROCEDURE — G8399 PT W/DXA RESULTS DOCUMENT: HCPCS | Performed by: FAMILY MEDICINE

## 2019-05-06 PROCEDURE — 4040F PNEUMOC VAC/ADMIN/RCVD: CPT | Performed by: FAMILY MEDICINE

## 2019-05-06 PROCEDURE — G8427 DOCREV CUR MEDS BY ELIG CLIN: HCPCS | Performed by: FAMILY MEDICINE

## 2019-05-06 PROCEDURE — 1123F ACP DISCUSS/DSCN MKR DOCD: CPT | Performed by: FAMILY MEDICINE

## 2019-05-06 PROCEDURE — 99214 OFFICE O/P EST MOD 30 MIN: CPT | Performed by: FAMILY MEDICINE

## 2019-05-06 PROCEDURE — 3017F COLORECTAL CA SCREEN DOC REV: CPT | Performed by: FAMILY MEDICINE

## 2019-05-06 RX ORDER — LORAZEPAM 0.5 MG/1
0.5 TABLET ORAL NIGHTLY PRN
Qty: 30 TABLET | Refills: 2 | Status: SHIPPED | OUTPATIENT
Start: 2019-05-06 | End: 2019-09-18 | Stop reason: SDUPTHER
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
## 2019-06-29 ENCOUNTER — PATIENT MESSAGE (OUTPATIENT)
Dept: FAMILY MEDICINE CLINIC | Age: 69
End: 2019-06-29

## 2019-08-02 ENCOUNTER — PATIENT MESSAGE (OUTPATIENT)
Dept: FAMILY MEDICINE CLINIC | Age: 69
End: 2019-08-02

## 2019-08-02 DIAGNOSIS — I10 ESSENTIAL HYPERTENSION: ICD-10-CM

## 2019-08-02 RX ORDER — NEBIVOLOL 5 MG/1
5 TABLET ORAL DAILY
Qty: 30 TABLET | Refills: 0 | Status: SHIPPED | OUTPATIENT
Start: 2019-08-02 | End: 2019-08-26 | Stop reason: SDUPTHER

## 2019-08-12 ENCOUNTER — OFFICE VISIT (OUTPATIENT)
Dept: ORTHOPEDIC SURGERY | Age: 69
End: 2019-08-12
Payer: MEDICARE

## 2019-08-12 DIAGNOSIS — Z98.890 HISTORY OF OPEN REDUCTION AND INTERNAL FIXATION (ORIF) PROCEDURE: ICD-10-CM

## 2019-08-12 DIAGNOSIS — M25.572 LEFT ANKLE PAIN, UNSPECIFIED CHRONICITY: Primary | ICD-10-CM

## 2019-08-12 PROCEDURE — 1036F TOBACCO NON-USER: CPT | Performed by: ORTHOPAEDIC SURGERY

## 2019-08-12 PROCEDURE — G8428 CUR MEDS NOT DOCUMENT: HCPCS | Performed by: ORTHOPAEDIC SURGERY

## 2019-08-12 PROCEDURE — 99213 OFFICE O/P EST LOW 20 MIN: CPT | Performed by: ORTHOPAEDIC SURGERY

## 2019-08-12 PROCEDURE — 3017F COLORECTAL CA SCREEN DOC REV: CPT | Performed by: ORTHOPAEDIC SURGERY

## 2019-08-12 PROCEDURE — 1090F PRES/ABSN URINE INCON ASSESS: CPT | Performed by: ORTHOPAEDIC SURGERY

## 2019-08-12 PROCEDURE — 4040F PNEUMOC VAC/ADMIN/RCVD: CPT | Performed by: ORTHOPAEDIC SURGERY

## 2019-08-12 PROCEDURE — G8399 PT W/DXA RESULTS DOCUMENT: HCPCS | Performed by: ORTHOPAEDIC SURGERY

## 2019-08-12 PROCEDURE — G8417 CALC BMI ABV UP PARAM F/U: HCPCS | Performed by: ORTHOPAEDIC SURGERY

## 2019-08-12 PROCEDURE — 1123F ACP DISCUSS/DSCN MKR DOCD: CPT | Performed by: ORTHOPAEDIC SURGERY

## 2019-08-26 ENCOUNTER — OFFICE VISIT (OUTPATIENT)
Dept: FAMILY MEDICINE CLINIC | Age: 69
End: 2019-08-26
Payer: MEDICARE

## 2019-08-26 VITALS
BODY MASS INDEX: 33.21 KG/M2 | DIASTOLIC BLOOD PRESSURE: 66 MMHG | SYSTOLIC BLOOD PRESSURE: 120 MMHG | WEIGHT: 193.5 LBS | RESPIRATION RATE: 14 BRPM | OXYGEN SATURATION: 96 % | TEMPERATURE: 98.4 F | HEART RATE: 72 BPM

## 2019-08-26 DIAGNOSIS — B00.1 COLD SORE: ICD-10-CM

## 2019-08-26 DIAGNOSIS — K21.9 GASTROESOPHAGEAL REFLUX DISEASE, ESOPHAGITIS PRESENCE NOT SPECIFIED: ICD-10-CM

## 2019-08-26 DIAGNOSIS — I10 ESSENTIAL HYPERTENSION: ICD-10-CM

## 2019-08-26 PROCEDURE — G8399 PT W/DXA RESULTS DOCUMENT: HCPCS | Performed by: FAMILY MEDICINE

## 2019-08-26 PROCEDURE — 3288F FALL RISK ASSESSMENT DOCD: CPT | Performed by: FAMILY MEDICINE

## 2019-08-26 PROCEDURE — 3017F COLORECTAL CA SCREEN DOC REV: CPT | Performed by: FAMILY MEDICINE

## 2019-08-26 PROCEDURE — 1036F TOBACCO NON-USER: CPT | Performed by: FAMILY MEDICINE

## 2019-08-26 PROCEDURE — G8417 CALC BMI ABV UP PARAM F/U: HCPCS | Performed by: FAMILY MEDICINE

## 2019-08-26 PROCEDURE — 1090F PRES/ABSN URINE INCON ASSESS: CPT | Performed by: FAMILY MEDICINE

## 2019-08-26 PROCEDURE — G8427 DOCREV CUR MEDS BY ELIG CLIN: HCPCS | Performed by: FAMILY MEDICINE

## 2019-08-26 PROCEDURE — 4040F PNEUMOC VAC/ADMIN/RCVD: CPT | Performed by: FAMILY MEDICINE

## 2019-08-26 PROCEDURE — 99214 OFFICE O/P EST MOD 30 MIN: CPT | Performed by: FAMILY MEDICINE

## 2019-08-26 PROCEDURE — 1123F ACP DISCUSS/DSCN MKR DOCD: CPT | Performed by: FAMILY MEDICINE

## 2019-08-26 RX ORDER — NEBIVOLOL 5 MG/1
5 TABLET ORAL DAILY
Qty: 90 TABLET | Refills: 3 | Status: SHIPPED | OUTPATIENT
Start: 2019-08-26 | End: 2020-07-02

## 2019-08-26 RX ORDER — VALACYCLOVIR HYDROCHLORIDE 1 G/1
TABLET, FILM COATED ORAL
Qty: 36 TABLET | Refills: 3 | Status: SHIPPED | OUTPATIENT
Start: 2019-08-26 | End: 2020-07-09

## 2019-08-26 RX ORDER — AMLODIPINE BESYLATE 10 MG/1
10 TABLET ORAL DAILY
Qty: 90 TABLET | Refills: 3 | Status: SHIPPED | OUTPATIENT
Start: 2019-08-26 | End: 2020-06-25

## 2019-08-26 RX ORDER — TRIAMTERENE AND HYDROCHLOROTHIAZIDE 75; 50 MG/1; MG/1
TABLET ORAL
Qty: 90 TABLET | Refills: 3 | Status: SHIPPED | OUTPATIENT
Start: 2019-08-26 | End: 2020-06-25

## 2019-08-26 RX ORDER — OMEPRAZOLE 20 MG/1
20 CAPSULE, DELAYED RELEASE ORAL DAILY
Qty: 90 CAPSULE | Refills: 3 | Status: SHIPPED | OUTPATIENT
Start: 2019-08-26 | End: 2020-06-25

## 2019-08-26 NOTE — PROGRESS NOTES
Instructions on file for this visit. Please note a portion of this chart was generated using dragon dictation software. Although every effort was made to ensure the accuracy of this automated transcription,some errors in transcription may have occurred.

## 2019-09-18 DIAGNOSIS — F43.22 ADJUSTMENT DISORDER WITH ANXIETY: ICD-10-CM

## 2019-09-19 RX ORDER — LORAZEPAM 0.5 MG/1
TABLET ORAL
Qty: 30 TABLET | Refills: 1 | Status: SHIPPED | OUTPATIENT
Start: 2019-09-19 | End: 2019-11-24 | Stop reason: SDUPTHER

## 2019-11-24 DIAGNOSIS — F43.22 ADJUSTMENT DISORDER WITH ANXIETY: ICD-10-CM

## 2019-11-26 RX ORDER — LORAZEPAM 0.5 MG/1
TABLET ORAL
Qty: 30 TABLET | Refills: 0 | Status: SHIPPED | OUTPATIENT
Start: 2019-11-26 | End: 2020-01-07

## 2020-01-08 RX ORDER — LORAZEPAM 0.5 MG/1
TABLET ORAL
Qty: 30 TABLET | Refills: 0 | Status: SHIPPED | OUTPATIENT
Start: 2020-01-08 | End: 2020-03-02 | Stop reason: SDUPTHER

## 2020-01-08 NOTE — TELEPHONE ENCOUNTER
PDMP not functioning. Patient takes med sparingly. Due for another appt if she needs additional tabs. Will approve 30 tabs for now. Ask her if she thinks 30 tabs is enough to last until next appt scheduled March 2020; if not, move up sooner. Either way, see if she is willing to see me for AWV for her next 2020 visit.

## 2020-01-08 NOTE — TELEPHONE ENCOUNTER
Patient advised about scheduling an AWV and patient said that 30 tablets should be enough to last her till her March appointment

## 2020-03-02 ENCOUNTER — OFFICE VISIT (OUTPATIENT)
Dept: FAMILY MEDICINE CLINIC | Age: 70
End: 2020-03-02
Payer: MEDICARE

## 2020-03-02 VITALS
SYSTOLIC BLOOD PRESSURE: 114 MMHG | DIASTOLIC BLOOD PRESSURE: 67 MMHG | WEIGHT: 193.5 LBS | RESPIRATION RATE: 16 BRPM | HEART RATE: 76 BPM | OXYGEN SATURATION: 94 % | BODY MASS INDEX: 33.21 KG/M2 | TEMPERATURE: 99.5 F

## 2020-03-02 PROCEDURE — 1123F ACP DISCUSS/DSCN MKR DOCD: CPT | Performed by: FAMILY MEDICINE

## 2020-03-02 PROCEDURE — 1036F TOBACCO NON-USER: CPT | Performed by: FAMILY MEDICINE

## 2020-03-02 PROCEDURE — 1090F PRES/ABSN URINE INCON ASSESS: CPT | Performed by: FAMILY MEDICINE

## 2020-03-02 PROCEDURE — G8417 CALC BMI ABV UP PARAM F/U: HCPCS | Performed by: FAMILY MEDICINE

## 2020-03-02 PROCEDURE — G8427 DOCREV CUR MEDS BY ELIG CLIN: HCPCS | Performed by: FAMILY MEDICINE

## 2020-03-02 PROCEDURE — 4040F PNEUMOC VAC/ADMIN/RCVD: CPT | Performed by: FAMILY MEDICINE

## 2020-03-02 PROCEDURE — 3017F COLORECTAL CA SCREEN DOC REV: CPT | Performed by: FAMILY MEDICINE

## 2020-03-02 PROCEDURE — G8484 FLU IMMUNIZE NO ADMIN: HCPCS | Performed by: FAMILY MEDICINE

## 2020-03-02 PROCEDURE — 99213 OFFICE O/P EST LOW 20 MIN: CPT | Performed by: FAMILY MEDICINE

## 2020-03-02 PROCEDURE — G8399 PT W/DXA RESULTS DOCUMENT: HCPCS | Performed by: FAMILY MEDICINE

## 2020-03-02 RX ORDER — LORAZEPAM 0.5 MG/1
TABLET ORAL
Qty: 45 TABLET | Refills: 2 | Status: SHIPPED | OUTPATIENT
Start: 2020-03-02 | End: 2020-07-02
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
## 2020-06-25 RX ORDER — OMEPRAZOLE 20 MG/1
CAPSULE, DELAYED RELEASE ORAL
Qty: 90 CAPSULE | Refills: 3 | Status: SHIPPED | OUTPATIENT
Start: 2020-06-25 | End: 2021-08-30

## 2020-06-25 RX ORDER — TRIAMTERENE AND HYDROCHLOROTHIAZIDE 75; 50 MG/1; MG/1
TABLET ORAL
Qty: 90 TABLET | Refills: 3 | Status: SHIPPED | OUTPATIENT
Start: 2020-06-25 | End: 2021-08-30

## 2020-06-25 RX ORDER — AMLODIPINE BESYLATE 10 MG/1
TABLET ORAL
Qty: 90 TABLET | Refills: 3 | Status: SHIPPED | OUTPATIENT
Start: 2020-06-25 | End: 2021-08-30

## 2020-07-04 RX ORDER — NEBIVOLOL HYDROCHLORIDE 5 MG/1
TABLET ORAL
Qty: 90 TABLET | Refills: 0 | Status: SHIPPED | OUTPATIENT
Start: 2020-07-04 | End: 2020-09-10

## 2020-07-04 RX ORDER — LORAZEPAM 0.5 MG/1
TABLET ORAL
Qty: 45 TABLET | Refills: 0 | Status: SHIPPED | OUTPATIENT
Start: 2020-07-04 | End: 2020-08-12

## 2020-07-04 NOTE — TELEPHONE ENCOUNTER
Is almost outisde prescribing window. Due to long-3 day [employer] weekend, I will make exception and e-scribe 45 tabs. Would need telehalth or in person for future refills. Let her know.

## 2020-07-09 RX ORDER — VALACYCLOVIR HYDROCHLORIDE 1 G/1
TABLET, FILM COATED ORAL
Qty: 36 TABLET | Refills: 3 | Status: SHIPPED | OUTPATIENT
Start: 2020-07-09 | End: 2021-08-30

## 2020-08-24 ENCOUNTER — VIRTUAL VISIT (OUTPATIENT)
Dept: FAMILY MEDICINE CLINIC | Age: 70
End: 2020-08-24
Payer: MEDICARE

## 2020-08-24 PROCEDURE — 99442 PR PHYS/QHP TELEPHONE EVALUATION 11-20 MIN: CPT | Performed by: FAMILY MEDICINE

## 2020-08-24 RX ORDER — LORAZEPAM 0.5 MG/1
TABLET ORAL
Qty: 45 TABLET | Refills: 2 | Status: SHIPPED | OUTPATIENT
Start: 2020-08-24 | End: 2020-09-23

## 2020-08-24 NOTE — PROGRESS NOTES
TAKE ONE TO TWO TABLET BY MOUTH ONCE NIGHTLY AS NEEDED FOR ANXIETY. FOLLOW UP APPOINTMENT IS NEEDED. Yes Jeremie Cantu Bertha, DO   LORazepam (ATIVAN) 0.5 MG tablet TAKE 1 OR 2 TABLETS ONCE NIGHTLY AS NEEDED FOR ANXIETY. FOLLOW UP APPOINTMENT IS NEEDED. Yes Jermeie Cantu Bertha, DO   valACYclovir (VALTREX) 1 g tablet TAKE 2 TABLETS BY MOUTH AT  ONSET OF COLD SORE, REPEAT  12 HOURS LATER Yes Clinton Marquise Stone, DO   BYSTOLIC 5 MG tablet TAKE 1 TABLET EVERY DAY Yes Jeremie Cantu Bertha, DO   triamterene-hydrochlorothiazide (MAXZIDE) 75-50 MG per tablet TAKE 1 TABLET EVERY DAY Yes Jeremie Cantu Bertha, DO   omeprazole (PRILOSEC) 20 MG delayed release capsule TAKE 1 CAPSULE EVERY DAY Yes Jeremie Cantu Bertha, DO   amLODIPine (NORVASC) 10 MG tablet TAKE 1 TABLET EVERY DAY Yes Jeremie Cantu Bertha, DO   Multiple Vitamins-Minerals (HAIR SKIN AND NAILS FORMULA) TABS Take 1 tablet by mouth daily Yes Historical Provider, MD   aspirin 325 MG tablet Take 325 mg by mouth daily Yes Historical Provider, MD   Misc Natural Products (TURMERIC CURCUMIN) CAPS Take by mouth  Historical Provider, MD   Omega-3 Fatty Acids (FISH OIL) 1000 MG CAPS Take 1,000 mg by mouth daily  Historical Provider, MD   Calcium Carbonate-Vitamin D (CALTRATE 600+D) 600-400 MG-UNIT TABS Take 1 tablet by mouth 2 times daily. Historical Provider, MD   multivitamin [facility]) per tablet Take 1 tablet by mouth daily. Historical Provider, MD          Patient-Reported Vitals 8/24/2020   Patient-Reported Weight 195lb   Patient-Reported Height 5 ft 5 in      There were no vitals filed for this visit.    Wt Readings from Last 3 Encounters:   03/02/20 193 lb 8 oz (87.8 kg)   08/26/19 193 lb 8 oz (87.8 kg)   05/06/19 197 lb 6.4 oz (89.5 kg)     BP Readings from Last 3 Encounters:   03/02/20 114/67   08/26/19 120/66   05/06/19 128/80       Patient Active Problem List   Diagnosis    Depression    Menopausal syndrome    Generalized osteoarthrosis, (ATIVAN) 0.5 MG tablet   2. Mild single current episode of major depressive disorder ([facility] [address]         #1: The current medical regimen is effective;  continue present plan and medications. #2: stable. PLAN          If applicable, see additional patient information and instructions under \"Patient Instructions. \"    No follow-ups on file. There are no Patient Instructions on file for this visit. TOTAL TIME (in minutes) SPENT ON CONFERENCING:  15    Please note a portion of this chart was generated using dragon dictation software. Although every effort was made to ensure the accuracy of this automated transcription, some errors in transcription may have occurred. Pursuant to the emergency declaration under the [facility], [facility]5 waiver authority and the [employer] Act, this Virtual  Visit was conducted, with patient's consent, to reduce the patient's risk of exposure to COVID-19 and provide continuity of care for an established patient. Services were provided through a video synchronous discussion virtually to substitute for in-person clinic visit. Patient was instructed that the AVS is available on My Chart or was emailed to the patient if not on My Chart. Lab orders were emailed to patient if they do not use a [facility] lab. Any work notes were sent to patient through My Chart or email.

## 2020-09-15 RX ORDER — NEBIVOLOL HYDROCHLORIDE 5 MG/1
TABLET ORAL
Qty: 90 TABLET | Refills: 1 | Status: SHIPPED | OUTPATIENT
Start: 2020-09-15 | End: 2021-08-30

## 2020-11-10 ENCOUNTER — OFFICE VISIT (OUTPATIENT)
Dept: FAMILY MEDICINE CLINIC | Age: 70
End: 2020-11-10
Payer: MEDICARE

## 2020-11-10 VITALS
HEART RATE: 84 BPM | BODY MASS INDEX: 34.45 KG/M2 | TEMPERATURE: 98.1 F | SYSTOLIC BLOOD PRESSURE: 108 MMHG | DIASTOLIC BLOOD PRESSURE: 80 MMHG | WEIGHT: 201.8 LBS | RESPIRATION RATE: 22 BRPM | OXYGEN SATURATION: 95 % | HEIGHT: 64 IN

## 2020-11-10 PROCEDURE — 99214 OFFICE O/P EST MOD 30 MIN: CPT | Performed by: FAMILY MEDICINE

## 2020-11-10 PROCEDURE — 3017F COLORECTAL CA SCREEN DOC REV: CPT | Performed by: FAMILY MEDICINE

## 2020-11-10 PROCEDURE — G8427 DOCREV CUR MEDS BY ELIG CLIN: HCPCS | Performed by: FAMILY MEDICINE

## 2020-11-10 PROCEDURE — G8482 FLU IMMUNIZE ORDER/ADMIN: HCPCS | Performed by: FAMILY MEDICINE

## 2020-11-10 PROCEDURE — G8399 PT W/DXA RESULTS DOCUMENT: HCPCS | Performed by: FAMILY MEDICINE

## 2020-11-10 PROCEDURE — 1090F PRES/ABSN URINE INCON ASSESS: CPT | Performed by: FAMILY MEDICINE

## 2020-11-10 PROCEDURE — G8417 CALC BMI ABV UP PARAM F/U: HCPCS | Performed by: FAMILY MEDICINE

## 2020-11-10 PROCEDURE — 4040F PNEUMOC VAC/ADMIN/RCVD: CPT | Performed by: FAMILY MEDICINE

## 2020-11-10 PROCEDURE — 1123F ACP DISCUSS/DSCN MKR DOCD: CPT | Performed by: FAMILY MEDICINE

## 2020-11-10 PROCEDURE — 1036F TOBACCO NON-USER: CPT | Performed by: FAMILY MEDICINE

## 2020-11-10 RX ORDER — AZITHROMYCIN 250 MG/1
250 TABLET, FILM COATED ORAL DAILY
Qty: 6 TABLET | Refills: 0 | Status: SHIPPED | OUTPATIENT
Start: 2020-11-10 | End: 2021-02-18

## 2020-11-10 RX ORDER — TRAZODONE HYDROCHLORIDE 50 MG/1
50-100 TABLET ORAL NIGHTLY
Qty: 60 TABLET | Refills: 5 | Status: SHIPPED | OUTPATIENT
Start: 2020-11-10 | End: 2021-02-18

## 2020-11-10 NOTE — PROGRESS NOTES
Pt has had some chronic ear issues, and does feel that sx have increased on the L side about 3 days ago. Pt with discomfort and pain going down the side of the face. Pt did have some mild intermittent whitish discharge but using over the counter hydrogen peroxide with help. Pt does tend to get a lot of wax, uses over the counter with help. Pt does not have any issues of hearing loss. Pt is dealing with moderate anxiety and stress. Pt is having issues with  and his memory, he is getting evaluated for dementia. Pt is struggling with the stress but is managing. Pt working at a  and that is helpful. Pt is not interested in counseling/therapy. Sleep is moderately affected. Pt does have hx of lorazepam on a prn basis. Pt here for follow up of blood pressure. Pt states doing great with adherence to therapy and feels well. No issues of chest pain, shortness of breath. No vision changes, headache, swelling in legs. Except as noted above in the history of present illness, the review of systems is  negative for headache, vision changes, chest pain, shortness of breath, abdominal pain, urinary sx, bowel changes. Past medical, surgical, and social history reviewed and updated  Medications and allergies reviewed and updated         O: /80 (Site: Right Upper Arm, Position: Sitting, Cuff Size: Medium Adult)   Pulse 84   Temp 98.1 °F (36.7 °C) (Temporal)   Resp 22   Ht 5' 4\" (1.626 m)   Wt 201 lb 12.8 oz (91.5 kg)   SpO2 95%   BMI 34.64 kg/m²   GEN: No acute distress, cooperative, well nourished, alert. HEENT: PEERLA, EOMI , normocephalic/atraumatic, nares and oropharynx clear. Mucous membranes normal, Tympanic membranes clear right. Mild erythema to L tympanic membrane. Mild tender to palpation inferior to ear on L w/o mass, no lymphadenopathy    Neck: soft, supple, no thyromegaly, mass, no Lymphadenopathy  CV: Regular rate and rhythm, no murmur, rubs, gallops.  No edema.  Resp: Clear to auscultation bilaterally good air entry bilaterally  No crackles, wheeze. Breathing comfortably. ASSESSMENT / PLAN:    1. Left ear pain  C/w mild/early otitis media  tx with zpack x 1  Cont over the counter/symptomatic treatment. Follow up for persistent symptoms in 7 to 10 days or sooner for worsening symptomatology   - azithromycin (ZITHROMAX Z-JACKIE) 250 MG tablet; Take 1 tablet by mouth daily Sig 2 tabs po QD x 1d, then 1 tab po QD x 4d  Dispense: 6 tablet; Refill: 0    2. Stress  Moderate stressors, cont supportive therapy  F/u increased sx severity    3. Primary insomnia  Add trial trazodone 50mg 1-2 tabs qhs prn  Discussed use, benefit, and side effects of prescribed medications. Barriers to medication compliance addressed. All patient questions answered. Pt voiced understanding. 4. Essential hypertension  blood pressure stable @ goal, controlled  - CBC; Future  - Comprehensive Metabolic Panel; Future  - Lipid Panel; Future    5. Screen for colon cancer  Overdue colonoscopy  Did have prior hx of colon polyps  Referral to dr. Jeffry Ellington    6. Encounter for screening mammogram for malignant neoplasm of breast  Due mammo, agreeable to set up    7. Hyperglycemia  - Hemoglobin A1C; Future           Follow-up appointment:   Pending bloodwork results/prn    Discussed use, benefit, and side effects of all prescribed medications. Barriers to medication compliance addressed. All patient questions answered. Pt voiced understanding. When applicable, patient's [facility]. The patient has signed appropriate paperworks/consents.

## 2020-11-16 ENCOUNTER — PATIENT MESSAGE (OUTPATIENT)
Dept: FAMILY MEDICINE CLINIC | Age: 70
End: 2020-11-16

## 2020-11-27 ENCOUNTER — PATIENT MESSAGE (OUTPATIENT)
Dept: FAMILY MEDICINE CLINIC | Age: 70
End: 2020-11-27

## 2020-11-27 NOTE — TELEPHONE ENCOUNTER
From: Kaylen Greene  To: Jonah Lynch DO  Sent: 11/27/2020 10:12 AM EST  Subject: Non-Urgent Medical Question    Regarding   Janis Sal)  [facility] your Thanksgiving was good ! Kush Chapman has been vomiting for 3 days now! Feels awful, I do not want to take him to hospital   Last time that was a mess,   I have been forcing fuilds   Could we please just try some meds first then if not feeling better come see you ? Monday if I feel it is worsting I will take him to hospital!  Thank you !   He is a hand full thank you for your understanding   Kaylen Greene   [phone]

## 2020-12-22 DIAGNOSIS — R73.9 HYPERGLYCEMIA: ICD-10-CM

## 2020-12-22 DIAGNOSIS — I10 ESSENTIAL HYPERTENSION: ICD-10-CM

## 2020-12-22 LAB
A/G RATIO: 1.8 (ref 1.1–2.2)
ALBUMIN SERPL-MCNC: 4.3 G/DL (ref 3.4–5)
ALP BLD-CCNC: 98 U/L (ref 40–129)
ALT SERPL-CCNC: 21 U/L (ref 10–40)
ANION GAP SERPL CALCULATED.3IONS-SCNC: 12 MMOL/L (ref 3–16)
AST SERPL-CCNC: 22 U/L (ref 15–37)
BILIRUB SERPL-MCNC: 0.4 MG/DL (ref 0–1)
BUN BLDV-MCNC: 11 MG/DL (ref 7–20)
CALCIUM SERPL-MCNC: 9.8 MG/DL (ref 8.3–10.6)
CHLORIDE BLD-SCNC: 100 MMOL/L (ref 99–110)
CHOLESTEROL, TOTAL: 179 MG/DL (ref 0–199)
CO2: 31 MMOL/L (ref 21–32)
CREAT SERPL-MCNC: 0.9 MG/DL (ref 0.6–1.2)
GFR AFRICAN AMERICAN: >60
GFR NON-AFRICAN AMERICAN: >60
GLOBULIN: 2.4 G/DL
GLUCOSE BLD-MCNC: 111 MG/DL (ref 70–99)
HCT VFR BLD CALC: 42.5 % (ref 36–48)
HDLC SERPL-MCNC: 44 MG/DL (ref 40–60)
HEMOGLOBIN: 14.2 G/DL (ref 12–16)
LDL CHOLESTEROL CALCULATED: 106 MG/DL
MCH RBC QN AUTO: 25.7 PG (ref 26–34)
MCHC RBC AUTO-ENTMCNC: 33.4 G/DL (ref 31–36)
MCV RBC AUTO: 77.1 FL (ref 80–100)
PDW BLD-RTO: 14.6 % (ref 12.4–15.4)
PLATELET # BLD: 360 K/UL (ref 135–450)
PMV BLD AUTO: 7.6 FL (ref 5–10.5)
POTASSIUM SERPL-SCNC: 3.9 MMOL/L (ref 3.5–5.1)
RBC # BLD: 5.52 M/UL (ref 4–5.2)
SODIUM BLD-SCNC: 143 MMOL/L (ref 136–145)
TOTAL PROTEIN: 6.7 G/DL (ref 6.4–8.2)
TRIGL SERPL-MCNC: 146 MG/DL (ref 0–150)
VLDLC SERPL CALC-MCNC: 29 MG/DL
WBC # BLD: 10.2 K/UL (ref 4–11)

## 2020-12-23 LAB
ESTIMATED AVERAGE GLUCOSE: 119.8 MG/DL
HBA1C MFR BLD: 5.8 %

## 2021-02-18 ENCOUNTER — TELEPHONE (OUTPATIENT)
Dept: FAMILY MEDICINE CLINIC | Age: 71
End: 2021-02-18

## 2021-02-18 ENCOUNTER — OFFICE VISIT (OUTPATIENT)
Dept: FAMILY MEDICINE CLINIC | Age: 71
End: 2021-02-18
Payer: MEDICARE

## 2021-02-18 VITALS
RESPIRATION RATE: 16 BRPM | SYSTOLIC BLOOD PRESSURE: 126 MMHG | HEART RATE: 71 BPM | OXYGEN SATURATION: 94 % | HEIGHT: 65 IN | BODY MASS INDEX: 31.75 KG/M2 | DIASTOLIC BLOOD PRESSURE: 72 MMHG | TEMPERATURE: 98 F | WEIGHT: 190.6 LBS

## 2021-02-18 DIAGNOSIS — F51.01 PRIMARY INSOMNIA: ICD-10-CM

## 2021-02-18 DIAGNOSIS — M67.442 DIGITAL MUCOUS CYST OF FINGER OF LEFT HAND: ICD-10-CM

## 2021-02-18 DIAGNOSIS — Z00.00 ROUTINE GENERAL MEDICAL EXAMINATION AT A HEALTH CARE FACILITY: Primary | ICD-10-CM

## 2021-02-18 DIAGNOSIS — Z12.31 VISIT FOR SCREENING MAMMOGRAM: ICD-10-CM

## 2021-02-18 DIAGNOSIS — R59.1 LYMPHADENOPATHY OF HEAD AND NECK: ICD-10-CM

## 2021-02-18 DIAGNOSIS — Z12.11 SCREENING FOR COLON CANCER: Primary | ICD-10-CM

## 2021-02-18 PROCEDURE — 3017F COLORECTAL CA SCREEN DOC REV: CPT | Performed by: FAMILY MEDICINE

## 2021-02-18 PROCEDURE — 4040F PNEUMOC VAC/ADMIN/RCVD: CPT | Performed by: FAMILY MEDICINE

## 2021-02-18 PROCEDURE — 1123F ACP DISCUSS/DSCN MKR DOCD: CPT | Performed by: FAMILY MEDICINE

## 2021-02-18 PROCEDURE — G8482 FLU IMMUNIZE ORDER/ADMIN: HCPCS | Performed by: FAMILY MEDICINE

## 2021-02-18 PROCEDURE — G0439 PPPS, SUBSEQ VISIT: HCPCS | Performed by: FAMILY MEDICINE

## 2021-02-18 RX ORDER — LORAZEPAM 1 MG/1
1 TABLET ORAL NIGHTLY
Qty: 30 TABLET | Refills: 2 | Status: SHIPPED | OUTPATIENT
Start: 2021-02-18 | End: 2021-05-20

## 2021-02-18 SDOH — ECONOMIC STABILITY: FOOD INSECURITY: WITHIN THE PAST 12 MONTHS, THE FOOD YOU BOUGHT JUST DIDN'T LAST AND YOU DIDN'T HAVE MONEY TO GET MORE.: NEVER TRUE

## 2021-02-18 SDOH — ECONOMIC STABILITY: INCOME INSECURITY: HOW HARD IS IT FOR YOU TO PAY FOR THE VERY BASICS LIKE FOOD, HOUSING, MEDICAL CARE, AND HEATING?: NOT HARD AT ALL

## 2021-02-18 ASSESSMENT — LIFESTYLE VARIABLES: HOW OFTEN DO YOU HAVE A DRINK CONTAINING ALCOHOL: 0

## 2021-02-18 ASSESSMENT — PATIENT HEALTH QUESTIONNAIRE - PHQ9
2. FEELING DOWN, DEPRESSED OR HOPELESS: 0
SUM OF ALL RESPONSES TO PHQ9 QUESTIONS 1 & 2: 0
1. LITTLE INTEREST OR PLEASURE IN DOING THINGS: 0
SUM OF ALL RESPONSES TO PHQ QUESTIONS 1-9: 0

## 2021-02-18 NOTE — TELEPHONE ENCOUNTER
Please contact [facility] digestive. Please obtain final c-scope report and bx report? Difficult for patient to tolerate the prep. Is at home Cologuard option for Jayna Finical?

## 2021-02-18 NOTE — PROGRESS NOTES
Laterality Date    ANKLE SURGERY Left 01/21/2018    LEFT ANKLE OPEN REDUCTION INTERNAL FIXATION    BREAST BIOPSY      ELBOW SURGERY      right     HYSTERECTOMY      HYSTERECTOMY, TOTAL ABDOMINAL      SHOULDER ARTHROSCOPY Left     WISDOM TOOTH EXTRACTION           Family History   Problem Relation Age of Onset    Arthritis Mother     Heart Disease Mother     Heart Disease Father     High Blood Pressure Father     Cancer Father         liver, bladder    Arthritis Father     Heart Disease Sister     High Blood Pressure Brother     Arthritis Maternal Aunt     High Blood Pressure Brother        CareTeam (Including outside providers/suppliers regularly involved in providing care):   Patient Care Team:  Iman Quinonez DO as PCP - General (Family Medicine)  Iman Quinonez DO as PCP - [facility] Empaneled Provider  Humberto Gruber MD as Consulting Physician (Otolaryngology)  Adeel Bates MD as Consulting Physician (Gastroenterology)    Wt Readings from Last 3 Encounters:   02/18/21 190 lb 9.6 oz (86.5 kg)   11/10/20 201 lb 12.8 oz (91.5 kg)   03/02/20 193 lb 8 oz (87.8 kg)     Vitals:    02/18/21 1536   BP: 126/72   Pulse: 71   Resp: 16   Temp: 98 °F (36.7 °C)   TempSrc: Tympanic   SpO2: 94%   Weight: 190 lb 9.6 oz (86.5 kg)   Height: 5' 5\" (1.651 m)     Body mass index is 31.72 kg/m². Based upon direct observation of the patient, evaluation of cognition reveals recent and remote memory intact. General Appearance: alert and oriented to person, place and time, well-developed and well-nourished, in no acute distress  Neck: no thyromegaly and tender cervical adenopathy present-left submandibular larger than right submandibular. Left middle finger with mucous cyst.    Patient's complete Health Risk Assessment and screening values have been reviewed and are found in Flowsheets.  The following problems were reviewed today and where indicated follow up appointments were made and/or referrals ordered. Positive Risk Factor Screenings with Interventions:            General Health and ACP:  General  In general, how would you say your health is?: Good  In the past 7 days, have you experienced any of the following? New or Increased Pain, New or Increased Fatigue, Loneliness, Social Isolation, Stress or Anger?: None of These  Do you get the social and emotional support that you need?: Yes  Do you have a Living Will?: Yes  Advance Directives     Power of  Living Will ACP-Advance Directive ACP-Power of     Not on File Not on File Not on File Not on File      General Health Risk Interventions:  · Stress: patient declines any further evaluation/treatment for this issue, And due to her 's mental health issues and need to separate physically from him due to his recent threat to kill himself.     Health Habits/Nutrition:  Health Habits/Nutrition  Do you exercise for at least 20 minutes 2-3 times per week?: Yes  Have you lost any weight without trying in the past 3 months?: (!) Yes  Do you eat only one meal per day?: No  Have you seen the dentist within the past year?: Yes  Body mass index: (!) 31.71  Health Habits/Nutrition Interventions:  · n/a       Personalized Preventive Plan   Current Health Maintenance Status  Immunization History   Administered Date(s) Administered    COVID-19, Moderna, 100mcg/0.5ml 02/17/2021    Influenza Virus Vaccine 11/18/2014, 09/23/2017, 10/10/2018    Influenza, High Dose (Fluzone 65 yrs and older) 11/30/2016, 11/01/2019, 10/08/2020    Influenza, [place], Recombinant, IM PF (Flublok 18 yrs and older) 10/08/2020    Pneumococcal Conjugate 13-valent ([id number]) 12/04/2018    Pneumococcal Polysaccharide ([id number]) 08/28/2017    Tdap (Boostrix, Adacel) 09/13/2012    Zoster Recombinant (Shingrix) 08/01/2018, 03/07/2019        Health Maintenance   Topic Date Due    Colon cancer screen colonoscopy  12/21/2015   [facility] Annual Wellness Visit (AWV) 05/29/2019    Breast cancer screen  05/17/2020    COVID-19 Vaccine (2 of 2 - Moderna series) 03/17/2021    A1C test (Diabetic or Prediabetic)  12/22/2021    Potassium monitoring  12/22/2021    Creatinine monitoring  12/22/2021    DTaP/Tdap/Td vaccine (2 - Td) 09/13/2022    Lipid screen  12/22/2025    DEXA (modify frequency per FRAX score)  Completed    Flu vaccine  Completed    Shingles Vaccine  Completed    Pneumococcal 65+ years Vaccine  Completed    Hepatitis C screen  Completed    Hepatitis A vaccine  Aged Out    Hepatitis B vaccine  Aged Out    Hib vaccine  Aged Out    Meningococcal (ACWY) vaccine  Aged Out     Recommendations for [employer] Due: see orders and patient instructions/AVS.  . Recommended screening schedule for the next 5-10 years is provided to the patient in written form: see Patient Instructions/AVS.    Cosmo Rico was seen today for medicare awv. Diagnoses and all orders for this visit:    Routine general medical examination at a health care facility    Visit for screening mammogram  -     [facility] DIGITAL SCREEN W OR WO CAD BILATERAL; Future    Digital mucous cyst of finger of left hand  -     Jessica Burnham MD, Hand Surgery (Hand, Wrist, Upper Extremity), [facility]    Lymphadenopathy of head and neck  -      HEAD NECK SOFT TISSUE THYROID    Primary insomnia  -     LORazepam (ATIVAN) 1 MG tablet; Take 1 tablet by mouth nightly for 30 days. Patient Instructions     Personalized Preventive Plan for Miles Christiansen - 2/18/2021  Medicare offers a range of preventive health benefits. Some of the tests and screenings are paid in full while other may be subject to a deductible, co-insurance, and/or copay. Some of these benefits include a comprehensive review of your medical history including lifestyle, illnesses that may run in your family, and various assessments and screenings as appropriate.     After reviewing your medical record and screening and assessments performed today your provider may have ordered immunizations, labs, imaging, and/or referrals for you. A list of these orders (if applicable) as well as your Preventive Care list are included within your After Visit Summary for your review. Other Preventive Recommendations:    · A preventive eye exam performed by an eye specialist is recommended every 1-2 years to screen for glaucoma; cataracts, macular degeneration, and other eye disorders. · A preventive dental visit is recommended every 6 months. · Try to get at least 150 minutes of exercise per week or 10,000 steps per day on a pedometer . · Order or download the FREE \"Exercise & Physical Activity: Your Everyday Guide\" from [employer]. Call 8-695.858.7567 or search [employer] online. · You need 2161-2251 mg of calcium and 7330-8394 IU of vitamin D per day. It is possible to meet your calcium requirement with diet alone, but a vitamin D supplement is usually necessary to meet this goal.  · When exposed to the sun, use a sunscreen that protects against both UVA and UVB radiation with an SPF of 30 or greater. Reapply every 2 to 3 hours or after sweating, drying off with a towel, or swimming. · Always wear a seat belt when traveling in a car. Always wear a helmet when riding a bicycle or motorcycle.

## 2021-02-19 ENCOUNTER — PATIENT MESSAGE (OUTPATIENT)
Dept: FAMILY MEDICINE CLINIC | Age: 71
End: 2021-02-19

## 2021-02-19 DIAGNOSIS — L43.8 ORAL LICHEN PLANUS: Primary | ICD-10-CM

## 2021-02-19 NOTE — TELEPHONE ENCOUNTER
Let patient know that we were not able to get the biopsy report from [address] [place] [address]. At this point I will advise that since it was difficult for Tania Fleming to tolerate the prep that she try the cologuard instead. Please go over Cologuard info with her:    Plan to call the [employer] people and exact sciences at [phone]. Have your health insurance card ready. The exact sciences rep will then contact your health insurance rep on a three-way call to determine if Cologuard is free or inexpensive. If it is free or inexpensive let your primary care doctor know so he can order the test.  If it is expensive then still let him know and a different screening method will be offered.

## 2021-02-19 NOTE — TELEPHONE ENCOUNTER
Cologuard order is now in EMR. Patient still needs to call her insurance to verify what percentage is covered before we fax the order. I did instruct her to call Cologuard/exact sciences then to let us know.

## 2021-02-19 NOTE — TELEPHONE ENCOUNTER
From: Danny Goins  To: Lisa Reyes DO  Sent: 2/19/2021 3:19 PM EST  Subject: Non-Urgent Medical Question    I forgot one question so sorry I took so much of you time ! I mouth ( inside of my lips ) are always on fire and swollen and hurt ! Had a biopsy along time ago! One seem to know what to do so who do I fine a specialist in Oral Lichens Plantus  That what they found on the biopsy ! I their one in this city ? Thank you your great care Dr. Juan Segundo!

## 2021-02-22 NOTE — TELEPHONE ENCOUNTER
In clinic team communicate with our [facility] ENT office to see if any of the ENT doctors help with management of oral lichen planus?

## 2021-02-23 NOTE — TELEPHONE ENCOUNTER
Can clinic team contact Dr. Jeremi Arreola or Dr. Michelle King assistant to see of they manage oral lichen planus?

## 2021-02-25 NOTE — TELEPHONE ENCOUNTER
Phoned the ENT office and any of the doctors there treat oral lichen planus. [phone] is the number for the patient to call.

## 2021-02-26 NOTE — TELEPHONE ENCOUNTER
Referral is in place. She can call to make appointment to see either Dr. Stephane Francis or one of his colleagues.

## 2021-03-23 ENCOUNTER — OFFICE VISIT (OUTPATIENT)
Dept: ENT CLINIC | Age: 71
End: 2021-03-23
Payer: MEDICARE

## 2021-03-23 VITALS
DIASTOLIC BLOOD PRESSURE: 83 MMHG | HEART RATE: 84 BPM | SYSTOLIC BLOOD PRESSURE: 125 MMHG | TEMPERATURE: 98.1 F | BODY MASS INDEX: 34.3 KG/M2 | HEIGHT: 62 IN | WEIGHT: 186.4 LBS

## 2021-03-23 DIAGNOSIS — L43.8 ORAL LICHEN PLANUS: Primary | ICD-10-CM

## 2021-03-23 DIAGNOSIS — L43.9 LICHEN PLANUS: ICD-10-CM

## 2021-03-23 PROCEDURE — G8427 DOCREV CUR MEDS BY ELIG CLIN: HCPCS | Performed by: OTOLARYNGOLOGY

## 2021-03-23 PROCEDURE — 1090F PRES/ABSN URINE INCON ASSESS: CPT | Performed by: OTOLARYNGOLOGY

## 2021-03-23 PROCEDURE — 4040F PNEUMOC VAC/ADMIN/RCVD: CPT | Performed by: OTOLARYNGOLOGY

## 2021-03-23 PROCEDURE — G8399 PT W/DXA RESULTS DOCUMENT: HCPCS | Performed by: OTOLARYNGOLOGY

## 2021-03-23 PROCEDURE — G8417 CALC BMI ABV UP PARAM F/U: HCPCS | Performed by: OTOLARYNGOLOGY

## 2021-03-23 PROCEDURE — 1123F ACP DISCUSS/DSCN MKR DOCD: CPT | Performed by: OTOLARYNGOLOGY

## 2021-03-23 PROCEDURE — G8482 FLU IMMUNIZE ORDER/ADMIN: HCPCS | Performed by: OTOLARYNGOLOGY

## 2021-03-23 PROCEDURE — 1036F TOBACCO NON-USER: CPT | Performed by: OTOLARYNGOLOGY

## 2021-03-23 PROCEDURE — 99203 OFFICE O/P NEW LOW 30 MIN: CPT | Performed by: OTOLARYNGOLOGY

## 2021-03-23 PROCEDURE — 3017F COLORECTAL CA SCREEN DOC REV: CPT | Performed by: OTOLARYNGOLOGY

## 2021-03-23 RX ORDER — TRIAMCINOLONE ACETONIDE 0.25 MG/G
OINTMENT TOPICAL
Qty: 1 TUBE | Refills: 5 | Status: SHIPPED | OUTPATIENT
Start: 2021-03-23 | End: 2021-03-30

## 2021-03-24 PROBLEM — L43.9 LICHEN PLANUS: Status: ACTIVE | Noted: 2021-03-24

## 2021-03-24 ASSESSMENT — ENCOUNTER SYMPTOMS
EYE REDNESS: 0
SINUS PRESSURE: 0
NAUSEA: 0
COUGH: 0
FACIAL SWELLING: 0
DIARRHEA: 0
SHORTNESS OF BREATH: 0
SINUS PAIN: 0
EYE ITCHING: 0
RHINORRHEA: 0
VOICE CHANGE: 0
CHOKING: 0
TROUBLE SWALLOWING: 0
EYE PAIN: 0
SORE THROAT: 0

## 2021-03-24 NOTE — PROGRESS NOTES
Subjective:      Patient ID: Jaxon Lindsey is a 72 y.o. female. HPI  Chief Complaint   Patient presents with    lip problem        History of Present Illness  Head/Neck    Jose Costa is a(n) 72 y.o. female who presents with a 5+ year history of lip burning. Saw dermatologist and biopsy showed lichen planus. Dermatologist moved and needs new physician to follow. No acute changes. Lips just burn. Nonsmoker. No alcohol. No acute changes. Has tried vaseline gauze and lip balm.    Pain: Yes  Location: lips  Onset: As above  Timing: waxing and waning  Quality: burning  Severity: moderate  Frequency: daily  Otalgia: No  Odynophagia: No  Dysphagia: No  Voice Changes: No  Lumps in neck: No  Modifying Factors: Non smoker  Associates Signs and Symptoms: No bleeding    Patient Active Problem List   Diagnosis    Depression    Menopausal syndrome    Generalized osteoarthrosis, involving multiple sites    Anxiety    Insomnia    Colonic polyp    Herpes simplex infection    Atopic dermatitis    Vitamin D deficiency    Oral lichen planus    Burning mouth syndrome    Essential hypertension    Mixed hyperlipidemia    Vitamin B 12 deficiency    Displaced trimalleolar fracture    Dislocation of talus, initial encounter    Radial head fracture, closed, left, closed, initial encounter    Closed left ankle fracture, initial encounter    Mild single current episode of major depressive disorder (HCC)    Pre-diabetes     Past Surgical History:   Procedure Laterality Date    ANKLE SURGERY Left 01/21/2018    LEFT ANKLE OPEN REDUCTION INTERNAL FIXATION    BREAST BIOPSY      ELBOW SURGERY      right     HYSTERECTOMY      HYSTERECTOMY, TOTAL ABDOMINAL      SHOULDER ARTHROSCOPY Left     WISDOM TOOTH EXTRACTION       Family History   Problem Relation Age of Onset    Arthritis Mother     Heart Disease Mother     Heart Disease Father     High Blood Pressure Father     Cancer Father         liver, bladder    Arthritis Father  Heart Disease Sister     High Blood Pressure Brother     Arthritis Maternal Aunt     High Blood Pressure Brother      Social History     Socioeconomic History    Marital status:      Spouse name: Not on file    Number of children: Not on file    Years of education: Not on file    Highest education level: Not on file   Occupational History    Not on file   Social Needs    Financial resource strain: Not hard at all   [place] insecurity     Worry: Never true     Inability: Never true   [employer] needs     Medical: No     Non-medical: No   Tobacco Use    Smoking status: Former Smoker     Packs/day: 0.75     Years: 20.00     Pack years: 15.00     Types: Cigarettes     Quit date: 1991     Years since quittin.    Smokeless tobacco: Never Used   Substance and Sexual Activity    Alcohol use: Yes     Comment: very rare    Drug use: No    Sexual activity: Not on file   Lifestyle    Physical activity     Days per week: Not on file     Minutes per session: Not on file    Stress: Not on file   Relationships    Social connections     Talks on phone: Not on file     Gets together: Not on file     Attends Buddhism service: Not on file     Active member of club or organization: Not on file     Attends meetings of clubs or organizations: Not on file     Relationship status: Not on file    Intimate partner violence     Fear of current or ex partner: Not on file     Emotionally abused: Not on file     Physically abused: Not on file     Forced sexual activity: Not on file   Other Topics Concern    Not on file   Social History Narrative    Not on file       DRUG/FOOD ALLERGIES: Lisinopril    CURRENT MEDICATIONS  Prior to Admission medications    Medication Sig Start Date End Date Taking?  Authorizing Provider   triamcinolone (KENALOG) 0.025 % ointment Apply topically 3 times daily when symptomatic 3/23/21 3/30/21 Yes Bashir Paige MD   Nutritional Supplements (VITAMIN D BOOSTER PO) Take 1 tablet by mouth daily   Yes Historical Provider, MD   BYSTOLIC 5 MG tablet TAKE 1 TABLET EVERY DAY 9/15/20  Yes Clinton Stone DO   valACYclovir (VALTREX) 1 g tablet TAKE 2 TABLETS BY MOUTH AT  ONSET OF COLD SORE, REPEAT  12 HOURS LATER 7/9/20  Yes Apolonio Holstein Bingcang, DO   triamterene-hydrochlorothiazide (MAXZIDE) 75-50 MG per tablet TAKE 1 TABLET EVERY DAY 6/25/20  Yes Apolonio Holstein Bingcang, DO   omeprazole (PRILOSEC) 20 MG delayed release capsule TAKE 1 CAPSULE EVERY DAY 6/25/20  Yes Apolonio Holstein Bingcang, DO   amLODIPine (NORVASC) 10 MG tablet TAKE 1 TABLET EVERY DAY 6/25/20  Yes Apolonio Holstein Bingcang, DO   Misc Natural Products ([facility]) CAPS Take by mouth   Yes Historical Provider, MD   Multiple Vitamins-Minerals (HAIR SKIN AND NAILS FORMULA) TABS Take 1 tablet by mouth daily   Yes Historical Provider, MD   aspirin 325 MG tablet Take 325 mg by mouth daily   Yes Historical Provider, MD         Review of Systems   Constitutional: Negative for activity change, appetite change, chills, fatigue and fever. HENT: Negative for congestion, ear discharge, ear pain, facial swelling, hearing loss, nosebleeds, postnasal drip, rhinorrhea, sinus pressure, sinus pain, sneezing, sore throat, tinnitus, trouble swallowing and voice change. Eyes: Negative for pain, redness, itching and visual disturbance. Respiratory: Negative for cough, choking and shortness of breath. Gastrointestinal: Negative for diarrhea and nausea. Endocrine: Negative for cold intolerance and heat intolerance. Objective:   Physical Exam  Constitutional:       General: She is not in acute distress. Appearance: She is well-developed. HENT:      Head: Not macrocephalic and not microcephalic. No abrasion or contusion. Mouth/Throat:      Lips: No lesions. Mouth: No oral lesions. Dentition: Normal dentition. Tongue: No lesions. Palate: No mass.       Pharynx: No oropharyngeal exudate, posterior oropharyngeal erythema or uvula swelling. Tonsils: No tonsillar abscesses. Neck:      Musculoskeletal: Normal range of motion and neck supple. No edema, erythema or muscular tenderness. Thyroid: No thyroid mass or thyromegaly. Trachea: No tracheal tenderness or tracheal deviation. Cardiovascular:      Rate and Rhythm: Normal rate and regular rhythm. Pulmonary:      Breath sounds: No stridor. Lymphadenopathy:      Head:      Right side of head: No submental, submandibular, tonsillar, preauricular, posterior auricular or occipital adenopathy. Left side of head: No submental, submandibular, tonsillar, preauricular or occipital adenopathy. Cervical: No cervical adenopathy. Right cervical: No superficial, deep or posterior cervical adenopathy. Left cervical: No superficial, deep or posterior cervical adenopathy. Skin:     General: Skin is warm and dry. Findings: No lesion. Neurological:      Mental Status: She is alert and oriented to person, place, and time. Psychiatric:         Mood and Affect: Mood is not anxious or depressed. Assessment:       Diagnosis Orders   1. Oral lichen planus  triamcinolone (KENALOG) 0.025 % ointment   2. Lichen planus             Plan:      Reviewed records from Dr. Noemi Broussard, 2015  Ordered triamcinolone ointment to apply TID till symptom control then decrease as tolerated. Educated on chronicity of disease and to follow up every 6 months and call with any acute changes. Oral prednisone for acute flares. If disease worsens consider dermatology referral for advanced therapies, immune suppressive medications. The patient was counseled for lichen planus, oral and received a triamcinalone prescription medication(s) for this diagnosis. The mechanism of action for the prescription(s) were explained/reviewed. The appropriate usage was described and technique for topical use explained if appropriate.   Questions from the patient were answered and the prescription(s) were e-prescribed to the appropriate pharmacy.             Ayse James MD

## 2021-05-20 DIAGNOSIS — F51.01 PRIMARY INSOMNIA: ICD-10-CM

## 2021-05-20 RX ORDER — LORAZEPAM 1 MG/1
TABLET ORAL
Qty: 30 TABLET | Refills: 0 | Status: SHIPPED | OUTPATIENT
Start: 2021-05-20 | End: 2021-07-08 | Stop reason: SDUPTHER

## 2021-07-08 ENCOUNTER — OFFICE VISIT (OUTPATIENT)
Dept: FAMILY MEDICINE CLINIC | Age: 71
End: 2021-07-08
Payer: MEDICARE

## 2021-07-08 VITALS
WEIGHT: 182.2 LBS | DIASTOLIC BLOOD PRESSURE: 69 MMHG | BODY MASS INDEX: 33.32 KG/M2 | TEMPERATURE: 97.7 F | RESPIRATION RATE: 16 BRPM | OXYGEN SATURATION: 94 % | SYSTOLIC BLOOD PRESSURE: 115 MMHG | HEART RATE: 67 BPM

## 2021-07-08 DIAGNOSIS — F51.01 PRIMARY INSOMNIA: ICD-10-CM

## 2021-07-08 PROCEDURE — 3017F COLORECTAL CA SCREEN DOC REV: CPT | Performed by: FAMILY MEDICINE

## 2021-07-08 PROCEDURE — G8427 DOCREV CUR MEDS BY ELIG CLIN: HCPCS | Performed by: FAMILY MEDICINE

## 2021-07-08 PROCEDURE — G8417 CALC BMI ABV UP PARAM F/U: HCPCS | Performed by: FAMILY MEDICINE

## 2021-07-08 PROCEDURE — 1090F PRES/ABSN URINE INCON ASSESS: CPT | Performed by: FAMILY MEDICINE

## 2021-07-08 PROCEDURE — G8399 PT W/DXA RESULTS DOCUMENT: HCPCS | Performed by: FAMILY MEDICINE

## 2021-07-08 PROCEDURE — 99213 OFFICE O/P EST LOW 20 MIN: CPT | Performed by: FAMILY MEDICINE

## 2021-07-08 PROCEDURE — 4040F PNEUMOC VAC/ADMIN/RCVD: CPT | Performed by: FAMILY MEDICINE

## 2021-07-08 PROCEDURE — 1036F TOBACCO NON-USER: CPT | Performed by: FAMILY MEDICINE

## 2021-07-08 PROCEDURE — 1123F ACP DISCUSS/DSCN MKR DOCD: CPT | Performed by: FAMILY MEDICINE

## 2021-07-08 RX ORDER — LORAZEPAM 1 MG/1
TABLET ORAL
Qty: 60 TABLET | Refills: 2 | Status: SHIPPED | OUTPATIENT
Start: 2021-07-08 | End: 2021-10-05 | Stop reason: SDUPTHER

## 2021-07-08 NOTE — PROGRESS NOTES
[facility]  Progress Note  Alivia Ventura  [birth year]    07/08/21    Chief Complaint:   Jagdeep Howell is a 72 y.o. female who is here for anxiety and insomnia        HPI:    from her . She is cleaning the home and plans to downsize. Her  is living in his own place. With the marital strife Victor M Cain finds that her insomnia has been worse recently and has been experiencing panic attacks. ROS negative for headache, visionchanges, chest pain, shortness of breath, abdominal pain, urinary sx, bowel changes. Past medical, surgical, and social history reviewed. and allergies reviewed. Allergies   Allergen Reactions    Lisinopril Other (See Comments)     cough     Prior to Visit Medications    Medication Sig Taking? Authorizing Provider   LORazepam (ATIVAN) 1 MG tablet Take 1/2 to 1 tablet po BID prn anxiety or insomnia.  Yes Anamaria Stone, DO   Nutritional Supplements (VITAMIN D BOOSTER PO) Take 1 tablet by mouth daily Yes Historical Provider, MD   BYSTOLIC 5 MG tablet TAKE 1 TABLET EVERY DAY Yes Anamaria Stone DO   valACYclovir (VALTREX) 1 g tablet TAKE 2 TABLETS BY MOUTH AT  ONSET OF COLD SORE, REPEAT  12 HOURS LATER Yes Anamaria Stone, DO   triamterene-hydrochlorothiazide (MAXZIDE) 75-50 MG per tablet TAKE 1 TABLET EVERY DAY Yes Anamaria Stone DO   omeprazole (PRILOSEC) 20 MG delayed release capsule TAKE 1 CAPSULE EVERY DAY Yes Anamaria Stone DO   amLODIPine (NORVASC) 10 MG tablet TAKE 1 TABLET EVERY DAY Yes Anamaria Stone DO   Misc Natural Products (TURMERIC CURCUMIN) CAPS Take by mouth Yes Historical Provider, MD   Multiple Vitamins-Minerals (HAIR SKIN AND NAILS FORMULA) TABS Take 1 tablet by mouth daily Yes Historical Provider, MD   aspirin 325 MG tablet Take 325 mg by mouth daily Yes Historical Provider, MD          Vitals:    07/08/21 1142   BP: 115/69   Pulse: 67   Resp: 16   Temp: 97.7 °F (36.5 right     HYSTERECTOMY      HYSTERECTOMY, TOTAL ABDOMINAL      SHOULDER ARTHROSCOPY Left     WISDOM TOOTH EXTRACTION       Family History   Problem Relation Age of Onset    Arthritis Mother     Heart Disease Mother     Heart Disease Father     High Blood Pressure Father     Cancer Father         liver, bladder    Arthritis Father     Heart Disease Sister     High Blood Pressure Brother     Arthritis Maternal Aunt     High Blood Pressure Brother      Social History     Socioeconomic History    Marital status:      Spouse name: Not on file    Number of children: Not on file    Years of education: Not on file    Highest education level: Not on file   Occupational History    Not on file   Tobacco Use    Smoking status: Former Smoker     Packs/day: 0.75     Years: 20.00     Pack years: 15.00     Types: Cigarettes     Quit date: 1991     Years since quittin.3    Smokeless tobacco: Never Used   Vaping Use    Vaping Use: Never used   Substance and Sexual Activity    Alcohol use: Yes     Comment: very rare    Drug use: No    Sexual activity: Not on file   Other Topics Concern    Not on file   Social History Narrative    Not on file     Social Determinants of Health     Financial Resource Strain: Low Risk     Difficulty of Paying Living Expenses: Not hard at all   Food Insecurity: No Food Insecurity    Worried About Running Out of Food in the Last Year: Never true    Mat of Food in the Last Year: Never true   Transportation Needs: No Transportation Needs    Lack of Transportation (Medical): No    Lack of Transportation (Non-Medical):  No   Physical Activity:     Days of Exercise per Week:     Minutes of Exercise per Session:    Stress:     Feeling of Stress :    Social Connections:     Frequency of Communication with Friends and Family:     Frequency of Social Gatherings with Friends and Family:     Attends Catholic Services:     Active Member of Clubs or Organizations:     Attends Club or Organization Meetings:     Marital Status:    Intimate Partner Violence:     Fear of Current or Ex-Partner:     Emotionally Abused:     Physically Abused:     Sexually Abused:        O: /69   Pulse 67   Temp 97.7 °F (36.5 °C) (Tympanic)   Resp 16   Wt 182 lb 3.2 oz (82.6 kg)   SpO2 94%   Breastfeeding No   BMI 33.32 kg/m²   Physical Exam  GEN: No acute distress,cooperative, well nourished, alert. HEENT: PEERLA, EOMI , normocephalic/atraumatic, external nose appears normal.  External ear is normal.    Neck: soft, supple, no appreciable thyromegaly,mass  CV: No upper extremity edema. Resp:  Breathing comfortably. Psych:normal affect. Neuro: AOx3  Other Pertinent Physical Exam findings: n/a        ASSESSMENT   Diagnosis Orders   1. Primary insomnia  LORazepam (ATIVAN) 1 MG tablet       #1:   Worse recently with marital strife. Increase the quantity for now. The risks, benefits, potential side effects and barriers to medication use were addressed today. Understanding was acknowledged. Patient asked to follow-up if condition(s) do not improve as anticipated. She was interested in medical marijuana for the insomnia and anxiety and I had to defer her to the [facility] website for finding a provider who can write a certificate to recommend.     PLAN          Time spent on encounter (including any number of the following: review of labs, imaging, provider notes, outside hospital records; performing examination/evaluation; counseling patient and family; ordering medications/tests; placing referrals and communication with referring physicians; coordination of care, and documentation in the EHR): 26 minutes  Established E/M: 10-19 (29172), 20-29 (25571), 30-39 (25314), 40-54 (63368)   New E/M: 15-29 (33975), 30-44 (86947), 45-59 (61779), 60-74 (46164)  Telephone E/M: 5-10 (Ankita), 11-20 (59623), 21-30 (54103)    If applicable, see additional patient information and instructions under \"Patient Instructions. \"    Return in about 3 months (around 10/8/2021) for Anxiety, Insomnia. There are no Patient Instructions on file for this visit. Please note a portion of this chart was generated using dragon dictation software. Although every effort was made to ensure the accuracy of this automated transcription,some errors in transcription may have occurred.

## 2021-07-12 ENCOUNTER — TELEPHONE (OUTPATIENT)
Dept: FAMILY MEDICINE CLINIC | Age: 71
End: 2021-07-12

## 2021-07-12 NOTE — TELEPHONE ENCOUNTER
Patient would like a lidocaine in a liquid or cream.She wants a script sent to [facility] if lidocaine comes in the form of liquid or cream. She is trying to make her own lip balm. Dr Pamla Opitz or a ma may want to call her bout this medication she is trying to make. Her lips burn all the time. She has a friend who make her own lip balm and this has really helped her.

## 2021-08-29 DIAGNOSIS — I10 ESSENTIAL HYPERTENSION: ICD-10-CM

## 2021-08-29 DIAGNOSIS — K21.9 GASTROESOPHAGEAL REFLUX DISEASE: ICD-10-CM

## 2021-08-29 DIAGNOSIS — B00.1 COLD SORE: ICD-10-CM

## 2021-08-30 RX ORDER — TRIAMTERENE AND HYDROCHLOROTHIAZIDE 75; 50 MG/1; MG/1
TABLET ORAL
Qty: 90 TABLET | Refills: 3 | Status: SHIPPED | OUTPATIENT
Start: 2021-08-30 | End: 2022-03-05 | Stop reason: SDUPTHER

## 2021-08-30 RX ORDER — VALACYCLOVIR HYDROCHLORIDE 1 G/1
TABLET, FILM COATED ORAL
Qty: 36 TABLET | Refills: 3 | Status: SHIPPED | OUTPATIENT
Start: 2021-08-30 | End: 2022-03-05 | Stop reason: SDUPTHER

## 2021-08-30 RX ORDER — NEBIVOLOL HYDROCHLORIDE 5 MG/1
TABLET ORAL
Qty: 90 TABLET | Refills: 1 | Status: SHIPPED | OUTPATIENT
Start: 2021-08-30 | End: 2022-03-05 | Stop reason: SDUPTHER

## 2021-08-30 RX ORDER — AMLODIPINE BESYLATE 10 MG/1
TABLET ORAL
Qty: 90 TABLET | Refills: 3 | Status: SHIPPED | OUTPATIENT
Start: 2021-08-30 | End: 2022-03-04 | Stop reason: SDUPTHER

## 2021-08-30 RX ORDER — OMEPRAZOLE 20 MG/1
CAPSULE, DELAYED RELEASE ORAL
Qty: 90 CAPSULE | Refills: 3 | Status: SHIPPED | OUTPATIENT
Start: 2021-08-30 | End: 2022-03-05 | Stop reason: SDUPTHER

## 2021-08-30 NOTE — TELEPHONE ENCOUNTER
Requested Prescriptions     Pending Prescriptions Disp Refills    amLODIPine (NORVASC) 10 MG tablet [Pharmacy Med Name: AMLODIPINE BESYLATE 10 MG Tablet] 90 tablet 3     Sig: TAKE 1 TABLET EVERY DAY    valACYclovir (VALTREX) 1 g tablet [Pharmacy Med Name: VALACYCLOVIR HCL 1 GM Tablet] 36 tablet 3     Sig: TAKE 2 TABLETS AT  ONSET OF COLD SORE, REPEAT  12 HOURS LATER    omeprazole (PRILOSEC) 20 MG delayed release capsule [Pharmacy Med Name: OMEPRAZOLE 20 MG Capsule Delayed Release] 90 capsule 3     Sig: TAKE 1 CAPSULE EVERY DAY    triamterene-hydroCHLOROthiazide (MAXZIDE) 75-50 MG per tablet [Pharmacy Med Name: TRIAMTERENE/HYDROCHLOROTHIAZIDE 75-50 MG Tablet] 90 tablet 3     Sig: TAKE 1 TABLET EVERY DAY    BYSTOLIC 5 MG tablet [Pharmacy Med Name: BYSTOLIC 5 MG Tablet] 90 tablet 1     Sig: TAKE 1 TABLET EVERY DAY     Last ov 7/8/21  Last lab 12/22/20

## 2021-10-04 ENCOUNTER — TELEPHONE (OUTPATIENT)
Dept: FAMILY MEDICINE CLINIC | Age: 71
End: 2021-10-04

## 2021-10-04 NOTE — TELEPHONE ENCOUNTER
Patient requesting a appointment for tomorrow for her swelling lips. Patient states excruciating pain and need to get in anytime tomorrow. Please advise. Thanks.

## 2021-10-05 ENCOUNTER — OFFICE VISIT (OUTPATIENT)
Dept: FAMILY MEDICINE CLINIC | Age: 71
End: 2021-10-05
Payer: MEDICARE

## 2021-10-05 VITALS
RESPIRATION RATE: 12 BRPM | BODY MASS INDEX: 31.75 KG/M2 | OXYGEN SATURATION: 97 % | SYSTOLIC BLOOD PRESSURE: 130 MMHG | DIASTOLIC BLOOD PRESSURE: 60 MMHG | WEIGHT: 173.6 LBS | TEMPERATURE: 96.9 F | HEART RATE: 76 BPM

## 2021-10-05 DIAGNOSIS — F51.01 PRIMARY INSOMNIA: ICD-10-CM

## 2021-10-05 DIAGNOSIS — R22.0 LIP SWELLING: Primary | ICD-10-CM

## 2021-10-05 PROCEDURE — 1090F PRES/ABSN URINE INCON ASSESS: CPT | Performed by: FAMILY MEDICINE

## 2021-10-05 PROCEDURE — 3017F COLORECTAL CA SCREEN DOC REV: CPT | Performed by: FAMILY MEDICINE

## 2021-10-05 PROCEDURE — 1036F TOBACCO NON-USER: CPT | Performed by: FAMILY MEDICINE

## 2021-10-05 PROCEDURE — G8484 FLU IMMUNIZE NO ADMIN: HCPCS | Performed by: FAMILY MEDICINE

## 2021-10-05 PROCEDURE — 4040F PNEUMOC VAC/ADMIN/RCVD: CPT | Performed by: FAMILY MEDICINE

## 2021-10-05 PROCEDURE — 99213 OFFICE O/P EST LOW 20 MIN: CPT | Performed by: FAMILY MEDICINE

## 2021-10-05 PROCEDURE — G8427 DOCREV CUR MEDS BY ELIG CLIN: HCPCS | Performed by: FAMILY MEDICINE

## 2021-10-05 PROCEDURE — G8399 PT W/DXA RESULTS DOCUMENT: HCPCS | Performed by: FAMILY MEDICINE

## 2021-10-05 PROCEDURE — G8417 CALC BMI ABV UP PARAM F/U: HCPCS | Performed by: FAMILY MEDICINE

## 2021-10-05 PROCEDURE — 1123F ACP DISCUSS/DSCN MKR DOCD: CPT | Performed by: FAMILY MEDICINE

## 2021-10-05 RX ORDER — LIDOCAINE 50 MG/G
OINTMENT TOPICAL
Qty: 10 G | Refills: 0 | Status: SHIPPED | OUTPATIENT
Start: 2021-10-05 | End: 2022-03-05 | Stop reason: SDUPTHER

## 2021-10-05 RX ORDER — PREDNISONE 20 MG/1
TABLET ORAL
Qty: 12 TABLET | Refills: 0 | Status: SHIPPED | OUTPATIENT
Start: 2021-10-05 | End: 2021-10-18 | Stop reason: SDUPTHER

## 2021-10-05 RX ORDER — LORAZEPAM 1 MG/1
TABLET ORAL
Qty: 60 TABLET | Refills: 2 | Status: SHIPPED | OUTPATIENT
Start: 2021-10-05 | End: 2022-01-11

## 2021-10-05 RX ORDER — LIDOCAINE HYDROCHLORIDE 20 MG/ML
SOLUTION OROPHARYNGEAL
Qty: 100 ML | Refills: 0 | Status: SHIPPED | OUTPATIENT
Start: 2021-10-05 | End: 2022-03-05 | Stop reason: SDUPTHER

## 2021-10-05 NOTE — PROGRESS NOTES
[facility] Medicine  Progress Note  Peng Bhakta  [birth year]    10/05/21    Chief Complaint:   Joe Upton is a 80 y.o. female who is here for swelling of the lips        HPI:   Has a diagnosis of oral lichen planus. Unfortunately has experienced intensely painful swelling of both lips with the lower lip more swollen than the upper lip. This started suddenly and while she did get her Covid booster the symptoms began before her Covid booster. It is painful to eat certain foods because of the ulceration on her lower lip. She has to consume soups instead. The pain is disturbing her sleep. Has tried Kenalog ointment as prescribed by ENT but finds that this has not been helpful in past episodes in which she tends to have more of a burning and painful sensation. What makes is different is that there is swelling this time. She is not known to have any food allergies or sensitivities. Her drug allergies include lisinopril. She had an appointment for October 18 for lorazepam review and renewal.  She finds that the medication is keeping her anxiety in check and requests renewal of the medication. ROS negative for headache, visionchanges, chest pain, shortness of breath, abdominal pain, urinary sx, bowel changes. Past medical, surgical, and social history reviewed. and allergies reviewed. Allergies   Allergen Reactions    Lisinopril Other (See Comments)     cough     Prior to Visit Medications    Medication Sig Taking? Authorizing Provider   lidocaine viscous hcl (XYLOCAINE) 2 % SOLN solution Swish and spit 5 mL the gargle solution for 10 to 15 seconds up to every 3 hours. Yes Arlene Stone, DO   lidocaine (XYLOCAINE) 5 % ointment Apply topically to the lips every 3 hours as needed. Yes Arlene Stone, DO   predniSONE (DELTASONE) 20 MG tablet Take 3 tablets by mouth daily for 1 day, THEN 2 tablets daily for 3 days, THEN 1 tablet daily for 3 days.  Yes Mir Stone, DO   LORazepam (ATIVAN) 1 MG tablet Take 1/2 to 1 tablet po BID prn anxiety or insomnia.  Yes Mir Stone, DO   amLODIPine (NORVASC) 10 MG tablet TAKE 1 TABLET EVERY DAY Yes Mir Stone, DO   valACYclovir (VALTREX) 1 g tablet TAKE 2 TABLETS AT  ONSET OF COLD SORE, REPEAT  12 HOURS LATER Yes Mir Stone, DO   omeprazole (PRILOSEC) 20 MG delayed release capsule TAKE 1 CAPSULE EVERY DAY Yes Mir Stone, DO   triamterene-hydroCHLOROthiazide (MAXZIDE) 75-50 MG per tablet TAKE 1 TABLET EVERY DAY Yes Mir Stone, DO   BYSTOLIC 5 MG tablet TAKE 1 TABLET EVERY DAY Yes Mir Stone, DO   Nutritional Supplements (VITAMIN D BOOSTER PO) Take 1 tablet by mouth daily Yes Historical Provider, MD   Misc Natural Products (TURMERIC CURCUMIN) CAPS Take by mouth Yes Historical Provider, MD   Multiple Vitamins-Minerals (HAIR SKIN AND NAILS FORMULA) TABS Take 1 tablet by mouth daily Yes Historical Provider, MD   aspirin 325 MG tablet Take 325 mg by mouth daily Yes Historical Provider, MD          Vitals:    10/05/21 0917   BP: 130/60   Pulse: 76   Resp: 12   Temp: 96.9 °F (36.1 °C)   TempSrc: Temporal   SpO2: 97%   Weight: 173 lb 9.6 oz (78.7 kg)      Wt Readings from Last 3 Encounters:   10/05/21 173 lb 9.6 oz (78.7 kg)   07/08/21 182 lb 3.2 oz (82.6 kg)   03/23/21 186 lb 6.4 oz (84.6 kg)     BP Readings from Last 3 Encounters:   10/05/21 130/60   07/08/21 115/69   03/23/21 125/83       Patient Active Problem List   Diagnosis    Depression    Menopausal syndrome    Generalized osteoarthrosis, involving multiple sites    Anxiety    Insomnia    Colonic polyp    Herpes simplex infection    Atopic dermatitis    Vitamin D deficiency    Oral lichen planus    Burning mouth syndrome    Essential hypertension    Mixed hyperlipidemia    Vitamin B 12 deficiency    Displaced trimalleolar fracture    Dislocation of talus, initial encounter    Radial head fracture, closed, left, closed, initial encounter    Closed left ankle fracture, initial encounter    Mild single current episode of major depressive disorder ([address]    Pre-diabetes    Lichen planus       Immunization History   Administered Date(s) Administered    COVID-19, Moderna, PF, 100mcg/0.5mL 02/17/2021, 03/18/2021, 08/28/2021    Influenza Virus Vaccine 11/18/2014, 09/23/2017, 10/10/2018    Influenza, High Dose (Fluzone 65 yrs and older) 11/30/2016, 11/01/2019, 10/08/2020    Influenza, Babar Cuna, Recombinant, IM PF (Flublok 18 yrs and older) 10/08/2020    Pneumococcal Conjugate 13-valent ([id number]) 12/04/2018    Pneumococcal Polysaccharide ([id number]) 08/28/2017    Tdap (Boostrix, Adacel) 09/13/2012    Zoster Recombinant (Shingrix) 08/01/2018, 03/07/2019       Past Medical History:   Diagnosis Date    Anxiety     Herpes     opthalmic    Hypertension     Insomnia     Radial head fracture, closed, left, closed, initial encounter 1/21/2018    Tinnitus      Past Surgical History:   Procedure Laterality Date    ANKLE SURGERY Left 01/21/2018    LEFT ANKLE OPEN REDUCTION INTERNAL FIXATION    BREAST BIOPSY      ELBOW SURGERY      right     HYSTERECTOMY      HYSTERECTOMY, TOTAL ABDOMINAL      SHOULDER ARTHROSCOPY Left     WISDOM TOOTH EXTRACTION       Family History   Problem Relation Age of Onset    Arthritis Mother     Heart Disease Mother     Heart Disease Father     High Blood Pressure Father     Cancer Father         liver, bladder    Arthritis Father     Heart Disease Sister     High Blood Pressure Brother     Arthritis Maternal Aunt     High Blood Pressure Brother      Social History     Socioeconomic History    Marital status:      Spouse name: Not on file    Number of children: Not on file    Years of education: Not on file    Highest education level: Not on file   Occupational History    Not on file   Tobacco Use    Smoking status: Former Smoker Packs/day: 0.75     Years: 20.00     Pack years: 15.00     Types: Cigarettes     Quit date: 1991     Years since quittin.6    Smokeless tobacco: Never Used   Vaping Use    Vaping Use: Never used   Substance and Sexual Activity    Alcohol use: Yes     Comment: very rare    Drug use: No    Sexual activity: Not on file   Other Topics Concern    Not on file   Social History Narrative    Not on file     Social Determinants of Health     Financial Resource Strain: Low Risk     Difficulty of Paying Living Expenses: Not hard at all   Food Insecurity: No Food Insecurity    Worried About Running Out of Food in the Last Year: Never true    Mat of Food in the Last Year: Never true   Transportation Needs: No Transportation Needs    Lack of Transportation (Medical): No    Lack of Transportation (Non-Medical): No   Physical Activity:     Days of Exercise per Week:     Minutes of Exercise per Session:    Stress:     Feeling of Stress :    Social Connections:     Frequency of Communication with Friends and Family:     Frequency of Social Gatherings with Friends and Family:     Attends Hindu Services:     Active Member of Clubs or Organizations:     Attends Club or Organization Meetings:     Marital Status:    Intimate Partner Violence:     Fear of Current or Ex-Partner:     Emotionally Abused:     Physically Abused:     Sexually Abused:        O: /60   Pulse 76   Temp 96.9 °F (36.1 °C) (Temporal)   Resp 12   Wt 173 lb 9.6 oz (78.7 kg)   SpO2 97%   BMI 31.75 kg/m²   Physical Exam  HENT:      Mouth/Throat:      Mouth: Mucous membranes are moist. No angioedema. Tongue: No lesions. Pharynx: Oropharyngeal exudate: Sores. Comments: Sores noted on vermilion border. The lower lip is moderately swollen. The upper lip is mildly swollen.   When inspecting the inner lips with the tongue depressor it is painful when the tongue depressor contacts her lips      GEN: No acute distress,cooperative, well nourished, alert. HEENT: PEERLA, EOMI , normocephalic/atraumatic, external nose appears normal.  External ear is normal.    Neck: soft, supple, no appreciable thyromegaly,mass  CV: No upper extremity edema. Resp:  Breathing comfortably. Psych:normal affect. Neuro: AOx3  Other Pertinent Physical Exam findings: See schematic        ASSESSMENT   Diagnosis Orders   1. Lip swelling  lidocaine viscous hcl (XYLOCAINE) 2 % SOLN solution    lidocaine (XYLOCAINE) 5 % ointment    predniSONE (DELTASONE) 20 MG tablet   2. Primary insomnia  LORazepam (ATIVAN) 1 MG tablet     #1  Possibly oral lichen planus. Up-to-date literature states that some need the oral prednisone for 4 to 6 weeks. I hope along with the patient is that we need less than 10 days. See plan below. #2: The current medical regimen is effective;  continue present plan and medications. Pt aware of need for every 3 month medication followup appointments, and that medication refills for benzodiazepines, narcotics and/or stimulants will only be given at appointment. The risks, benefits, potential side effects and barriers to medication use were addressed today. Understanding was acknowledged. Patient asked to follow-up if condition(s) do not improve as anticipated.       PLAN          Time spent on encounter (including any number of the following: review of labs, imaging, provider notes, outside hospital records; performing examination/evaluation; counseling patient and family; ordering medications/tests; placing referrals and communication with referring physicians; coordination of care, and documentation in the EHR): 24 minutes  Established E/M: 10-19 (28607), 20-29 (65551), 30-39 (67842), 40-54 (70992)   New E/M: 15-29 (56594), 30-44 (06686), 45-59 (08570), 60-74 (00898)  Telephone E/M: 5-10 (Suzette), 11-20 (72075), 21-30 (64596)    If applicable, see additional patient information and instructions under \"Patient Instructions. \"    Return in about 3 months (around 1/5/2022), or if symptoms worsen or fail to improve, for Anxiety. Patient Instructions   1) Start prednisone today. 2) Take the lidocaine ointment and gargle solution as needed. 3) Call this clinic with update in 2 to 3 days. 4) It might be worth getting ENT appt with Dr. Ho Kos         Please note a portion of this chart was generated using dragon dictation software. Although every effort was made to ensure the accuracy of this automated transcription,some errors in transcription may have occurred.

## 2021-10-18 ENCOUNTER — TELEPHONE (OUTPATIENT)
Dept: FAMILY MEDICINE CLINIC | Age: 71
End: 2021-10-18

## 2021-10-18 ENCOUNTER — OFFICE VISIT (OUTPATIENT)
Dept: FAMILY MEDICINE CLINIC | Age: 71
End: 2021-10-18
Payer: MEDICARE

## 2021-10-18 VITALS
WEIGHT: 171 LBS | TEMPERATURE: 97.8 F | SYSTOLIC BLOOD PRESSURE: 122 MMHG | BODY MASS INDEX: 31.28 KG/M2 | RESPIRATION RATE: 12 BRPM | DIASTOLIC BLOOD PRESSURE: 62 MMHG | OXYGEN SATURATION: 99 % | HEART RATE: 70 BPM

## 2021-10-18 DIAGNOSIS — L43.8 ORAL LICHEN PLANUS: Primary | ICD-10-CM

## 2021-10-18 DIAGNOSIS — R22.0 LIP SWELLING: ICD-10-CM

## 2021-10-18 PROCEDURE — 99213 OFFICE O/P EST LOW 20 MIN: CPT | Performed by: FAMILY MEDICINE

## 2021-10-18 PROCEDURE — 1123F ACP DISCUSS/DSCN MKR DOCD: CPT | Performed by: FAMILY MEDICINE

## 2021-10-18 PROCEDURE — 3017F COLORECTAL CA SCREEN DOC REV: CPT | Performed by: FAMILY MEDICINE

## 2021-10-18 PROCEDURE — 4040F PNEUMOC VAC/ADMIN/RCVD: CPT | Performed by: FAMILY MEDICINE

## 2021-10-18 PROCEDURE — G8417 CALC BMI ABV UP PARAM F/U: HCPCS | Performed by: FAMILY MEDICINE

## 2021-10-18 PROCEDURE — 1036F TOBACCO NON-USER: CPT | Performed by: FAMILY MEDICINE

## 2021-10-18 PROCEDURE — G8427 DOCREV CUR MEDS BY ELIG CLIN: HCPCS | Performed by: FAMILY MEDICINE

## 2021-10-18 PROCEDURE — G8399 PT W/DXA RESULTS DOCUMENT: HCPCS | Performed by: FAMILY MEDICINE

## 2021-10-18 PROCEDURE — G8484 FLU IMMUNIZE NO ADMIN: HCPCS | Performed by: FAMILY MEDICINE

## 2021-10-18 PROCEDURE — 1090F PRES/ABSN URINE INCON ASSESS: CPT | Performed by: FAMILY MEDICINE

## 2021-10-18 RX ORDER — PREDNISONE 20 MG/1
TABLET ORAL
Qty: 24 TABLET | Refills: 0 | Status: SHIPPED | OUTPATIENT
Start: 2021-10-18 | End: 2021-11-01

## 2021-10-18 RX ORDER — METOPROLOL SUCCINATE 50 MG/1
50 TABLET, EXTENDED RELEASE ORAL DAILY
Qty: 90 TABLET | Refills: 1 | Status: SHIPPED | OUTPATIENT
Start: 2021-10-18 | End: 2022-03-05

## 2021-10-18 NOTE — TELEPHONE ENCOUNTER
It looks like she didn't see Dr. Jordan Lau, in there records she has been seen there since 2012. It looks like her biopsy was mentioned in the encounter she had with Dr. Angy Bell. I was told to call back tomorrow to speak with a nurse there because they aren't able to get new pt in til jan-feb of 2022. Should I schedule with Dr. Angy Bell instead or stick with calling the nurse back?  Please advise  Thank you

## 2021-10-18 NOTE — TELEPHONE ENCOUNTER
Had biopsy of the lip in 2016 by Dr. Андрей Barone with [facility] dermatology. Please contact the clinic to see if we can get Celena Campa an appointment to see Dr. Андрей Barone or Dr. Lillian Galvez ideally to be seen anytime in the next 30 days, the sooner the better. If wait time is excessive then contact the dermatology group to see if they have a physician That is available for Celena Campa to see soon as possible. She has a confirmed diagnosis of oral lichen planus and the condition is worsening. She now is not able to tolerate certain foods due to the rawness of the lips on the corners.     Venita Fabry, MD Venita Fabry, MD  Professor of Dermatology    Specialties  dermatology    Locations  [facility] ([address] [place] [address])  [phone]  Patient Satisfaction      Locations  [facility] ([id number])  [phone]  [address] ([address] [place] [address])  [phone]  [address] [phone]  Patient Satisfaction  (4.8)  MD Parag Anderson MD   of Dermatology

## 2021-10-18 NOTE — PROGRESS NOTES
[facility]  Progress Note  Jv DO Author Aubrey Carpenter  [birth year]    10/18/21    Chief Complaint:   Author Aubrey is a 80 y.o. female who is here for oral lichen planus        HPI:   Oral lichen planus.  Originally biopsied by [facility] dermatology clinic in 2016 confirming diagnosis.  Has flareup in 2021. Yelena Splinter has been helpful.  Patient interested in other options.  Concerned that this could turn into cancer. Feels that she needs an extension the prednisone to help resolve this. ROS negative for headache, visionchanges, chest pain, shortness of breath, abdominal pain, urinary sx, bowel changes. Past medical, surgical, and social history reviewed. and allergies reviewed. Allergies   Allergen Reactions    Lisinopril Other (See Comments)     cough     Prior to Visit Medications    Medication Sig Taking? Authorizing Provider   predniSONE (DELTASONE) 20 MG tablet Take 3 tablets by mouth daily for 2 days, THEN 2 tablets daily for 6 days, THEN 1 tablet daily for 6 days. Yes Kristie Stone, DO   metoprolol succinate (TOPROL XL) 50 MG extended release tablet Take 1 tablet by mouth daily Yes Kristie Stone, DO   lidocaine viscous hcl (XYLOCAINE) 2 % SOLN solution Swish and spit 5 mL the gargle solution for 10 to 15 seconds up to every 3 hours. Yes Kristie Stone, DO   lidocaine (XYLOCAINE) 5 % ointment Apply topically to the lips every 3 hours as needed. Yes Kristie Stone, DO   LORazepam (ATIVAN) 1 MG tablet Take 1/2 to 1 tablet po BID prn anxiety or insomnia.  Yes Kristie Stone, DO   amLODIPine (NORVASC) 10 MG tablet TAKE 1 TABLET EVERY DAY Yes Kristie Stone, DO   valACYclovir (VALTREX) 1 g tablet TAKE 2 TABLETS AT  ONSET OF COLD SORE, REPEAT  12 HOURS LATER Yes Kristie Stone, DO   omeprazole (PRILOSEC) 20 MG delayed release capsule TAKE 1 CAPSULE EVERY DAY Yes Kristie Stone, DO   triamterene-hydroCHLOROthiazide ([place]) 75-50 MG per tablet TAKE 1 TABLET EVERY DAY Yes Wilber Brand Bingcang, DO   BYSTOLIC 5 MG tablet TAKE 1 TABLET EVERY DAY Yes Wilber Brand Bingcang, DO   Nutritional Supplements (VITAMIN D BOOSTER PO) Take 1 tablet by mouth daily Yes Historical Provider, MD [employer] ([facility]) CAPS Take by mouth Yes Historical Provider, MD   Multiple Vitamins-Minerals (HAIR SKIN AND NAILS FORMULA) TABS Take 1 tablet by mouth daily Yes Historical Provider, MD   aspirin 325 MG tablet Take 325 mg by mouth daily Yes Historical Provider, MD          Vitals:    10/18/21 1505   BP: 122/62   Pulse: 70   Resp: 12   Temp: 97.8 °F (36.6 °C)   TempSrc: Temporal   SpO2: 99%   Weight: 171 lb (77.6 kg)      Wt Readings from Last 3 Encounters:   10/18/21 171 lb (77.6 kg)   10/05/21 173 lb 9.6 oz (78.7 kg)   07/08/21 182 lb 3.2 oz (82.6 kg)     BP Readings from Last 3 Encounters:   10/18/21 122/62   10/05/21 130/60   07/08/21 115/69       Patient Active Problem List   Diagnosis    Depression    Menopausal syndrome    Generalized osteoarthrosis, involving multiple sites    Anxiety    Insomnia    Colonic polyp    Herpes simplex infection    Atopic dermatitis    Vitamin D deficiency    Oral lichen planus    Burning mouth syndrome    Essential hypertension    Mixed hyperlipidemia    Vitamin B 12 deficiency    Displaced trimalleolar fracture    Dislocation of talus, initial encounter    Radial head fracture, closed, left, closed, initial encounter    Closed left ankle fracture, initial encounter    Mild single current episode of major depressive disorder (HCC)    Pre-diabetes    Lichen planus       Immunization History   Administered Date(s) Administered    COVID-19, Moderna, PF, 100mcg/0.5mL 02/17/2021, 03/18/2021, 08/28/2021    Influenza Virus Vaccine 11/18/2014, 09/23/2017, 10/10/2018    Influenza, High Dose (Fluzone 65 yrs and older) 11/30/2016, 11/01/2019, 10/08/2020    Regla Buchanan, Recombinant, IM PF (Flublok 18 yrs and older) 10/08/2020    Pneumococcal Conjugate 13-valent ([id number]) 2018    Pneumococcal Polysaccharide (Cvvvilnzk03) 2017    Tdap (Boostrix, Adacel) 2012    Zoster Recombinant (Shingrix) 2018, 2019       Past Medical History:   Diagnosis Date    Anxiety     Herpes     opthalmic    Hypertension     Insomnia     Radial head fracture, closed, left, closed, initial encounter 2018    Tinnitus      Past Surgical History:   Procedure Laterality Date    ANKLE SURGERY Left 2018    LEFT ANKLE OPEN REDUCTION INTERNAL FIXATION    BREAST BIOPSY      ELBOW SURGERY      right     HYSTERECTOMY      HYSTERECTOMY, TOTAL ABDOMINAL      SHOULDER ARTHROSCOPY Left     WISDOM TOOTH EXTRACTION       Family History   Problem Relation Age of Onset    Arthritis Mother     Heart Disease Mother     Heart Disease Father     High Blood Pressure Father     Cancer Father         liver, bladder    Arthritis Father     Heart Disease Sister     High Blood Pressure Brother     Arthritis Maternal Aunt     High Blood Pressure Brother      Social History     Socioeconomic History    Marital status:      Spouse name: Not on file    Number of children: Not on file    Years of education: Not on file    Highest education level: Not on file   Occupational History    Not on file   Tobacco Use    Smoking status: Former Smoker     Packs/day: 0.75     Years: 20.00     Pack years: 15.00     Types: Cigarettes     Quit date: 1991     Years since quittin.6    Smokeless tobacco: Never Used   Vaping Use    Vaping Use: Never used   Substance and Sexual Activity    Alcohol use: Yes     Comment: very rare    Drug use: No    Sexual activity: Not on file   Other Topics Concern    Not on file   Social History Narrative    Not on file     Social Determinants of Health     Financial Resource Strain: Low Risk     Difficulty of Paying Living Expenses: Not hard at all   Food Insecurity: No Food Insecurity    Worried About [address] in the Last Year: Never true    Mat of Food in the Last Year: Never true   Transportation Needs: No Transportation Needs    Lack of Transportation (Medical): No    Lack of Transportation (Non-Medical): No   Physical Activity:     Days of Exercise per Week:     Minutes of Exercise per Session:    Stress:     Feeling of Stress :    Social Connections:     Frequency of Communication with Friends and Family:     Frequency of Social Gatherings with Friends and Family:     Attends Temple Services:     Active Member of Clubs or Organizations:     Attends Club or Organization Meetings:     Marital Status:    Intimate Partner Violence:     Fear of Current or Ex-Partner:     Emotionally Abused:     Physically Abused:     Sexually Abused:        O: /62   Pulse 70   Temp 97.8 °F (36.6 °C) (Temporal)   Resp 12   Wt 171 lb (77.6 kg)   SpO2 99%   BMI 31.28 kg/m²   Physical Exam  GEN: No acute distress,cooperative, well nourished, alert. HEENT: PEERLA, EOMI , normocephalic/atraumatic, external nose appears normal.  External ear is normal.    Neck: soft, supple, no appreciable thyromegaly,mass  CV: No upper extremity edema. Resp:  Breathing comfortably. Psych:normal affect. Neuro: AOx3  Other Pertinent Physical Exam findings: Not as many severe sores on the lower lip as compared to 13 days ago when I saw her in clinic. Lower lip is not as edematous. ASSESSMENT   Diagnosis Orders   1. Oral lichen planus  External Referral To Dermatology   2. Lip swelling  predniSONE (DELTASONE) 20 MG tablet       Refer to the dermatology clinic that could see her soon. In the meantime resume prednisone.         PLAN          Time spent on encounter (including any number of the following: review of labs, imaging, provider notes, outside hospital records; performing examination/evaluation; counseling patient and family; ordering medications/tests; placing referrals and communication with referring physicians; coordination of care, and documentation in the EHR): 20 minutes  Established E/M: 10-19 (40306), 20-29 (62914), 30-39 (93680), 40-54 (44670)   New E/M: 15-29 (41503), 30-44 (37125), 45-59 (94341), 60-74 (17305)  Telephone E/M: 5-10 (70558), 11-20 (80232), 21-30 (62102)    If applicable, see additional patient information and instructions under \"Patient Instructions. \"    Return if symptoms worsen or fail to improve. Patient Instructions   Expect update about a dermatology clinic to see you. Call us back Tomorrow afternoon or Wednesday if clinic has not gotten back to you. Resume the prednisone. Please note a portion of this chart was generated using dragon dictation software. Although every effort was made to ensure the accuracy of this automated transcription,some errors in transcription may have occurred.

## 2021-10-19 NOTE — TELEPHONE ENCOUNTER
Dr. Moreno Milder  9:10 am on November 1 95 [facility] # Monica Bones [place], [address] [place] [address]  [phone]    Pt advised through [address] [place] [address] [address] per pt request

## 2021-10-27 ENCOUNTER — TELEPHONE (OUTPATIENT)
Dept: FAMILY MEDICINE CLINIC | Age: 71
End: 2021-10-27

## 2021-10-27 NOTE — TELEPHONE ENCOUNTER
Patient requesting a referral to Rheumatologist. Patient would like to see  Dr. Jenna Mueller [phone]. Fax [phone]. Please call patient for more information. Please advise. Thanks.

## 2021-10-27 NOTE — TELEPHONE ENCOUNTER
I called dr Keven Roblero office and they are taking new pt but it is going to be a process due to staffing issues at their location. They stated that we would need to send Office notes and labs to them with the referral. The dr is only doing inflammatory appts and they are scheduling in march.  Please advise  Thank you

## 2021-10-28 NOTE — TELEPHONE ENCOUNTER
I know that patient originally requested to see Dr. Bryan Hilton. It appears in her records that she did see one of our [facility] rheumatologists in 2014. I will ask our clinic staff to call Dr. Heron Holloway to see if Dr. Heron Holloway has availability to see Stacy Schuster in November? If so then message back to me for referral entry and give patient phone number to make appointment.

## 2021-11-02 NOTE — TELEPHONE ENCOUNTER
Shasha Almanza office states that she needs the following labs done and a referral put in. Once the referral is put in the pt can call and get scheduled for sometime in November. Before that appt she will need to get her labs done. Once everything has been put in I will call the pt to give her there phone number of [phone].     Labs:  sed rate, CMP, CBC w auto deff, C-reactive for protein     Please advise  Thank you

## 2021-11-30 DIAGNOSIS — F51.04 PSYCHOPHYSIOLOGICAL INSOMNIA: ICD-10-CM

## 2021-11-30 RX ORDER — SUVOREXANT 15 MG/1
15 TABLET, FILM COATED ORAL NIGHTLY
Qty: 30 TABLET | Refills: 2 | Status: SHIPPED | OUTPATIENT
Start: 2021-11-30 | End: 2021-12-30

## 2021-11-30 NOTE — PROGRESS NOTES
Patient is under significant stress.  from her . Downsizing to an apartment. Can clinic staff call the [address] to verify prescription was received and inquire what her price is before we have her go to the pharmacy only to find out that it is too expensive for her? If prescription is more than $50 for 30 tablets then call in Rozerem 8 mg to take nightly for 30 tablets and inquire what that amount would be instead (goodrmaryjane shows $59 at [facility]). [address]   [address] [facility] [address]   Phone:  [phone]  Fax:  [phone]       Order Class:    Order Class   Normal [1]     Warnings History    No Interaction Warnings Shown    Suvorexant (BELSOMRA) 15 MG TABS    Date: 11/30/2021 Department: Alissa Morrow Roles Fm Ordering/Authorizing: Tobin Lam, DO     Outpatient Medication Detail     Disp Refills Start End    Suvorexant (BELSOMRA) 15 MG TABS 30 tablet 2 11/30/2021 12/30/2021    Sig - Route: Take 15 mg by mouth nightly for 30 doses.  - Oral    Sent to pharmacy as: Belsomra 15 MG Oral Tablet (Suvorexant)    E-Prescribing Status: Sent to pharmacy (11/30/2021 12:42 PM EST)

## 2021-12-15 ENCOUNTER — ANESTHESIA EVENT (OUTPATIENT)
Dept: ENDOSCOPY | Age: 71
End: 2021-12-15
Payer: MEDICARE

## 2021-12-15 NOTE — PROGRESS NOTES
[facility] [facility] time___12/717/21 1000_________        Surgery time___1100_________    Take the following medications with a sip of water:    Do not eat or drink anything after 12:00 midnight prior to your surgery. This includes water chewing gum, mints and ice chips. You may brush your teeth and gargle the morning of your surgery, but do not swallow the water     Please see your family doctor/pediatrician for a history and physical and/or concerning medications. Bring any test results/reports from your physicians office. If you are under the care of a heart doctor or specialist doctor, please be aware that you may be asked to them for clearance    You may be asked to stop blood thinners such as Coumadin, Plavix, Fragmin, Lovenox, etc., or any anti-inflammatories such as:  Aspirin, Ibuprofen, Advil, Naproxen prior to your surgery. We also ask that you stop any OTC medications such as fish oil, vitamin E, glucosamine, garlic, Multivitamins, COQ 10, etc.    We ask that you do not smoke 24 hours prior to surgery  We ask that you do not  drink any alcoholic beverages 24 hours prior to surgery     You must make arrangements for a responsible adult to take you home after your surgery. For your safety you will not be allowed to leave alone or drive yourself home. Your surgery will be cancelled if you do not have a ride home. Also for your safety, it is strongly suggested that someone stay with you the first 24 hours after your surgery. A parent or legal guardian must accompany a child scheduled for surgery and plan to stay at the hospital until the child is discharged. Please do not bring other children with you. For your comfort, please wear simple loose fitting clothing to the hospital.  Please do not bring valuables.     Do not wear any make-up or nail polish on your fingers or toes      For your safety, please do not wear any jewelry or body piercing's on the day of surgery. All jewelry must be removed. If you have dentures, they will be removed before going to operating room. For your convenience, we will provide you with a container. If you wear contact lenses or glasses, they will be removed, please bring a case for them. If you have a living will and a durable power of  for healthcare, please bring in a copy. As part of our patient safety program to minimize surgical site infections, we ask you to do the following:    · Please notify your surgeon if you develop any illness between         now and the  day of your surgery. · This includes a cough, cold, fever, sore throat, nausea,         or vomiting, and diarrhea, etc.  ·  Please notify your surgeon if you experience dizziness, shortness         of breath or blurred vision between now and the time of your surgery. Do not shave your operative site 96 hours prior to surgery. For face and neck surgery, men may use an electric razor 48 hours   prior to surgery. You may shower the night before surgery or the morning of   your surgery with an antibacterial soap. You will need to bring a photo ID and insurance card    [facility] has an onsite pharmacy, would you like to utilize our pharmacy     If you will be staying overnight and use a C-pap machine, please bring   your C-pap to hospital     Our goal is to provide you with excellent care, therefore, visitors will be limited to two(2) in the room at a time so that we may focus on providing this care for you. Please contact pre-admission testing if you have any further questions. [facility] phone number:  [phone]  Please note these are generalized instructions for all surgical cases, you may be provided with more specific instructions according to your surgery.

## 2021-12-17 ENCOUNTER — HOSPITAL ENCOUNTER (OUTPATIENT)
Age: 71
Setting detail: OUTPATIENT SURGERY
Discharge: HOME | End: 2021-12-17
Attending: INTERNAL MEDICINE | Admitting: INTERNAL MEDICINE
Payer: MEDICARE

## 2021-12-17 ENCOUNTER — ANESTHESIA (OUTPATIENT)
Dept: ENDOSCOPY | Age: 71
End: 2021-12-17
Payer: MEDICARE

## 2021-12-17 VITALS
SYSTOLIC BLOOD PRESSURE: 128 MMHG | RESPIRATION RATE: 15 BRPM | OXYGEN SATURATION: 98 % | DIASTOLIC BLOOD PRESSURE: 63 MMHG

## 2021-12-17 VITALS
WEIGHT: 182 LBS | TEMPERATURE: 97.8 F | OXYGEN SATURATION: 100 % | BODY MASS INDEX: 33.49 KG/M2 | HEIGHT: 62 IN | SYSTOLIC BLOOD PRESSURE: 99 MMHG | HEART RATE: 64 BPM | RESPIRATION RATE: 16 BRPM | DIASTOLIC BLOOD PRESSURE: 71 MMHG

## 2021-12-17 DIAGNOSIS — Z86.010 HISTORY OF COLON POLYPS: ICD-10-CM

## 2021-12-17 PROCEDURE — 2580000003 HC RX 258: Performed by: STUDENT IN AN ORGANIZED HEALTH CARE EDUCATION/TRAINING PROGRAM

## 2021-12-17 PROCEDURE — 7100000010 HC PHASE II RECOVERY - FIRST 15 MIN: Performed by: INTERNAL MEDICINE

## 2021-12-17 PROCEDURE — 3700000000 HC ANESTHESIA ATTENDED CARE: Performed by: INTERNAL MEDICINE

## 2021-12-17 PROCEDURE — 6360000002 HC RX W HCPCS: Performed by: NURSE ANESTHETIST, CERTIFIED REGISTERED

## 2021-12-17 PROCEDURE — 3609010600 HC COLONOSCOPY POLYPECTOMY SNARE/COLD BIOPSY: Performed by: INTERNAL MEDICINE

## 2021-12-17 PROCEDURE — 7100000011 HC PHASE II RECOVERY - ADDTL 15 MIN: Performed by: INTERNAL MEDICINE

## 2021-12-17 PROCEDURE — 88305 TISSUE EXAM BY PATHOLOGIST: CPT

## 2021-12-17 PROCEDURE — 3700000001 HC ADD 15 MINUTES (ANESTHESIA): Performed by: INTERNAL MEDICINE

## 2021-12-17 PROCEDURE — 2500000003 HC RX 250 WO HCPCS: Performed by: NURSE ANESTHETIST, CERTIFIED REGISTERED

## 2021-12-17 PROCEDURE — 2709999900 HC NON-CHARGEABLE SUPPLY: Performed by: INTERNAL MEDICINE

## 2021-12-17 RX ORDER — PROPOFOL 10 MG/ML
INJECTION, EMULSION INTRAVENOUS PRN
Status: DISCONTINUED | OUTPATIENT
Start: 2021-12-17 | End: 2021-12-17 | Stop reason: SDUPTHER

## 2021-12-17 RX ORDER — ONDANSETRON 2 MG/ML
4 INJECTION INTRAMUSCULAR; INTRAVENOUS
Status: DISCONTINUED | OUTPATIENT
Start: 2021-12-17 | End: 2021-12-17 | Stop reason: HOSPADM

## 2021-12-17 RX ORDER — PROPOFOL 10 MG/ML
INJECTION, EMULSION INTRAVENOUS CONTINUOUS PRN
Status: DISCONTINUED | OUTPATIENT
Start: 2021-12-17 | End: 2021-12-17 | Stop reason: SDUPTHER

## 2021-12-17 RX ORDER — LIDOCAINE HYDROCHLORIDE 20 MG/ML
INJECTION, SOLUTION EPIDURAL; INFILTRATION; INTRACAUDAL; PERINEURAL PRN
Status: DISCONTINUED | OUTPATIENT
Start: 2021-12-17 | End: 2021-12-17 | Stop reason: SDUPTHER

## 2021-12-17 RX ORDER — SODIUM CHLORIDE 0.9 % (FLUSH) 0.9 %
5-40 SYRINGE (ML) INJECTION PRN
Status: DISCONTINUED | OUTPATIENT
Start: 2021-12-17 | End: 2021-12-17 | Stop reason: HOSPADM

## 2021-12-17 RX ORDER — PROMETHAZINE HYDROCHLORIDE 25 MG/ML
6.25 INJECTION, SOLUTION INTRAMUSCULAR; INTRAVENOUS
Status: DISCONTINUED | OUTPATIENT
Start: 2021-12-17 | End: 2021-12-17 | Stop reason: HOSPADM

## 2021-12-17 RX ORDER — SODIUM CHLORIDE 0.9 % (FLUSH) 0.9 %
5-40 SYRINGE (ML) INJECTION EVERY 12 HOURS SCHEDULED
Status: DISCONTINUED | OUTPATIENT
Start: 2021-12-17 | End: 2021-12-17 | Stop reason: HOSPADM

## 2021-12-17 RX ORDER — SODIUM CHLORIDE 9 MG/ML
25 INJECTION, SOLUTION INTRAVENOUS PRN
Status: DISCONTINUED | OUTPATIENT
Start: 2021-12-17 | End: 2021-12-17 | Stop reason: HOSPADM

## 2021-12-17 RX ORDER — LABETALOL HYDROCHLORIDE 5 MG/ML
5 INJECTION, SOLUTION INTRAVENOUS EVERY 10 MIN PRN
Status: DISCONTINUED | OUTPATIENT
Start: 2021-12-17 | End: 2021-12-17 | Stop reason: HOSPADM

## 2021-12-17 RX ORDER — SODIUM CHLORIDE 9 MG/ML
INJECTION, SOLUTION INTRAVENOUS CONTINUOUS
Status: DISCONTINUED | OUTPATIENT
Start: 2021-12-17 | End: 2021-12-17 | Stop reason: HOSPADM

## 2021-12-17 RX ADMIN — SODIUM CHLORIDE: 9 INJECTION, SOLUTION INTRAVENOUS at 10:26

## 2021-12-17 RX ADMIN — PROPOFOL 150 MCG/KG/MIN: 10 INJECTION, EMULSION INTRAVENOUS at 10:36

## 2021-12-17 RX ADMIN — PROPOFOL 100 MG: 10 INJECTION, EMULSION INTRAVENOUS at 10:36

## 2021-12-17 RX ADMIN — LIDOCAINE HYDROCHLORIDE 60 MG: 20 INJECTION, SOLUTION EPIDURAL; INFILTRATION; INTRACAUDAL; PERINEURAL at 10:36

## 2021-12-17 ASSESSMENT — PAIN - FUNCTIONAL ASSESSMENT: PAIN_FUNCTIONAL_ASSESSMENT: 0-10

## 2021-12-17 ASSESSMENT — PAIN SCALES - GENERAL
PAINLEVEL_OUTOF10: 0
PAINLEVEL_OUTOF10: 0

## 2021-12-17 NOTE — PROCEDURES
[facility]  Endoscopy Note    Patient: Cyrus Riddle  :   Acct#: [de-identified]    Procedure: Colonoscopy with biopsy, polypectomy (snare cautery)    Date:  2021    Surgeon:  Anish Ruiz MD, MD    Referring Physician:  Makenzie Palma    Previous Colonoscopy: Yes  Date: unknown  Greater than 3 years? Yes    Preoperative Diagnosis:  surveillance    Postoperative Diagnosis:  Colon polyps    Anesthesia:  See anesthesia note    Indications: This is a 80y.o. year old female who presents today with previous adenomatous polyp. Procedure: An informed consent was obtained from the patient after explanation of indications, benefits, possible risks and complications of the procedure. The patient was then taken to the endoscopy suite, placed in the left lateral decubitus position, and the above IV anesthesia was administered. A digital rectal examination was performed and revealed negative without mass, lesions or tenderness. The Olympus CFQ-180-AL video colonoscope was placed in the patient's rectum under digital direction and advanced to the cecum. The cecum was identified by characteristic anatomy and ballottment. The ileocecal valve was identified. The preparation was good. The scope was then withdrawn back through the cecum, ascending, transverse, descending and sigmoid colons. Carefull circumferential examination of the mucosa in these areas demonstrated a 5 mm pedunculated polyp in the sigmoid colon that was snared and removed. There were three 2 to 4 mm polyps in the ascending colon that were biopsied and removed. The scope was then withdrawn into the rectum and retroflexed. The retroflexed view of the anal verge and rectum demonstrates no abnormalities. The scope was straightened, the colon was decompressed and the scope was withdrawn from the patient. The patient tolerated the procedure well and was taken to the PACU in good condition.     Estimated Blood Loss: Minimal.    Impression: Colon polyps    Recommendations:  Await pathology. Repeat colonoscopy in 5 years.     Martha Perry MD, MD   [facility]  12/17/2021

## 2021-12-17 NOTE — ANESTHESIA PRE PROCEDURE
[facility] Department of Anesthesiology  Pre-Anesthesia Evaluation/Consultation       Name:  Sid Ray  : 738  Age:  80 y.o.                                            MRN:  [id number]  Date: 2021           Surgeon: Surgeon(s):  Janet Pantoja MD    Procedure: Procedure(s):  COLONOSCOPY DIAGNOSTIC     Allergies   Allergen Reactions    Lisinopril Other (See Comments)     cough     Patient Active Problem List   Diagnosis    Depression    Menopausal syndrome    Generalized osteoarthrosis, involving multiple sites    Anxiety    Insomnia    Colonic polyp    Herpes simplex infection    Atopic dermatitis    Vitamin D deficiency    Oral lichen planus    Burning mouth syndrome    Essential hypertension    Mixed hyperlipidemia    Vitamin B 12 deficiency    Displaced trimalleolar fracture    Dislocation of talus, initial encounter    Radial head fracture, closed, left, closed, initial encounter    Closed left ankle fracture, initial encounter    Mild single current episode of major depressive disorder (HCC)    Pre-diabetes    Lichen planus     Past Medical History:   Diagnosis Date    Anxiety     Herpes     opthalmic    Hypertension     Insomnia     Radial head fracture, closed, left, closed, initial encounter 2018    Tinnitus      Past Surgical History:   Procedure Laterality Date    ANKLE SURGERY Left 2018    LEFT ANKLE OPEN REDUCTION INTERNAL FIXATION    BREAST BIOPSY      ELBOW SURGERY      right     HYSTERECTOMY      HYSTERECTOMY, TOTAL ABDOMINAL      SHOULDER ARTHROSCOPY Left     WISDOM TOOTH EXTRACTION       Social History     Tobacco Use    Smoking status: Former Smoker     Packs/day: 0.75     Years: 20.00     Pack years: 15.00     Types: Cigarettes     Quit date: 1991     Years since quittin.8    Smokeless tobacco: Never Used   Vaping Use    Vaping Use: Never used   Substance Use Topics    Alcohol use: Yes     Comment: very rare    Drug use: No     Medications  No current facility-administered medications on file prior to encounter. Current Outpatient Medications on File Prior to Encounter   Medication Sig Dispense Refill    metoprolol succinate (TOPROL XL) 50 MG extended release tablet Take 1 tablet by mouth daily 90 tablet 1    lidocaine viscous hcl (XYLOCAINE) 2 % SOLN solution Swish and spit 5 mL the gargle solution for 10 to 15 seconds up to every 3 hours. 100 mL 0    amLODIPine (NORVASC) 10 MG tablet TAKE 1 TABLET EVERY DAY 90 tablet 3    omeprazole (PRILOSEC) 20 MG delayed release capsule TAKE 1 CAPSULE EVERY DAY 90 capsule 3    triamterene-hydroCHLOROthiazide (MAXZIDE) 75-50 MG per tablet TAKE 1 TABLET EVERY DAY 90 tablet 3    BYSTOLIC 5 MG tablet TAKE 1 TABLET EVERY DAY 90 tablet 1    Nutritional Supplements (VITAMIN D BOOSTER PO) Take 1 tablet by mouth daily      Misc Natural Products (TURMERIC CURCUMIN) CAPS Take by mouth      Multiple Vitamins-Minerals (HAIR SKIN AND NAILS FORMULA) TABS Take 1 tablet by mouth daily      aspirin 325 MG tablet Take 325 mg by mouth daily      ramelteon (ROZEREM) 8 MG tablet Take 1 tablet by mouth nightly as needed for Sleep 30 tablet 5    Suvorexant (BELSOMRA) 15 MG TABS Take 15 mg by mouth nightly for 30 doses. 30 tablet 2    lidocaine (XYLOCAINE) 5 % ointment Apply topically to the lips every 3 hours as needed.  10 g 0    valACYclovir (VALTREX) 1 g tablet TAKE 2 TABLETS AT  ONSET OF COLD SORE, REPEAT  12 HOURS LATER 36 tablet 3     Current Facility-Administered Medications   Medication Dose Route Frequency Provider Last Rate Last Admin    0.9 % sodium chloride infusion   IntraVENous Continuous Aggie Villavicencio MD        sodium chloride flush 0.9 % injection 5-40 mL  5-40 mL IntraVENous 2 times per day Aggie Villavicencio MD        sodium chloride flush 0.9 % injection 5-40 mL  5-40 mL IntraVENous PRN Aggie Villavicencio MD        0.9 % sodium chloride infusion  25 mL IntraVENous PRN CALCIUM 9.8 12/22/2020    GFRAA >60 12/22/2020    GFRAA >60 08/23/2012    LABGLOM >60 12/22/2020    GLUCOSE 111 12/22/2020     POCGlucose  No results for input(s): GLUCOSE in the last 72 hours. [facility]    Lab Results   Component Value Date    PROTIME 11.8 01/20/2018    INR 1.04 56/72/1165     HCG (If Applicable) No results found for: PREGTESTUR, PREGSERUM, HCG, HCGQUANT   ABGs No results found for: PHART, PO2ART, JUG6VCB, XWG9IVW, BEART, B7ASWCPW   Type & Screen (If Applicable)  No results found for: LABABO, LABRH                         BMI: Wt Readings from Last 3 Encounters:       NPO Status: [address]                          Anesthesia Evaluation  Patient summary reviewed no history of anesthetic complications:   Airway: Mallampati: III  TM distance: >3 FB   Neck ROM: full   Dental:    (+) caps      Pulmonary:Negative Pulmonary ROS and normal exam                               Cardiovascular:  Exercise tolerance: good (>4 METS),   (+) hypertension:, dysrhythmias (1AVB):,       ECG reviewed  Rhythm: regular  Rate: normal           Beta Blocker:  Dose within 24 Hrs         Neuro/Psych:   Negative Neuro/Psych ROS  (+) psychiatric history:depression/anxiety             GI/Hepatic/Renal: Neg GI/Hepatic/Renal ROS  (+) bowel prep,      (-) GERD       Endo/Other: Negative Endo/Other ROS                    Abdominal:   (+) obese,           Vascular: Other Findings:             Anesthesia Plan      MAC     ASA 3       Induction: intravenous. Anesthetic plan and risks discussed with patient. Plan discussed with CRNA. This pre-anesthesia assessment may be used as a history and physical.    DOS STAFF ADDENDUM:    Pt seen and examined, chart reviewed (including anesthesia, drug and allergy history). No interval changes to history and physical examination. Anesthetic plan, risks, benefits, alternatives, and personnel involved discussed with patient. Questions and concerns addressed. Patient(family) verbalized an understanding and agrees to proceed.       Nellie Wei MD  December 17, 2021  10:25 AM

## 2021-12-17 NOTE — H&P
[facility]   Pre-operative History and Physical    Patient: Joe Upton  : 1950  Acct#: [de-identified]    History Obtained From: electronic medical record    HISTORY OF PRESENT ILLNESS  Procedure:Colonoscopy  Indications:surveillance  Past Medical History:        Diagnosis Date    Anxiety     Herpes     opthalmic    Hypertension     Insomnia     Radial head fracture, closed, left, closed, initial encounter 2018    Tinnitus      Past Surgical History:        Procedure Laterality Date    ANKLE SURGERY Left 2018    LEFT ANKLE OPEN REDUCTION INTERNAL FIXATION    BREAST BIOPSY      ELBOW SURGERY      right     HYSTERECTOMY      HYSTERECTOMY, TOTAL ABDOMINAL      SHOULDER ARTHROSCOPY Left     WISDOM TOOTH EXTRACTION       Medications prior to admission:   Prior to Admission medications    Medication Sig Start Date End Date Taking? Authorizing Provider   metoprolol succinate (TOPROL XL) 50 MG extended release tablet Take 1 tablet by mouth daily 10/18/21  Yes Arlene Stone DO   lidocaine viscous hcl (XYLOCAINE) 2 % SOLN solution Swish and spit 5 mL the gargle solution for 10 to 15 seconds up to every 3 hours.  10/5/21  Yes Arlene Stone DO   amLODIPine (NORVASC) 10 MG tablet TAKE 1 TABLET EVERY DAY 21  Yes Arlene Stone DO   omeprazole (PRILOSEC) 20 MG delayed release capsule TAKE 1 CAPSULE EVERY DAY 21  [facility]   triamterene-hydroCHLOROthiazide (MAXZIDE) 75-50 MG per tablet TAKE 1 TABLET EVERY DAY 21  [facility]   BYSTOLIC 5 MG tablet TAKE 1 TABLET EVERY DAY 21  [facility]   Nutritional Supplements (VITAMIN D BOOSTER PO) Take 1 tablet by mouth daily   Yes Historical Provider, MD   [facility] Products ([address] [facility]) CAPS Take by mouth   Yes Historical Provider, MD   Multiple Vitamins-Minerals (HAIR SKIN AND NAILS FORMULA) TABS Take 1 tablet by mouth daily   Yes Historical Provider, MD   aspirin 325 MG tablet Take 325 mg by mouth daily   Yes Historical Provider, MD   ramelteon (ROZEREM) 8 MG tablet Take 1 tablet by mouth nightly as needed for Sleep 21  Luis Miguel Stone DO   Suvorexant (BELSOMRA) 15 MG TABS Take 15 mg by mouth nightly for 30 doses. 21  Luis Miguel Stone DO   lidocaine (XYLOCAINE) 5 % ointment Apply topically to the lips every 3 hours as needed. 10/5/21   Clinton Stone,    valACYclovir (VALTREX) 1 g tablet TAKE 2 TABLETS AT  ONSET OF COLD SORE, REPEAT  12 HOURS LATER 21   Luis Miguel Stone DO     Allergies:   Lisinopril    Social History     Socioeconomic History    Marital status:      Spouse name: Not on file    Number of children: Not on file    Years of education: Not on file    Highest education level: Not on file   Occupational History    Not on file   Tobacco Use    Smoking status: Former Smoker     Packs/day: 0.75     Years: 20.00     Pack years: 15.00     Types: Cigarettes     Quit date: 1991     Years since quittin.8    Smokeless tobacco: Never Used   Vaping Use    Vaping Use: Never used   Substance and Sexual Activity    Alcohol use: Yes     Comment: very rare    Drug use: No    Sexual activity: Not Currently   Other Topics Concern    Not on file   Social History Narrative    Not on file     Social Determinants of Health     Financial Resource Strain: Low Risk     Difficulty of Paying Living Expenses: Not hard at all   Food Insecurity: No Food Insecurity    Worried About Running Out of Food in the Last Year: Never true    Mat of Food in the Last Year: Never true   Transportation Needs: No Transportation Needs    Lack of Transportation (Medical): No    Lack of Transportation (Non-Medical):  No   Physical Activity:     Days of Exercise per Week: Not on file    Minutes of Exercise per Session: Not on file   Stress:     Feeling of Stress : Not on file   Social Connections:     Frequency of Communication with Friends and Family: Not on file    Frequency of Social Gatherings with Friends and Family: Not on file    Attends Orthodox Services: Not on file    Active Member of Clubs or Organizations: Not on file    Attends Club or Organization Meetings: Not on file    Marital Status: Not on file   Intimate Partner Violence:     Fear of Current or Ex-Partner: Not on file    Emotionally Abused: Not on file    Physically Abused: Not on file    Sexually Abused: Not on file   Housing Stability:     Unable to Pay for Housing in the Last Year: Not on file    Number of [place] in the Last Year: Not on file    Unstable Housing in the Last Year: Not on file     Family History   Problem Relation Age of Onset    Arthritis Mother     Heart Disease Mother     Heart Disease Father     High Blood Pressure Father     Cancer Father         liver, bladder    Arthritis Father     Heart Disease Sister     High Blood Pressure Brother     Arthritis Maternal Aunt     High Blood Pressure Brother          PHYSICAL EXAM:      BP (!) 126/90   Pulse 63   Temp 97.5 °F (36.4 °C) (Temporal)   Resp 16   Ht 5' 2\" (1.575 m)   Wt 182 lb (82.6 kg)   SpO2 99%   BMI 33.29 kg/m²  I        Heart:normal    Lungs: normal    Abdomen: normal      ASA Grade:  See anesthesia note      ASSESSMENT AND PLAN:    1. Procedure options, risks and benefits reviewed with patient and expresses understanding.

## 2021-12-17 NOTE — ANESTHESIA POSTPROCEDURE EVALUATION
[facility] Department of Anesthesiology  Post-Anesthesia Note       Name:  Dinah Hernandez                                  Age:  80 y.o. MRN:  [id number]     Last Vitals & Oxygen Saturation: BP 99/71   Pulse 64   Temp 97.8 °F (36.6 °C) (Temporal)   Resp 16   Ht 5' 2\" (1.575 m)   Wt 182 lb (82.6 kg)   SpO2 100%   BMI 33.29 kg/m²   Patient Vitals for the past 4 hrs:   BP Temp Temp src Pulse Resp SpO2 Height Weight   12/17/21 1109 99/71   64 16 100 %     12/17/21 1059 (!) 97/51 97.8 °F (36.6 °C) Temporal 71 16 98 %     12/17/21 1015 (!) 126/90 97.5 °F (36.4 °C) Temporal 63 16 99 % 5' 2\" (1.575 m) 182 lb (82.6 kg)       Level of consciousness:  Awake, alert    Respiratory: Respirations easy, no distress. Stable. Cardiovascular: Hemodynamically stable. Hydration: Adequate. PONV: Adequately managed. Post-op pain: Adequately controlled. Post-op assessment: Tolerated anesthetic well without complication. Complications:  None.     Lavonne Beckham MD  December 17, 2021   1:07 PM

## 2022-01-10 DIAGNOSIS — F51.01 PRIMARY INSOMNIA: ICD-10-CM

## 2022-01-11 RX ORDER — LORAZEPAM 1 MG/1
TABLET ORAL
Qty: 60 TABLET | Refills: 0 | Status: SHIPPED | OUTPATIENT
Start: 2022-01-11 | End: 2022-02-10

## 2022-01-11 NOTE — TELEPHONE ENCOUNTER
Requested Prescriptions     Pending Prescriptions Disp Refills    LORazepam (ATIVAN) 1 MG tablet [Pharmacy Med Name: LORazepam 1 MG TABLET] 60 tablet      Sig: TAKE ONE-HALF TO ONE TABLET BY MOUTH TWICE A DAY AS NEEDED FOR ANXIETY OR INSOMNIA     Last ov 10/18/21  Last lab 12/22/20

## 2022-03-02 NOTE — TELEPHONE ENCOUNTER
Requested Prescriptions     Pending Prescriptions Disp Refills    metoprolol succinate (TOPROL XL) 50 MG extended release tablet [Pharmacy Med Name: METOPROLOL SUCCINATE ER 50 MG Tablet Extended Release 24 Hour] 90 tablet 1     Sig: TAKE 1 TABLET EVERY DAY     Last ov 10/18/21  Last lab 12/22/20

## 2022-03-04 ENCOUNTER — PATIENT MESSAGE (OUTPATIENT)
Dept: FAMILY MEDICINE CLINIC | Age: 72
End: 2022-03-04

## 2022-03-04 DIAGNOSIS — I10 ESSENTIAL HYPERTENSION: ICD-10-CM

## 2022-03-04 DIAGNOSIS — B00.1 COLD SORE: ICD-10-CM

## 2022-03-04 DIAGNOSIS — F51.04 PSYCHOPHYSIOLOGICAL INSOMNIA: ICD-10-CM

## 2022-03-04 DIAGNOSIS — K21.9 GASTROESOPHAGEAL REFLUX DISEASE: ICD-10-CM

## 2022-03-04 DIAGNOSIS — R22.0 LIP SWELLING: ICD-10-CM

## 2022-03-04 DIAGNOSIS — F51.01 PRIMARY INSOMNIA: ICD-10-CM

## 2022-03-04 NOTE — TELEPHONE ENCOUNTER
From: Daniela Peck  To: Dr. Deo Dye  Sent: 3/4/2022 3:01 PM EST  Subject: RX    God bless you and your family !!  Can you renew all my RX ( [employer] )  Sleep pill  And lorazepam   So I have time to find another Doctor!   Thank for taking care of on family after Doctor Pfkassidym Retired !!

## 2022-03-04 NOTE — TELEPHONE ENCOUNTER
Requested Prescriptions     Pending Prescriptions Disp Refills    amLODIPine (NORVASC) 10 MG tablet 90 tablet 3     Sig: TAKE 1 TABLET EVERY DAY    nebivolol (BYSTOLIC) 5 MG tablet 90 tablet 1    lidocaine (XYLOCAINE) 5 % ointment 10 g 0     Sig: Apply topically to the lips every 3 hours as needed.  lidocaine viscous hcl (XYLOCAINE) 2 % SOLN solution 100 mL 0     Sig: Swish and spit 5 mL the gargle solution for 10 to 15 seconds up to every 3 hours.     LORazepam (ATIVAN) 1 MG tablet 60 tablet 0    omeprazole (PRILOSEC) 20 MG delayed release capsule 90 capsule 3    ramelteon (ROZEREM) 8 MG tablet 30 tablet 5     Sig: Take 1 tablet by mouth nightly as needed for Sleep    triamterene-hydroCHLOROthiazide (MAXZIDE) 75-50 MG per tablet 90 tablet 3    valACYclovir (VALTREX) 1 g tablet 36 tablet 3     Sig: TAKE 2 TABLETS AT  ONSET OF COLD SORE, REPEAT  12 HOURS LATER

## 2022-03-05 RX ORDER — LORAZEPAM 1 MG/1
TABLET ORAL
Qty: 60 TABLET | Refills: 0 | Status: SHIPPED | OUTPATIENT
Start: 2022-03-05 | End: 2022-04-04

## 2022-03-05 RX ORDER — TRIAMTERENE AND HYDROCHLOROTHIAZIDE 75; 50 MG/1; MG/1
TABLET ORAL
Qty: 90 TABLET | Refills: 3 | Status: SHIPPED | OUTPATIENT
Start: 2022-03-05 | End: 2022-07-20

## 2022-03-05 RX ORDER — OMEPRAZOLE 20 MG/1
CAPSULE, DELAYED RELEASE ORAL
Qty: 90 CAPSULE | Refills: 3 | Status: SHIPPED | OUTPATIENT
Start: 2022-03-05 | End: 2022-08-09

## 2022-03-05 RX ORDER — VALACYCLOVIR HYDROCHLORIDE 1 G/1
TABLET, FILM COATED ORAL
Qty: 36 TABLET | Refills: 3 | Status: SHIPPED | OUTPATIENT
Start: 2022-03-05

## 2022-03-05 RX ORDER — AMLODIPINE BESYLATE 10 MG/1
TABLET ORAL
Qty: 90 TABLET | Refills: 3 | Status: SHIPPED | OUTPATIENT
Start: 2022-03-05 | End: 2022-06-28 | Stop reason: SINTOL

## 2022-03-05 RX ORDER — LIDOCAINE 50 MG/G
OINTMENT TOPICAL
Qty: 10 G | Refills: 0 | Status: SHIPPED | OUTPATIENT
Start: 2022-03-05 | End: 2022-08-09

## 2022-03-05 RX ORDER — METOPROLOL SUCCINATE 50 MG/1
TABLET, EXTENDED RELEASE ORAL
Qty: 90 TABLET | Refills: 1 | Status: SHIPPED | OUTPATIENT
Start: 2022-03-05 | End: 2022-07-20

## 2022-03-05 RX ORDER — RAMELTEON 8 MG/1
8 TABLET ORAL NIGHTLY PRN
Qty: 30 TABLET | Refills: 5 | Status: SHIPPED | OUTPATIENT
Start: 2022-03-05 | End: 2022-08-09

## 2022-03-05 RX ORDER — LIDOCAINE HYDROCHLORIDE 20 MG/ML
SOLUTION OROPHARYNGEAL
Qty: 100 ML | Refills: 0 | Status: SHIPPED | OUTPATIENT
Start: 2022-03-05 | End: 2022-05-26

## 2022-03-05 RX ORDER — NEBIVOLOL 5 MG/1
TABLET ORAL
Qty: 90 TABLET | Refills: 3 | Status: SHIPPED | OUTPATIENT
Start: 2022-03-05 | End: 2022-05-26

## 2022-05-26 ENCOUNTER — OFFICE VISIT (OUTPATIENT)
Dept: FAMILY MEDICINE CLINIC | Age: 72
End: 2022-05-26
Payer: MEDICARE

## 2022-05-26 VITALS
HEART RATE: 76 BPM | DIASTOLIC BLOOD PRESSURE: 76 MMHG | BODY MASS INDEX: 27.99 KG/M2 | SYSTOLIC BLOOD PRESSURE: 122 MMHG | TEMPERATURE: 98.4 F | HEIGHT: 65 IN | WEIGHT: 168 LBS | OXYGEN SATURATION: 98 %

## 2022-05-26 DIAGNOSIS — L43.9 LICHEN PLANUS: ICD-10-CM

## 2022-05-26 DIAGNOSIS — I10 ESSENTIAL HYPERTENSION: ICD-10-CM

## 2022-05-26 DIAGNOSIS — Z00.00 MEDICARE ANNUAL WELLNESS VISIT, SUBSEQUENT: Primary | ICD-10-CM

## 2022-05-26 DIAGNOSIS — E78.2 MIXED HYPERLIPIDEMIA: ICD-10-CM

## 2022-05-26 DIAGNOSIS — Z12.83 SCREENING FOR SKIN CANCER: ICD-10-CM

## 2022-05-26 DIAGNOSIS — Z00.00 MEDICARE ANNUAL WELLNESS VISIT, SUBSEQUENT: ICD-10-CM

## 2022-05-26 DIAGNOSIS — Z12.31 ENCOUNTER FOR SCREENING MAMMOGRAM FOR MALIGNANT NEOPLASM OF BREAST: ICD-10-CM

## 2022-05-26 LAB
A/G RATIO: 1.8 (ref 1.1–2.2)
ALBUMIN SERPL-MCNC: 4.7 G/DL (ref 3.4–5)
ALP BLD-CCNC: 100 U/L (ref 40–129)
ALT SERPL-CCNC: 23 U/L (ref 10–40)
ANION GAP SERPL CALCULATED.3IONS-SCNC: 15 MMOL/L (ref 3–16)
AST SERPL-CCNC: 25 U/L (ref 15–37)
BILIRUB SERPL-MCNC: 0.4 MG/DL (ref 0–1)
BUN BLDV-MCNC: 18 MG/DL (ref 7–20)
CALCIUM SERPL-MCNC: 9.8 MG/DL (ref 8.3–10.6)
CHLORIDE BLD-SCNC: 96 MMOL/L (ref 99–110)
CHOLESTEROL, FASTING: 183 MG/DL (ref 0–199)
CO2: 27 MMOL/L (ref 21–32)
CREAT SERPL-MCNC: 0.8 MG/DL (ref 0.6–1.2)
GFR AFRICAN AMERICAN: >60
GFR NON-AFRICAN AMERICAN: >60
GLUCOSE BLD-MCNC: 106 MG/DL (ref 70–99)
HCT VFR BLD CALC: 45 % (ref 36–48)
HDLC SERPL-MCNC: 57 MG/DL (ref 40–60)
HEMOGLOBIN: 15.1 G/DL (ref 12–16)
LDL CHOLESTEROL CALCULATED: 94 MG/DL
MCH RBC QN AUTO: 27 PG (ref 26–34)
MCHC RBC AUTO-ENTMCNC: 33.6 G/DL (ref 31–36)
MCV RBC AUTO: 80.2 FL (ref 80–100)
PDW BLD-RTO: 13.7 % (ref 12.4–15.4)
PLATELET # BLD: 341 K/UL (ref 135–450)
PMV BLD AUTO: 7.7 FL (ref 5–10.5)
POTASSIUM SERPL-SCNC: 3.6 MMOL/L (ref 3.5–5.1)
RBC # BLD: 5.62 M/UL (ref 4–5.2)
SODIUM BLD-SCNC: 138 MMOL/L (ref 136–145)
TOTAL PROTEIN: 7.3 G/DL (ref 6.4–8.2)
TRIGLYCERIDE, FASTING: 160 MG/DL (ref 0–150)
TSH REFLEX: 1.91 UIU/ML (ref 0.27–4.2)
VLDLC SERPL CALC-MCNC: 32 MG/DL
WBC # BLD: 12.4 K/UL (ref 4–11)

## 2022-05-26 PROCEDURE — G0439 PPPS, SUBSEQ VISIT: HCPCS | Performed by: NURSE PRACTITIONER

## 2022-05-26 PROCEDURE — 1123F ACP DISCUSS/DSCN MKR DOCD: CPT | Performed by: NURSE PRACTITIONER

## 2022-05-26 PROCEDURE — 3017F COLORECTAL CA SCREEN DOC REV: CPT | Performed by: NURSE PRACTITIONER

## 2022-05-26 ASSESSMENT — PATIENT HEALTH QUESTIONNAIRE - PHQ9
4. FEELING TIRED OR HAVING LITTLE ENERGY: 0
2. FEELING DOWN, DEPRESSED OR HOPELESS: 0
3. TROUBLE FALLING OR STAYING ASLEEP: 2
SUM OF ALL RESPONSES TO PHQ QUESTIONS 1-9: 2
SUM OF ALL RESPONSES TO PHQ9 QUESTIONS 1 & 2: 0
10. IF YOU CHECKED OFF ANY PROBLEMS, HOW DIFFICULT HAVE THESE PROBLEMS MADE IT FOR YOU TO DO YOUR WORK, TAKE CARE OF THINGS AT HOME, OR GET ALONG WITH OTHER PEOPLE: 0
9. THOUGHTS THAT YOU WOULD BE BETTER OFF DEAD, OR OF HURTING YOURSELF: 0
SUM OF ALL RESPONSES TO PHQ QUESTIONS 1-9: 2
SUM OF ALL RESPONSES TO PHQ QUESTIONS 1-9: 2
1. LITTLE INTEREST OR PLEASURE IN DOING THINGS: 0
SUM OF ALL RESPONSES TO PHQ QUESTIONS 1-9: 2
7. TROUBLE CONCENTRATING ON THINGS, SUCH AS READING THE NEWSPAPER OR WATCHING TELEVISION: 0
5. POOR APPETITE OR OVEREATING: 0
8. MOVING OR SPEAKING SO SLOWLY THAT OTHER PEOPLE COULD HAVE NOTICED. OR THE OPPOSITE, BEING SO FIGETY OR RESTLESS THAT YOU HAVE BEEN MOVING AROUND A LOT MORE THAN USUAL: 0
6. FEELING BAD ABOUT YOURSELF - OR THAT YOU ARE A FAILURE OR HAVE LET YOURSELF OR YOUR FAMILY DOWN: 0

## 2022-05-26 NOTE — PATIENT INSTRUCTIONS
Personalized Preventive Plan for Roselia Quesada - 5/26/2022  Medicare offers a range of preventive health benefits. Some of the tests and screenings are paid in full while other may be subject to a deductible, co-insurance, and/or copay. Some of these benefits include a comprehensive review of your medical history including lifestyle, illnesses that may run in your family, and various assessments and screenings as appropriate. After reviewing your medical record and screening and assessments performed today your provider may have ordered immunizations, labs, imaging, and/or referrals for you. A list of these orders (if applicable) as well as your Preventive Care list are included within your After Visit Summary for your review. Other Preventive Recommendations:    · A preventive eye exam performed by an eye specialist is recommended every 1-2 years to screen for glaucoma; cataracts, macular degeneration, and other eye disorders. · A preventive dental visit is recommended every 6 months. · Try to get at least 150 minutes of exercise per week or 10,000 steps per day on a pedometer . · Order or download the FREE \"Exercise & Physical Activity: Your Everyday Guide\" from [employer]. Call 4-600.564.8040 or search [employer] online. · You need 8109-9431 mg of calcium and 7437-9651 IU of vitamin D per day. It is possible to meet your calcium requirement with diet alone, but a vitamin D supplement is usually necessary to meet this goal.  · When exposed to the sun, use a sunscreen that protects against both UVA and UVB radiation with an SPF of 30 or greater. Reapply every 2 to 3 hours or after sweating, drying off with a towel, or swimming. · Always wear a seat belt when traveling in a car. Always wear a helmet when riding a bicycle or motorcycle.

## 2022-05-26 NOTE — ASSESSMENT & PLAN NOTE
Ongoing problem for several years. Referral to Dr. Lilibeth King given to patient for further evaluation.

## 2022-05-27 LAB
ESTIMATED AVERAGE GLUCOSE: 114 MG/DL
HBA1C MFR BLD: 5.6 %

## 2022-06-10 ENCOUNTER — HOSPITAL ENCOUNTER (OUTPATIENT)
Dept: MAMMOGRAPHY | Age: 72
Discharge: HOME | End: 2022-06-10
Payer: MEDICARE

## 2022-06-10 VITALS — BODY MASS INDEX: 27.99 KG/M2 | HEIGHT: 65 IN | WEIGHT: 168 LBS

## 2022-06-10 DIAGNOSIS — Z12.31 ENCOUNTER FOR SCREENING MAMMOGRAM FOR MALIGNANT NEOPLASM OF BREAST: ICD-10-CM

## 2022-06-10 PROCEDURE — 77063 BREAST TOMOSYNTHESIS BI: CPT

## 2022-06-16 ENCOUNTER — HOSPITAL ENCOUNTER (OUTPATIENT)
Dept: ULTRASOUND IMAGING | Age: 72
Discharge: HOME | End: 2022-06-16
Payer: MEDICARE

## 2022-06-16 DIAGNOSIS — R92.8 ABNORMAL MAMMOGRAM: ICD-10-CM

## 2022-06-16 PROCEDURE — 76642 ULTRASOUND BREAST LIMITED: CPT

## 2022-06-28 ENCOUNTER — OFFICE VISIT (OUTPATIENT)
Dept: FAMILY MEDICINE CLINIC | Age: 72
End: 2022-06-28
Payer: MEDICARE

## 2022-06-28 VITALS
TEMPERATURE: 97.2 F | SYSTOLIC BLOOD PRESSURE: 124 MMHG | BODY MASS INDEX: 27.46 KG/M2 | WEIGHT: 165 LBS | HEART RATE: 70 BPM | OXYGEN SATURATION: 99 % | DIASTOLIC BLOOD PRESSURE: 82 MMHG

## 2022-06-28 DIAGNOSIS — R00.2 PALPITATIONS: Primary | ICD-10-CM

## 2022-06-28 DIAGNOSIS — I10 ESSENTIAL HYPERTENSION: ICD-10-CM

## 2022-06-28 DIAGNOSIS — F32.0 MILD SINGLE CURRENT EPISODE OF MAJOR DEPRESSIVE DISORDER (HCC): ICD-10-CM

## 2022-06-28 PROBLEM — S82.853A: Status: RESOLVED | Noted: 2018-01-21 | Resolved: 2022-06-28

## 2022-06-28 PROBLEM — S82.892A CLOSED LEFT ANKLE FRACTURE, INITIAL ENCOUNTER: Status: RESOLVED | Noted: 2018-01-22 | Resolved: 2022-06-28

## 2022-06-28 PROBLEM — S93.06XA: Status: RESOLVED | Noted: 2018-01-21 | Resolved: 2022-06-28

## 2022-06-28 PROBLEM — S52.122A RADIAL HEAD FRACTURE, CLOSED, LEFT, CLOSED, INITIAL ENCOUNTER: Status: RESOLVED | Noted: 2018-01-21 | Resolved: 2022-06-28

## 2022-06-28 PROCEDURE — 1036F TOBACCO NON-USER: CPT | Performed by: NURSE PRACTITIONER

## 2022-06-28 PROCEDURE — 99214 OFFICE O/P EST MOD 30 MIN: CPT | Performed by: NURSE PRACTITIONER

## 2022-06-28 PROCEDURE — 3017F COLORECTAL CA SCREEN DOC REV: CPT | Performed by: NURSE PRACTITIONER

## 2022-06-28 PROCEDURE — G8417 CALC BMI ABV UP PARAM F/U: HCPCS | Performed by: NURSE PRACTITIONER

## 2022-06-28 PROCEDURE — 93000 ELECTROCARDIOGRAM COMPLETE: CPT | Performed by: NURSE PRACTITIONER

## 2022-06-28 PROCEDURE — G8427 DOCREV CUR MEDS BY ELIG CLIN: HCPCS | Performed by: NURSE PRACTITIONER

## 2022-06-28 PROCEDURE — 1123F ACP DISCUSS/DSCN MKR DOCD: CPT | Performed by: NURSE PRACTITIONER

## 2022-06-28 PROCEDURE — G8399 PT W/DXA RESULTS DOCUMENT: HCPCS | Performed by: NURSE PRACTITIONER

## 2022-06-28 PROCEDURE — 1090F PRES/ABSN URINE INCON ASSESS: CPT | Performed by: NURSE PRACTITIONER

## 2022-06-28 RX ORDER — LOSARTAN POTASSIUM 25 MG/1
25 TABLET ORAL DAILY
Qty: 90 TABLET | Refills: 1 | Status: SHIPPED | OUTPATIENT
Start: 2022-06-28 | End: 2022-08-09

## 2022-06-28 NOTE — PROGRESS NOTES
Pat Alvarez (:  1950) is a 80 y.o. female,Established patient, here for evaluation of the following chief complaint(s):  Follow-up      ASSESSMENT/PLAN:  1. Palpitations  Assessment & Plan:   EKG today in office was normal sinus rhythm. Unable to see rhythm However it did show the report of A. fib. Exam today consistent with normal rhythm as well. Given the fact that her at home EKG monitor has shown A. fib we will proceed with Zio patch for full evaluation. She has no other symptoms or concerns and is agreeable. This was placed today in the office. Orders:  -     EKG 12 Lead  -     Longterm Continuous Cardiac Event Monitor; Future  2. Mild single current episode of major depressive disorder [facility])  Assessment & Plan:   At goal, continue current medications and doing well since leaving her , currently not on medicaiton  3. Essential hypertension  Assessment & Plan:   Reports leg swelling from amlodipine, we will stop amlodipine and transition to losartan       No follow-ups on file. SUBJECTIVE/OBJECTIVE:  HPI  Patient in the office today to follow-up depression. She states that she has resolved left her  and since that time her mood and depression symptoms are much improved. She states that she has no concerns related to this. She is however concerned about her heart. She reports that she bought a 6-lead EKG for her phone, and on several occasions it has told her that she was in a heart rhythm called A. donaldo. She states she has no prior history of A. fib. She has a history of hypertension that is controlled on medications. She is currently on metoprolol for beta-blocker. Denies any shortness of breath or difficulty breathing. She does report bilateral leg and hand swelling that she attributes to the heat and the potential side effect from the amlodipine. She states that she was previously on lisinopril but switch the amlodipine because of the cough.   Current Outpatient Medications   Medication Sig Dispense Refill    losartan (COZAAR) 25 MG tablet Take 1 tablet by mouth daily 90 tablet 1    metoprolol succinate (TOPROL XL) 50 MG extended release tablet TAKE 1 TABLET EVERY DAY 90 tablet 1    lidocaine (XYLOCAINE) 5 % ointment Apply topically to the lips every 3 hours as needed. 10 g 0    omeprazole (PRILOSEC) 20 MG delayed release capsule TAKE 1 CAPSULE EVERY DAY 90 capsule 3    ramelteon (ROZEREM) 8 MG tablet Take 1 tablet by mouth nightly as needed for Sleep 30 tablet 5    triamterene-hydroCHLOROthiazide (MAXZIDE) 75-50 MG per tablet TAKE 1 TABLET EVERY DAY 90 tablet 3    valACYclovir (VALTREX) 1 g tablet TAKE 2 TABLETS AT  ONSET OF COLD SORE, REPEAT  12 HOURS LATER 36 tablet 3    Nutritional Supplements (VITAMIN D BOOSTER PO) Take 1 tablet by mouth daily      Misc Natural Products (TURMERIC CURCUMIN) CAPS Take by mouth      Multiple Vitamins-Minerals (HAIR SKIN AND NAILS FORMULA) TABS Take 1 tablet by mouth daily      aspirin 325 MG tablet Take 325 mg by mouth daily       No current facility-administered medications for this visit. Review of Systems    Vitals:    06/28/22 1006   BP: 124/82   Pulse: 70   Temp: 97.2 °F (36.2 °C)   SpO2: 99%   Weight: 165 lb (74.8 kg)       Physical Exam            An electronic signature was used to authenticate this note.     --Everette Baumgarten, OMARI - CNP

## 2022-06-28 NOTE — ASSESSMENT & PLAN NOTE
At goal, continue current medications and doing well since leaving her , currently not on medicaiton

## 2022-06-28 NOTE — ASSESSMENT & PLAN NOTE
EKG today in office was normal sinus rhythm. Unable to see where the from her However it did show the report of A. fib. Exam today consistent with normal rhythm as well. Given the fact that her at home EKG monitor has shown A. fib we will proceed with Zio patch for full evaluation. She has no other symptoms or concerns and is agreeable. This was placed today in the office.

## 2022-07-20 ENCOUNTER — APPOINTMENT (OUTPATIENT)
Dept: GENERAL RADIOLOGY | Age: 72
End: 2022-07-20
Payer: MEDICARE

## 2022-07-20 ENCOUNTER — OFFICE VISIT (OUTPATIENT)
Dept: FAMILY MEDICINE CLINIC | Age: 72
End: 2022-07-20
Payer: MEDICARE

## 2022-07-20 ENCOUNTER — HOSPITAL ENCOUNTER (EMERGENCY)
Age: 72
Discharge: HOME | End: 2022-07-20
Attending: STUDENT IN AN ORGANIZED HEALTH CARE EDUCATION/TRAINING PROGRAM
Payer: MEDICARE

## 2022-07-20 VITALS
BODY MASS INDEX: 27.49 KG/M2 | DIASTOLIC BLOOD PRESSURE: 69 MMHG | WEIGHT: 165 LBS | HEART RATE: 90 BPM | HEIGHT: 65 IN | OXYGEN SATURATION: 98 % | RESPIRATION RATE: 20 BRPM | SYSTOLIC BLOOD PRESSURE: 105 MMHG | TEMPERATURE: 98.4 F

## 2022-07-20 VITALS
TEMPERATURE: 96.6 F | SYSTOLIC BLOOD PRESSURE: 124 MMHG | HEIGHT: 65 IN | OXYGEN SATURATION: 97 % | BODY MASS INDEX: 27.49 KG/M2 | HEART RATE: 82 BPM | WEIGHT: 165 LBS | DIASTOLIC BLOOD PRESSURE: 84 MMHG

## 2022-07-20 DIAGNOSIS — I48.91 ATRIAL FIBRILLATION, UNSPECIFIED TYPE (HCC): Primary | ICD-10-CM

## 2022-07-20 DIAGNOSIS — R94.31 ABNORMAL ECG: ICD-10-CM

## 2022-07-20 DIAGNOSIS — R07.9 CHEST PAIN, UNSPECIFIED TYPE: ICD-10-CM

## 2022-07-20 DIAGNOSIS — I48.91 NEW ONSET A-FIB (HCC): ICD-10-CM

## 2022-07-20 DIAGNOSIS — I10 ESSENTIAL HYPERTENSION: ICD-10-CM

## 2022-07-20 DIAGNOSIS — R00.2 PALPITATIONS: ICD-10-CM

## 2022-07-20 DIAGNOSIS — I48.91 NEW ONSET A-FIB (HCC): Primary | ICD-10-CM

## 2022-07-20 DIAGNOSIS — I49.9 IRREGULAR HEART RHYTHM: Primary | ICD-10-CM

## 2022-07-20 LAB
ALBUMIN SERPL-MCNC: 4.6 G/DL (ref 3.4–5)
ALP BLD-CCNC: 94 U/L (ref 40–129)
ALT SERPL-CCNC: 19 U/L (ref 10–40)
AMORPHOUS: ABNORMAL /HPF
ANION GAP SERPL CALCULATED.3IONS-SCNC: 15 MMOL/L (ref 3–16)
AST SERPL-CCNC: 20 U/L (ref 15–37)
BACTERIA: ABNORMAL /HPF
BASOPHILS ABSOLUTE: 0 K/UL (ref 0–0.2)
BASOPHILS RELATIVE PERCENT: 0.4 %
BILIRUB SERPL-MCNC: 0.6 MG/DL (ref 0–1)
BILIRUBIN DIRECT: <0.2 MG/DL (ref 0–0.3)
BILIRUBIN URINE: NEGATIVE
BILIRUBIN, INDIRECT: NORMAL MG/DL (ref 0–1)
BLOOD, URINE: NEGATIVE
BUN BLDV-MCNC: 14 MG/DL (ref 7–20)
CALCIUM SERPL-MCNC: 9.9 MG/DL (ref 8.3–10.6)
CHLORIDE BLD-SCNC: 99 MMOL/L (ref 99–110)
CLARITY: CLEAR
CO2: 25 MMOL/L (ref 21–32)
COLOR: YELLOW
CREAT SERPL-MCNC: 1 MG/DL (ref 0.6–1.2)
EKG ATRIAL RATE: 81 BPM
EKG DIAGNOSIS: NORMAL
EKG DIAGNOSIS: NORMAL
EKG P AXIS: 35 DEGREES
EKG P-R INTERVAL: 174 MS
EKG Q-T INTERVAL: 316 MS
EKG Q-T INTERVAL: 356 MS
EKG QRS DURATION: 68 MS
EKG QRS DURATION: 80 MS
EKG QTC CALCULATION (BAZETT): 413 MS
EKG QTC CALCULATION (BAZETT): 413 MS
EKG R AXIS: -13 DEGREES
EKG R AXIS: 26 DEGREES
EKG T AXIS: 58 DEGREES
EKG T AXIS: 61 DEGREES
EKG VENTRICULAR RATE: 103 BPM
EKG VENTRICULAR RATE: 81 BPM
EOSINOPHILS ABSOLUTE: 0 K/UL (ref 0–0.6)
EOSINOPHILS RELATIVE PERCENT: 0.2 %
EPITHELIAL CELLS, UA: ABNORMAL /HPF (ref 0–5)
GFR AFRICAN AMERICAN: >60
GFR NON-AFRICAN AMERICAN: 55
GLUCOSE BLD-MCNC: 122 MG/DL (ref 70–99)
GLUCOSE URINE: NEGATIVE MG/DL
HCT VFR BLD CALC: 44 % (ref 36–48)
HEMOGLOBIN: 15.5 G/DL (ref 12–16)
INR BLD: 0.96 (ref 0.87–1.14)
KETONES, URINE: NEGATIVE MG/DL
LACTIC ACID: 1.2 MMOL/L (ref 0.4–2)
LEUKOCYTE ESTERASE, URINE: NEGATIVE
LYMPHOCYTES ABSOLUTE: 1.2 K/UL (ref 1–5.1)
LYMPHOCYTES RELATIVE PERCENT: 11.3 %
MCH RBC QN AUTO: 28.5 PG (ref 26–34)
MCHC RBC AUTO-ENTMCNC: 35.2 G/DL (ref 31–36)
MCV RBC AUTO: 81 FL (ref 80–100)
MICROSCOPIC EXAMINATION: ABNORMAL
MONOCYTES ABSOLUTE: 0.7 K/UL (ref 0–1.3)
MONOCYTES RELATIVE PERCENT: 6.5 %
NEUTROPHILS ABSOLUTE: 8.6 K/UL (ref 1.7–7.7)
NEUTROPHILS RELATIVE PERCENT: 81.6 %
NITRITE, URINE: NEGATIVE
PDW BLD-RTO: 14.5 % (ref 12.4–15.4)
PH UA: 7.5 (ref 5–8)
PLATELET # BLD: 356 K/UL (ref 135–450)
PMV BLD AUTO: 7.3 FL (ref 5–10.5)
POTASSIUM REFLEX MAGNESIUM: 3.6 MMOL/L (ref 3.5–5.1)
PRO-BNP: 596 PG/ML (ref 0–124)
PROTEIN UA: NEGATIVE MG/DL
PROTHROMBIN TIME: 12.7 SEC (ref 11.7–14.5)
RBC # BLD: 5.43 M/UL (ref 4–5.2)
RBC UA: ABNORMAL /HPF (ref 0–4)
SODIUM BLD-SCNC: 139 MMOL/L (ref 136–145)
SPECIFIC GRAVITY UA: 1.01 (ref 1–1.03)
TOTAL PROTEIN: 7.6 G/DL (ref 6.4–8.2)
TROPONIN: <0.01 NG/ML
TROPONIN: <0.01 NG/ML
URINE TYPE: ABNORMAL
UROBILINOGEN, URINE: 0.2 E.U./DL
WBC # BLD: 10.5 K/UL (ref 4–11)
WBC UA: ABNORMAL /HPF (ref 0–5)

## 2022-07-20 PROCEDURE — 36415 COLL VENOUS BLD VENIPUNCTURE: CPT

## 2022-07-20 PROCEDURE — 84484 ASSAY OF TROPONIN QUANT: CPT

## 2022-07-20 PROCEDURE — G8417 CALC BMI ABV UP PARAM F/U: HCPCS | Performed by: NURSE PRACTITIONER

## 2022-07-20 PROCEDURE — 96365 THER/PROPH/DIAG IV INF INIT: CPT

## 2022-07-20 PROCEDURE — 85025 COMPLETE CBC W/AUTO DIFF WBC: CPT

## 2022-07-20 PROCEDURE — 83880 ASSAY OF NATRIURETIC PEPTIDE: CPT

## 2022-07-20 PROCEDURE — 1036F TOBACCO NON-USER: CPT | Performed by: NURSE PRACTITIONER

## 2022-07-20 PROCEDURE — 80076 HEPATIC FUNCTION PANEL: CPT

## 2022-07-20 PROCEDURE — 85610 PROTHROMBIN TIME: CPT

## 2022-07-20 PROCEDURE — 93005 ELECTROCARDIOGRAM TRACING: CPT | Performed by: STUDENT IN AN ORGANIZED HEALTH CARE EDUCATION/TRAINING PROGRAM

## 2022-07-20 PROCEDURE — 6370000000 HC RX 637 (ALT 250 FOR IP): Performed by: STUDENT IN AN ORGANIZED HEALTH CARE EDUCATION/TRAINING PROGRAM

## 2022-07-20 PROCEDURE — 1123F ACP DISCUSS/DSCN MKR DOCD: CPT | Performed by: NURSE PRACTITIONER

## 2022-07-20 PROCEDURE — 81001 URINALYSIS AUTO W/SCOPE: CPT

## 2022-07-20 PROCEDURE — 87086 URINE CULTURE/COLONY COUNT: CPT

## 2022-07-20 PROCEDURE — 99285 EMERGENCY DEPT VISIT HI MDM: CPT

## 2022-07-20 PROCEDURE — G8428 CUR MEDS NOT DOCUMENT: HCPCS | Performed by: NURSE PRACTITIONER

## 2022-07-20 PROCEDURE — 2500000003 HC RX 250 WO HCPCS: Performed by: STUDENT IN AN ORGANIZED HEALTH CARE EDUCATION/TRAINING PROGRAM

## 2022-07-20 PROCEDURE — 84443 ASSAY THYROID STIM HORMONE: CPT

## 2022-07-20 PROCEDURE — 80048 BASIC METABOLIC PNL TOTAL CA: CPT

## 2022-07-20 PROCEDURE — 99204 OFFICE O/P NEW MOD 45 MIN: CPT | Performed by: INTERNAL MEDICINE

## 2022-07-20 PROCEDURE — 1090F PRES/ABSN URINE INCON ASSESS: CPT | Performed by: NURSE PRACTITIONER

## 2022-07-20 PROCEDURE — 71045 X-RAY EXAM CHEST 1 VIEW: CPT

## 2022-07-20 PROCEDURE — 96360 HYDRATION IV INFUSION INIT: CPT

## 2022-07-20 PROCEDURE — 93000 ELECTROCARDIOGRAM COMPLETE: CPT | Performed by: NURSE PRACTITIONER

## 2022-07-20 PROCEDURE — 99215 OFFICE O/P EST HI 40 MIN: CPT | Performed by: NURSE PRACTITIONER

## 2022-07-20 PROCEDURE — G8399 PT W/DXA RESULTS DOCUMENT: HCPCS | Performed by: NURSE PRACTITIONER

## 2022-07-20 PROCEDURE — 3017F COLORECTAL CA SCREEN DOC REV: CPT | Performed by: NURSE PRACTITIONER

## 2022-07-20 PROCEDURE — 83605 ASSAY OF LACTIC ACID: CPT

## 2022-07-20 PROCEDURE — 2580000003 HC RX 258: Performed by: STUDENT IN AN ORGANIZED HEALTH CARE EDUCATION/TRAINING PROGRAM

## 2022-07-20 RX ORDER — METOPROLOL SUCCINATE 50 MG/1
TABLET, EXTENDED RELEASE ORAL
Qty: 90 TABLET | Refills: 0 | Status: SHIPPED | OUTPATIENT
Start: 2022-07-20 | End: 2022-09-30

## 2022-07-20 RX ORDER — TRIAMTERENE AND HYDROCHLOROTHIAZIDE 75; 50 MG/1; MG/1
TABLET ORAL
Qty: 90 TABLET | Refills: 0 | Status: SHIPPED | OUTPATIENT
Start: 2022-07-20

## 2022-07-20 RX ORDER — DILTIAZEM HYDROCHLORIDE 5 MG/ML
10 INJECTION INTRAVENOUS ONCE
Status: COMPLETED | OUTPATIENT
Start: 2022-07-20 | End: 2022-07-20

## 2022-07-20 RX ORDER — 0.9 % SODIUM CHLORIDE 0.9 %
1000 INTRAVENOUS SOLUTION INTRAVENOUS ONCE
Status: COMPLETED | OUTPATIENT
Start: 2022-07-20 | End: 2022-07-20

## 2022-07-20 RX ADMIN — DILTIAZEM HYDROCHLORIDE 10 MG: 5 INJECTION INTRAVENOUS at 16:15

## 2022-07-20 RX ADMIN — DILTIAZEM HYDROCHLORIDE 5 MG/HR: 5 INJECTION INTRAVENOUS at 16:19

## 2022-07-20 RX ADMIN — SODIUM CHLORIDE 1000 ML: 0.9 INJECTION, SOLUTION INTRAVENOUS at 20:12

## 2022-07-20 RX ADMIN — APIXABAN 5 MG: 5 TABLET, FILM COATED ORAL at 17:08

## 2022-07-20 ASSESSMENT — ENCOUNTER SYMPTOMS
BLOOD IN STOOL: 0
SORE THROAT: 0
SINUS PRESSURE: 0
ABDOMINAL PAIN: 0
EYE REDNESS: 0
NAUSEA: 0
CHEST TIGHTNESS: 0
VOMITING: 0
WHEEZING: 0
RHINORRHEA: 0
COLOR CHANGE: 0
COUGH: 0
EYE ITCHING: 0
SHORTNESS OF BREATH: 0
DIARRHEA: 0
CONSTIPATION: 0
BACK PAIN: 0

## 2022-07-20 ASSESSMENT — PAIN - FUNCTIONAL ASSESSMENT
PAIN_FUNCTIONAL_ASSESSMENT: NONE - DENIES PAIN

## 2022-07-20 NOTE — CONSULTS
premature CAD      Medications:       Home Medications  Were reviewed and are listed in nursing record. and/or listed below  Prior to Admission medications    Medication Sig Start Date End Date Taking? Authorizing Provider   losartan (COZAAR) 25 MG tablet Take 1 tablet by mouth daily 6/28/22   OMARI Siegel CNP   metoprolol succinate (TOPROL XL) 50 MG extended release tablet TAKE 1 TABLET EVERY DAY 3/5/22   Kenneth Stone,    lidocaine (XYLOCAINE) 5 % ointment Apply topically to the lips every 3 hours as needed. Patient not taking: No sig reported 3/5/22   Kenneth Stone DO   omeprazole (PRILOSEC) 20 MG delayed release capsule TAKE 1 CAPSULE EVERY DAY  Patient not taking: Reported on 7/20/2022 3/5/22   Kenneth Stone DO   ramelteon (ROZEREM) 8 MG tablet Take 1 tablet by mouth nightly as needed for Sleep  Patient not taking: Reported on 7/20/2022 3/5/22 3/5/23  Kenneth Stone DO   triamterene-hydroCHLOROthiazide (MAXZIDE) 75-50 MG per tablet TAKE 1 TABLET EVERY DAY 3/5/22   Kenneth Stone DO   valACYclovir (VALTREX) 1 g tablet TAKE 2 TABLETS AT  ONSET OF COLD SORE, REPEAT  12 HOURS LATER 3/5/22   Kenneth Stone DO   Nutritional Supplements (VITAMIN D BOOSTER PO) Take 1 tablet by mouth daily    Historical Provider, MD   [facility] Products ([facility]) CAPS Take by mouth    Historical Provider, MD   Multiple Vitamins-Minerals (HAIR SKIN AND NAILS FORMULA) TABS Take 1 tablet by mouth daily    Historical Provider, MD   aspirin 325 MG tablet Take 325 mg by mouth daily    Historical Provider, MD          Inpatient Medications:      IV drips:   [id number]         PRN:      Allergy:     Lisinopril       Review of Systems:     All 12 point review of symptoms completed. Pertinent positives identified in the HPI, all other review of symptoms negative as below. CONSTITUTIONAL: No fatigue  SKIN: No rash or pruritis.   EYES: No visual changes or Abdomen:   Soft, non-tender, bowel sounds active all four quadrants,  no masses, no organomegaly       Extremities: Extremities normal, atraumatic, no cyanosis. Pulses: 2+ and symmetric   Skin: Skin color, texture, turgor normal, no rashes or lesions   Pysch: Normal mood and affect   Neurologic: Normal gross motor and sensory exam.  Cranial nerves intact        Labs:     Recent Labs     07/20/22  1312      K 3.6   BUN 14   CREATININE 1.0   CL 99   CO2 25   GLUCOSE 122*   CALCIUM 9.9     Recent Labs     07/20/22  1312   WBC 10.5   HGB 15.5   HCT 44.0      MCV 81.0     No results for input(s): CHOLTOT, TRIG, HDL, CHOLHDL, LDL in the last 72 hours. Invalid input(s): [employer], [id number]  Recent Labs     07/20/22  1312   INR 0.96     Recent Labs     07/20/22  1312   TROPONINI <0.01     No results for input(s): BNP in the last 72 hours. No results for input(s): TSH in the last 72 hours. No results for input(s): CHOL, HDL, LDLCALC, TRIG in the last 72 hours.]    Lab Results   Component Value Date    TROPONINI <0.01 07/20/2022         Imaging:       I have personally reviewed patient's ECG which shows atrial fibrillation with RVR, nonspecific ST-T wave changes    Assessment / Plan:   1. Atrial fibrillation  2. Chest pain  3. Hypertension    Only back in rhythm. Feels back to normal.  Initial cardiac biomarkers within normal limits. Recommend increasing her Toprol to 50 mg p.o. twice daily. Anticoagulation with Eliquis 5 mg p.o. twice daily. Will need echocardiogram and exercise Myoview to exclude ischemia. She will follow-up with me as an outpatient in 1 to 2 weeks. I have personally reviewed the reports and images of labs, radiological studies, cardiac studies including ECG's and telemetry, current and old medical records. The note was completed using EMR and Dragon dictation system.  Every effort was made to ensure accuracy; however, inadvertent computerized transcription errors may be present. All questions and concerns were addressed to the patient/family. Alternatives to my treatment were discussed. I would like to thank you for providing me the opportunity to participate in the care of your patient. If you have any questions, please do not hesitate to contact me.      Mariam Farias MD, [facility],   [address] [place] [id number]  Ph: [phone]  Fax: [phone]

## 2022-07-20 NOTE — PROGRESS NOTES
Grecia Gilmore (:  1950) is a 80 y.o. female,Established patient, here for evaluation of the following chief complaint(s): Other (dizzy)      ASSESSMENT/PLAN:  1. Irregular heart rhythm  Assessment & Plan:   ekg in office shows A. fib with ST changes from last EKG. At this point patient needs to be seen in the emergency room for further evaluation and intervention planning. I advised Valentina Naqvi that squad will be called to take her for emergency intervention she refused the squad and would like to drive herself. Report was called to the charge nurse at the emergency room to alert her of her arrival.  Orders:  -     EKG 12 Lead  2. New onset a-fib [facility])  Assessment & Plan:   ekg in office shows A. fib with ST changes from last EKG. At this point patient needs to be seen in the emergency room for further evaluation and intervention planning. I advised Valentina Naqvi that squad will be called to take her for emergency intervention she refused the squad and would like to drive herself. Report was called to the charge nurse at the emergency room to alert her of her arrival.  3. Palpitations  Assessment & Plan:  Zio patch results were reviewed and was received yesterday and showed no A. fib or other atypical rhythms with the exception of an occasional PVC. No follow-ups on file. SUBJECTIVE/OBJECTIVE:    Patient is in the office of walk-in with reports of dizziness. A few weeks ago she was seen in the office for concerns of A. fib that she had from an EKG itzel on her phone. In the office her heart sounded regular and the EKG was normal.  We did place a Zio patch on her and the results came back yesterday. The results showed no A. fib and was relatively normal with the exception of a few PVCs. Today she was shopping with her grandkids and just was not feeling well so she went to lie down when she woke up still did not feel well so she stopped into the office.   She states she has not taken anything for the symptoms and other than the dizziness is feeling pretty well overall. Denies any chest pain or shortness of breath does have a little bit of palpitations. Current Outpatient Medications   Medication Sig Dispense Refill    losartan (COZAAR) 25 MG tablet Take 1 tablet by mouth daily 90 tablet 1    metoprolol succinate (TOPROL XL) 50 MG extended release tablet TAKE 1 TABLET EVERY DAY 90 tablet 1    omeprazole (PRILOSEC) 20 MG delayed release capsule TAKE 1 CAPSULE EVERY DAY 90 capsule 3    triamterene-hydroCHLOROthiazide (MAXZIDE) 75-50 MG per tablet TAKE 1 TABLET EVERY DAY 90 tablet 3    valACYclovir (VALTREX) 1 g tablet TAKE 2 TABLETS AT  ONSET OF COLD SORE, REPEAT  12 HOURS LATER 36 tablet 3    Nutritional Supplements (VITAMIN D BOOSTER PO) Take 1 tablet by mouth daily      Misc Natural Products (TURMERIC CURCUMIN) CAPS Take by mouth      Multiple Vitamins-Minerals (HAIR SKIN AND NAILS FORMULA) TABS Take 1 tablet by mouth daily      aspirin 325 MG tablet Take 325 mg by mouth daily      lidocaine (XYLOCAINE) 5 % ointment Apply topically to the lips every 3 hours as needed. (Patient not taking: Reported on 7/20/2022) 10 g 0    ramelteon (ROZEREM) 8 MG tablet Take 1 tablet by mouth nightly as needed for Sleep 30 tablet 5     No current facility-administered medications for this visit. Review of Systems   Constitutional:  Negative for chills, fatigue and fever. HENT:  Negative for congestion, ear pain, postnasal drip, rhinorrhea, sinus pressure, sneezing and sore throat. Eyes:  Negative for redness and itching. Respiratory:  Negative for cough, chest tightness, shortness of breath and wheezing. Cardiovascular:  Negative for chest pain and palpitations. Gastrointestinal:  Negative for abdominal pain, blood in stool, constipation, diarrhea, nausea and vomiting. Endocrine: Negative for cold intolerance and heat intolerance.    Genitourinary:  Negative for difficulty urinating, dysuria, flank pain, frequency, hematuria and urgency. Musculoskeletal:  Negative for arthralgias, back pain, joint swelling and myalgias. Skin:  Negative for color change, pallor, rash and wound. Allergic/Immunologic: Negative for environmental allergies and food allergies. Neurological:  Positive for dizziness. Negative for seizures, syncope, weakness, light-headedness, numbness and headaches. Hematological:  Negative for adenopathy. Does not bruise/bleed easily. Psychiatric/Behavioral:  Negative for confusion, sleep disturbance and suicidal ideas. The patient is not nervous/anxious and is not hyperactive. Vitals:    07/20/22 1109   BP: 124/84   Site: Right Upper Arm   Position: Sitting   Cuff Size: Medium Adult   Pulse: 82   Temp: (!) 96.6 °F (35.9 °C)   SpO2: 97%   Weight: 165 lb (74.8 kg)   Height: 5' 5\" (1.651 m)       Physical Exam  Constitutional:       Appearance: Normal appearance. She is well-developed. HENT:      Head: Normocephalic and atraumatic. Right Ear: Hearing normal.      Left Ear: Hearing normal.      Nose: No mucosal edema. Right Sinus: No maxillary sinus tenderness or frontal sinus tenderness. Left Sinus: No maxillary sinus tenderness or frontal sinus tenderness. Mouth/Throat: Tonsils: No tonsillar abscesses. Eyes:      Extraocular Movements: Extraocular movements intact. Pupils: Pupils are equal, round, and reactive to light. Cardiovascular:      Rate and Rhythm: Normal rate. Rhythm irregularly irregular. Pulses: Normal pulses. Heart sounds: Normal heart sounds. Pulmonary:      Effort: Pulmonary effort is normal.      Breath sounds: Normal breath sounds. Lymphadenopathy:      Head:      Right side of head: No submental, submandibular, tonsillar, preauricular, posterior auricular or occipital adenopathy. Left side of head: No submental, submandibular, tonsillar, preauricular, posterior auricular or occipital adenopathy.    Skin:     General: Skin is warm and dry. Neurological:      Mental Status: She is alert. Psychiatric:         Mood and Affect: Mood normal.         Behavior: Behavior normal.         On this date 7/20/2022 I have spent 45 minutes reviewing previous notes, test results and face to face with the patient discussing the diagnosis and importance of compliance with the treatment plan as well as documenting on the day of the visit. An electronic signature was used to authenticate this note.     --OMARI Amador - CNP

## 2022-07-20 NOTE — ASSESSMENT & PLAN NOTE
Zio patch results were reviewed and was received yesterday and showed no A. fib or other atypical rhythms with the exception of an occasional PVC.

## 2022-07-20 NOTE — ED PROVIDER NOTES
[address] [facility] ENCOUNTER          ATTENDING PHYSICIAN NOTE       Date of evaluation: 7/20/2022    Chief Complaint     Palpitations (Pt states for the past year she has intermittently felt \"a flutter like feeling in my chest.\" Denies chest pain or shortness of breath. States she recently had Holter monitor for a couple weeks but states she didn't feel any of those episodes and it did not show anything. Bought a home EKG monitor and took one today, after feeling lightheaded in the shower, that indicated Afib. Patient saw her Dr and was sent to ER.)      History of Present Illness     Sintia Mir is a 80 y.o. female who presents with palpitations    Patient presents with palpitations. She states that she has had intermittent palpitations for several months. She actually just completed a Zio patch, with the patch coming off 6 days ago, where she had no episodes for an entire month. Beginning yesterday in the middle of the day she had some fatigue. In the evening she developed some chest pain. This is described as painful flip-flopping in quality, has been continuous in duration, has waxed and waned in course, it is mild in severity. The pain is substernal.  She does not have any significant associated shortness of breath. It is improved over the last hour or 2. Last night she put on a blood pressure cuff and found that she was in A. fib according to it. This morning it was again saying she was in A. fib. She went to her doctor and was seen by Ms. Jennings who diagnosed A. fib on an outpatient EKG. Patient is not anticoagulated. She denies any recent sick symptoms such as fevers, nausea, vomiting, cough. She denies change in bowel bladder habits. PMHx: HTN, hysterectomy, tinnitus, and as below  SH: former smoker, occ alcohol, and as below    Review of Systems       ROS:   Positive f  as per HPI.   Negative for:    -Constitutional: fevers, chills    -Eyes:   eye pain, eye discharge    -Ears/Nose/Throat: Ear pain, ear discharge    -Cardiovascular: cyanosis    -Respiratory:  cough, SOB    -Gastrointestinal: Abd pain, nausea, vomiting, melena, hematochezia    -Genitourinary: hematuria, dysuria, urinary frequency    -Neurological: numbness or weakness    -Skin:   Rash, pruritis,     -Hematologic: easy bleeding, easy bruising    -Musculoskeletal:  joint swelling, joint redness    Past Medical, Surgical, Family, and Social History     She has a past medical history of Anxiety, Herpes, Hypertension, Insomnia, Radial head fracture, closed, left, closed, initial encounter, and Tinnitus. She has a past surgical history that includes Hysterectomy; Breast biopsy; Shoulder arthroscopy (Left); Elbow surgery; Gerber tooth extraction; Hysterectomy, total abdominal; Ankle surgery (Left, 01/21/2018); and Colonoscopy (N/A, 12/17/2021). Her family history includes Arthritis in her father, maternal aunt, and mother; Cancer in her father; Heart Disease in her father, mother, and sister; High Blood Pressure in her brother, brother, and father. She reports that she quit smoking about 31 years ago. Her smoking use included cigarettes. She has a 15.00 pack-year smoking history. She has never used smokeless tobacco. She reports current alcohol use. She reports that she does not use drugs. Medications     Previous Medications    ASPIRIN 325 MG TABLET    Take 325 mg by mouth daily    LIDOCAINE (XYLOCAINE) 5 % OINTMENT    Apply topically to the lips every 3 hours as needed.     LOSARTAN (COZAAR) 25 MG TABLET    Take 1 tablet by mouth daily    MISC NATURAL PRODUCTS (TURMERIC CURCUMIN) CAPS    Take by mouth    MULTIPLE VITAMINS-MINERALS (HAIR SKIN AND NAILS FORMULA) TABS    Take 1 tablet by mouth daily    NUTRITIONAL SUPPLEMENTS (VITAMIN D BOOSTER PO)    Take 1 tablet by mouth daily    OMEPRAZOLE (PRILOSEC) 20 MG DELAYED RELEASE CAPSULE    TAKE 1 CAPSULE EVERY DAY    RAMELTEON (ROZEREM) 8 MG TABLET    Take 1 tablet by mouth nightly as needed for Sleep    VALACYCLOVIR (VALTREX) 1 G TABLET    TAKE 2 TABLETS AT  ONSET OF COLD SORE, REPEAT  12 HOURS LATER       Allergies     She is allergic to lisinopril. Physical Exam     INITIAL VITALS: BP: (!) 140/77, Temp: 98.4 °F (36.9 °C), Heart Rate: 87, Resp: 18, SpO2: 98 %     General:  Well appearing. No acute distress. Non-toxic appearing    Eyes:  Pupils equally round, reactive, brisk. No discharge from eyes. ENT:  No discharge from nose. OP clear. Neck:  Supple. Nontender. Pulmonary:   Non-labored breathing. Breath sounds clear bilaterally. Cardiac:  irreg irreg rhythm, tachycardic rate. No murmurs. Abdomen:  Soft. Non-tender. Non-distended. No masses. Musculoskeletal:  No long bone deformity. No ankle or wrist deformity. Vascular:  Extremities warm and perfused. Radial pulses 2+ bilaterally. Skin:  No rash. Warm. Neuro: Alert and conversant. CN II-XII grossly intact. Speech and mentation normal.    SUDARSHAN  Sensation grossly intact to light touch. Extremities:  No peripheral edema. LE symmetric. Diagnostic Results     EKG   Indication palpitations     EKG Interpretation     Interpreted by me (emergency department physician)     Rhythm: AFib with RVR   Rate: 103  Axis: normal (26)  Ectopy: none  Conduction: normal, with QTc 413  ST Segments: no acute change  T Waves: no acute change  Q Waves: nonspecific pattern     Clinical Impression:   AFib With RVR  This is a non-specific EKG with onset of AFib compared to the prior EKG dated 6/28/22  There is no evidence of significant interval prolongation. There is no evidence of acute ischemia.   BOB TORRES    Indication palpitations     EKG Interpretation     Interpreted by me (emergency department physician)     Rhythm: normal sinus   Rate: 81  Axis: normal (-13)  Ectopy: none  Conduction: normal  ST Segments: no acute change  T Waves: no acute change  Q Waves: nonspecific pattern     Clinical Impression:   Normal sinus rhythm. This is a non-specific EKG with re-established sinus rhythm compared to the prior EKG dated earlier today. There is no evidence of significant interval prolongation. There is no evidence of acute ischemia.   BOB TORRES      RADIOLOGY:  XR CHEST PORTABLE   Final Result      No acute disease          LABS:   Results for orders placed or performed during the hospital encounter of 07/20/22   CBC with Auto Differential   Result Value Ref Range    WBC 10.5 4.0 - 11.0 K/uL    RBC 5.43 (H) 4.00 - 5.20 M/uL    Hemoglobin 15.5 12.0 - 16.0 g/dL    Hematocrit 44.0 36.0 - 48.0 %    MCV 81.0 80.0 - 100.0 fL    MCH 28.5 26.0 - 34.0 pg    MCHC 35.2 31.0 - 36.0 g/dL    RDW 14.5 12.4 - 15.4 %    Platelets 060 893 - 143 K/uL    MPV 7.3 5.0 - 10.5 fL    Neutrophils % 81.6 %    Lymphocytes % 11.3 %    Monocytes % 6.5 %    Eosinophils % 0.2 %    Basophils % 0.4 %    Neutrophils Absolute 8.6 (H) 1.7 - 7.7 K/uL    Lymphocytes Absolute 1.2 1.0 - 5.1 K/uL    Monocytes Absolute 0.7 0.0 - 1.3 K/uL    Eosinophils Absolute 0.0 0.0 - 0.6 K/uL    Basophils Absolute 0.0 0.0 - 0.2 K/uL   Basic Metabolic Panel w/ Reflex to MG   Result Value Ref Range    Sodium 139 136 - 145 mmol/L    Potassium reflex Magnesium 3.6 3.5 - 5.1 mmol/L    Chloride 99 99 - 110 mmol/L    CO2 25 21 - 32 mmol/L    Anion Gap 15 3 - 16    Glucose 122 (H) 70 - 99 mg/dL    BUN 14 7 - 20 mg/dL    Creatinine 1.0 0.6 - 1.2 mg/dL    GFR Non-African American 55 (A) >60    GFR African American >60 >60    Calcium 9.9 8.3 - 10.6 mg/dL   Hepatic Function Panel   Result Value Ref Range    Total Protein 7.6 6.4 - 8.2 g/dL    Albumin 4.6 3.4 - 5.0 g/dL    Alkaline Phosphatase 94 40 - 129 U/L    ALT 19 10 - 40 U/L    AST 20 15 - 37 U/L    Total Bilirubin 0.6 0.0 - 1.0 mg/dL    Bilirubin, Direct <0.2 0.0 - 0.3 mg/dL    Bilirubin, Indirect see below 0.0 - 1.0 mg/dL   Troponin   Result Value Ref Range    Troponin <0.01 <0.01 ng/mL   Brain Natriuretic Peptide   Result Value Ref Range    Pro- (H) 0 - 124 pg/mL   Protime-INR   Result Value Ref Range    Protime 12.7 11.7 - 14.5 sec    INR 0.96 0.87 - 1.14   Urinalysis with Microscopic   Result Value Ref Range    Color, UA Yellow Straw/Yellow    Clarity, UA Clear Clear    Glucose, Ur Negative Negative mg/dL    Bilirubin Urine Negative Negative    Ketones, Urine Negative Negative mg/dL    Specific Gravity, UA 1.015 1.005 - 1.030    Blood, Urine Negative Negative    pH, UA 7.5 5.0 - 8.0    Protein, UA Negative Negative mg/dL    Urobilinogen, Urine 0.2 <2.0 E.U./dL    Nitrite, Urine Negative Negative    Leukocyte Esterase, Urine Negative Negative    Microscopic Examination Not Indicated     Urine Type NotGiven     WBC, UA 0-2 0 - 5 /HPF    RBC, UA 0-2 0 - 4 /HPF    Epithelial Cells, UA 2-5 0 - 5 /HPF    Bacteria, UA Rare (A) None Seen /HPF    Amorphous, UA 2+ /HPF   Lactic Acid   Result Value Ref Range    Lactic Acid 1.2 0.4 - 2.0 mmol/L   EKG 12 Lead   Result Value Ref Range    Ventricular Rate 103 BPM    QRS Duration 68 ms    Q-T Interval 316 ms    QTc Calculation (Bazett) 413 ms    R Axis 26 degrees    T Axis 58 degrees    Diagnosis       EKG performed in ER and to be interpreted by ER physician. Confirmed by MD, ER ([id number]),  Milagro Carrion ([id number]) on 7/20/2022 1:09:58 PM       ED BEDSIDE ULTRASOUND:  None performed    Procedures     None performed    ED Course     Nursing Notes, Past Medical Hx, Past Surgical Hx, Social Hx, Allergies, and Family Hx were reviewed. The patient was given the following medications:  Orders Placed This Encounter   Medications    FOLLOWED BY Linked Order Group     dilTIAZem injection 10 mg     dilTIAZem 125 mg in dextrose 5 % 125 mL infusion      Order Specific Question:   Titrate Infusion? Answer:   No      Order Specific Question:   Infusion Dose:       Answer:   5 mg/hr    apixaban (ELIQUIS) tablet 5 mg     Order Specific Question:   Indication of Use     Answer:   A Fib/A Flutter    triamterene-hydroCHLOROthiazide (MAXZIDE) 75-50 MG per tablet     Sig: TAKE 0.5 TABLET EVERY DAY     Dispense:  90 tablet     Refill:  0    metoprolol succinate (TOPROL XL) 50 MG extended release tablet     Sig: TAKE 1 TABLET TWICE A DAY     Dispense:  90 tablet     Refill:  0    apixaban (ELIQUIS) 5 MG TABS tablet     Sig: Take 1 tablet by mouth in the morning and 1 tablet before bedtime. Dispense:  60 tablet     Refill:  0       CONSULTS:  IP CONSULT TO CARDIOLOGY  IP CONSULT TO HOSPITALIST    MEDICAL DECISIONMAKING / ASSESSMENT / Maxine Kenyatta is a 80 y.o. female with palpitations found to be in AFib with RVR. Pt was hemodynamically stable and afebrile in the Emergency Department. This patient presents with what is likely paroxysmal A. fib. She is not a good candidate for cardioversion for this reason. I did consult cardiology given her well appearance. They were able to identify a safe outpatient plan because by the time of their consultation, the patient had spontaneously converted. She was given 10 mg of IV diltiazem subsequently. She was also given 5 mg of Eliquis. With the recommendations of cardiology for an outpatient plan, the patient is comfortable with this. Her heart rate is in the 80s. She will be started on Eliquis and increase her dose of Toprol and decrease her dose of triamterene hydrochlorothiazide. These are all recommendations cardiology. I did review contraindications to anticoagulation, and the patient has had no concerning factors such as lumbar puncture, prior head bleed, or history of GI bleeds. The patient was counseled about the risk for head bleed after trauma as well as GI bleeds from anticoagulation. Search for contributing trigger to her episode of A. fib is without significant findings. In particular  CBC with no leukocytosis, making systemic infection somewhat less likely. There is no significant new anemia.        Basic metabolic panel without evidence of acute kidney injury or significant electrolyte derangement. Hepatic function panel unremarkable making acute hepatitis or biliary pathology less likely. Urinalysis unremarkable and free of evidence of acute urinary tract infection. Troponin not elevated making ACS less likely, including on repeat. This is also negative on repeat. The BNP is elevated to 596, but her remaining exam is not consistent with this. INR normal     Lactate not elevated making systemic infection and hypoperfusion somewhat less likely. Chest Xray without pneumonia or pneumothorax. Repeat EKG confirms in NSR. Patient is given her first dose of Eliquis here. She is counseled regarding other medication changes. Patient is able to ambulate without any symptoms. She stands and has a normal blood pressure with this. She has no chest pain. She will follow-up with cardiology. Clinical Impression     1. Atrial fibrillation, unspecified type ([facility] [address]    2. Essential hypertension           Disposition     PATIENT REFERRED TO:  OMARI Watts - CNP  [facility] [address] [place] [address]  [phone]    Schedule an appointment as soon as possible for a visit in 2 days  Discuss your ED visit, and referrals/medication    DISCHARGE MEDICATIONS:  New Prescriptions    APIXABAN (ELIQUIS) 5 MG TABS TABLET    Take 1 tablet by mouth in the morning and 1 tablet before bedtime.        DISPOSITION  07/20/2022 04:01:32 PM         Selwyn Childress MD  07/20/22 0292

## 2022-07-20 NOTE — DISCHARGE INSTRUCTIONS
We saw you in the hospital for palpitations. Tests showed atrial fibrillation, which then turned into normal sinus rhythm. Chest xray was normal.    You were treated with eliquis and diltiazem. Reduce the dose of the triamterene-hydrochlorothiazide, by taking only 1/2 of a tablet. Starting TOMORROW, increase the dose of your toprol XL, taking it twice a day. Begin taking eliquis with the first dose TOMORROW. Take this medication twice a day. You need to see the Cardiologist in 1-3 days to be checked and complete additional testing. You should return to the emergency department if your symptoms worsen or do not resolve. In addition, return if:  - you have blood in your stool, dark tarry stool, you vomit blood, or you fall and hit your head or suffer any other trauma. - you have chest pain, or worsening palpitations, or cannot do activities that you normally do. - You have a fever (greater than 101 degrees)  - You have chest pain, shortness of breath, abdominal pain, or uncontrollable vomiting  - You are unable to eat or drink  - You pass out  - You have difficulty moving your arms or legs   - You have difficulty speaking or slurred speech  - Or you have any concern that you feel needs acute physician evaluation.

## 2022-07-20 NOTE — ASSESSMENT & PLAN NOTE
ekg in office shows A. fib with ST changes from last EKG. At this point patient needs to be seen in the emergency room for further evaluation and intervention planning. I advised Chriss Rabago that squad will be called to take her for emergency intervention she refused the squad and would like to drive herself.   Report was called to the charge nurse at the emergency room to alert her of her arrival.

## 2022-07-21 ENCOUNTER — TELEPHONE (OUTPATIENT)
Dept: CARDIOLOGY CLINIC | Age: 72
End: 2022-07-21

## 2022-07-21 LAB
TSH REFLEX: 1.48 UIU/ML (ref 0.27–4.2)
URINE CULTURE, ROUTINE: NORMAL

## 2022-07-22 ENCOUNTER — TELEPHONE (OUTPATIENT)
Dept: CARDIOLOGY CLINIC | Age: 72
End: 2022-07-22

## 2022-07-22 NOTE — TELEPHONE ENCOUNTER
Spoke w/pt at length; aware of follow up OV 8/9, echo 7/25 and myoview 7/28. Verb understanding of all.

## 2022-07-25 ENCOUNTER — PROCEDURE VISIT (OUTPATIENT)
Dept: CARDIOLOGY CLINIC | Age: 72
End: 2022-07-25
Payer: MEDICARE

## 2022-07-25 DIAGNOSIS — R00.2 PALPITATIONS: ICD-10-CM

## 2022-07-25 DIAGNOSIS — R07.9 CHEST PAIN, UNSPECIFIED TYPE: ICD-10-CM

## 2022-07-25 DIAGNOSIS — I48.91 NEW ONSET A-FIB (HCC): ICD-10-CM

## 2022-07-25 DIAGNOSIS — I10 ESSENTIAL HYPERTENSION: ICD-10-CM

## 2022-07-25 LAB
LV EF: 63 %
LVEF MODALITY: NORMAL

## 2022-07-25 PROCEDURE — 93306 TTE W/DOPPLER COMPLETE: CPT | Performed by: INTERNAL MEDICINE

## 2022-07-28 ENCOUNTER — HOSPITAL ENCOUNTER (OUTPATIENT)
Dept: NON INVASIVE DIAGNOSTICS | Age: 72
Discharge: HOME | End: 2022-07-28
Payer: MEDICARE

## 2022-07-28 DIAGNOSIS — R94.31 ABNORMAL ECG: ICD-10-CM

## 2022-07-28 DIAGNOSIS — R07.9 CHEST PAIN, UNSPECIFIED TYPE: ICD-10-CM

## 2022-07-28 DIAGNOSIS — R00.2 PALPITATIONS: ICD-10-CM

## 2022-07-28 DIAGNOSIS — I10 ESSENTIAL HYPERTENSION: ICD-10-CM

## 2022-07-28 DIAGNOSIS — I48.91 NEW ONSET A-FIB (HCC): ICD-10-CM

## 2022-07-28 LAB
LV EF: 86 %
LVEF MODALITY: NORMAL

## 2022-07-28 PROCEDURE — 3430000000 HC RX DIAGNOSTIC RADIOPHARMACEUTICAL: Performed by: STUDENT IN AN ORGANIZED HEALTH CARE EDUCATION/TRAINING PROGRAM

## 2022-07-28 PROCEDURE — 78452 HT MUSCLE IMAGE SPECT MULT: CPT

## 2022-07-28 PROCEDURE — 93017 CV STRESS TEST TRACING ONLY: CPT

## 2022-07-28 PROCEDURE — A9502 TC99M TETROFOSMIN: HCPCS | Performed by: STUDENT IN AN ORGANIZED HEALTH CARE EDUCATION/TRAINING PROGRAM

## 2022-07-28 RX ADMIN — TETROFOSMIN 30.1 MILLICURIE: 1.38 INJECTION, POWDER, LYOPHILIZED, FOR SOLUTION INTRAVENOUS at 12:36

## 2022-07-28 RX ADMIN — TETROFOSMIN 10 MILLICURIE: 1.38 INJECTION, POWDER, LYOPHILIZED, FOR SOLUTION INTRAVENOUS at 11:26

## 2022-08-01 NOTE — PROGRESS NOTES
[facility]     Outpatient Cardiology         Patient Name:  Waleska Morrissey  Requesting Physician: OMARI Judd CNP  Primary Care Physician: OMARI Judd CNP    Reason for Consultation/Chief Complaint:   Chief Complaint   Patient presents with    Follow-up     F/u from hospital consult. Had echo and stress myoview         HPI:     80 y.o. female with hypertension referred for atrial fibrillation with RVR. She has been having episodes of intermittent palpitations for quite some time with an unremarkable Holter. She presented to her primary care office with complaints of palpitations and dizziness. EKG confirmed A. fib with RVR. On my evaluation in the ED the patient was back in sinus rhythm. She did have some transient chest pressure earlier this morning but this has resolved as well. Reports extensive family history of cardiac problems. Denies any history of bleeding. She has had extensive history of lower extremity edema, which she attributed to amlodipine. Resolved since starting diuretic and discontinue medication. Denies orthopnea, PND or weight gain    Now presents for follow up after obtaining echo and stress test, results noted below    H/o cough with lisinopril    Histories:     Past Medical History:   has a past medical history of Anxiety, Bilateral leg edema, Chest pressure, Dizziness, Herpes, Hypertension, Insomnia, New onset a-fib (HCC), Palpitations, Radial head fracture, closed, left, closed, initial encounter, and Tinnitus. Surgical History:   has a past surgical history that includes Hysterectomy; Breast biopsy; Shoulder arthroscopy (Left); Elbow surgery; [place] tooth extraction; Hysterectomy, total abdominal; Ankle surgery (Left, 01/21/2018); and Colonoscopy (N/A, 12/17/2021). Social History:   reports that she quit smoking about 31 years ago. Her smoking use included cigarettes.  She has a 15.00 pack-year smoking history. She has never used smokeless tobacco. She reports current alcohol use. She reports that she does not use drugs. Family History:  No evidence for sudden cardiac death or premature CAD    Medications:     Home Medications:  Were reviewed and are listed in nursing record. and/or listed below    Prior to Admission medications    Medication Sig Start Date End Date Taking? Authorizing Provider   clotrimazole-betamethasone (LOTRISONE) 1-0.05 % cream  7/12/22  Yes Historical Provider, MD   ketoconazole (NIZORAL) 2 % cream  7/12/22  Yes Historical Provider, MD   alclomethasone (ACLOVATE) 0.05 % cream Apply topically 2 times daily Apply topically 2 times daily. Yes Historical Provider, MD   losartan (COZAAR) 50 MG tablet Take 1 tablet by mouth in the morning. 8/9/22  Yes Gio Lemus MD   triamterene-hydroCHLOROthiazide (MAXZIDE) 75-50 MG per tablet TAKE 0.5 TABLET EVERY DAY 7/20/22  Yes Crosby Leventhal, MD   metoprolol succinate (TOPROL XL) 50 MG extended release tablet TAKE 1 TABLET TWICE A DAY 7/20/22  Yes Crosby Leventhal, MD   apixaban (ELIQUIS) 5 MG TABS tablet Take 1 tablet by mouth in the morning and 1 tablet before bedtime. 7/20/22  Yes Crosby Leventhal, MD   valACYclovir (VALTREX) 1 g tablet TAKE 2 TABLETS AT  ONSET OF COLD SORE, REPEAT  12 HOURS LATER  Patient taking differently: as needed TAKE 2 TABLETS AT  ONSET OF COLD SORE, REPEAT  12 HOURS LATER 3/5/22  Yes Susanne Stone DO        Allergy:     Lisinopril     Review of Systems:     Review of Systems   Constitutional:  Negative for chills and fever. Respiratory:          See HPI   Cardiovascular:         See HPI. Gastrointestinal:  Negative for abdominal pain and vomiting. Endocrine: Negative. Genitourinary: Negative. Musculoskeletal: Negative. Skin: Negative. Neurological:  Negative for dizziness and syncope. Hematological:  Does not bruise/bleed easily. Psychiatric/Behavioral: Negative.      All other systems reviewed LDLCALC 106 (H) 12/22/2020    LDLCALC 107 (H) 03/02/2019     Lab Results   Component Value Date    LABVLDL 32 05/26/2022    LABVLDL 29 12/22/2020    LABVLDL 43 03/02/2019     No results found for: [facility]    Lab Results   Component Value Date    INR 0.96 07/20/2022    INR 1.04 01/20/2018    PROTIME 12.7 07/20/2022    PROTIME 11.8 01/20/2018       The 10-year ASCVD risk score (Ernestina Díaz, et al., 2013) is: 9.1%    Values used to calculate the score:      Age: 80 years      Sex: Female      Is Non- : No      Diabetic: No      Tobacco smoker: No      Systolic Blood Pressure: 818 mmHg      Is BP treated: Yes      HDL Cholesterol: 57 mg/dL      Total Cholesterol: 183 mg/dL      Imaging:       ECG (if available, Personally interpreted):    Last Monitor/Holter (if available):  ZIO Patch   6/28 -7/12/2022  Minimum HR of 41 BPM, maximum HR of 123 BPM, average HR of 67 BPM. Predominant underlying rhythm was SR. Isolated SVEs were rare (<1.0%), SVE Couplets were rare (<1.0%), and no SVE Triplets were present. Isolated VEs were rare (<1.0%), and no VE Couplets or VE Triplets were present. Ventricular Bigeminy and Trigeminy were present. Last Stress (if available): 7/28/2022  Summary  There is normal isotope uptake at stress and rest. There is no evidence of myocardial ischemia or scar. The LVEF Is normal and the LV wall motion is normal.  This is a low risk scan. Last Cath (if available):    Last TTE/WOLFGANG(if available):7/25/2022  Summary  Left ventricular cavity size is normal. There is mildly increased left ventricular wall thickness. Overall left ventricular systolic function appears normal. Ejection fraction is visually estimated to be 60-65%. No regional wall motion abnormalities are noted. Normal diastolic function. Mild mitral and pulmonic regurgitation. Mild tricuspid regurgitation with an RVSP of 25mmHg.   IVC size is normal (<2.1cm) and collapses > 50% with respiration consistent with normal RA pressure (3mmHg). Last CMR  (if available):    Last Coronary Artery Calcium Score: Ankle-brachial index:    Carotid ultrasound screening:    Abdominal aortic aneurysm screening:    Assessment / Plan:     1. New onset a-fib ([address]    2. Essential hypertension    3. Chest pain, unspecified type    4. Bilateral leg edema      Orders Placed This Encounter   Procedures    Basic Metabolic Panel     Atrial Fibrillation  Continue toprol, eliquis; no further palpitations  Hypertension  DC amlodipine as maybe contributing to LE edema  Titrate losartan to 50 qday  If sbp >140 increase to 100 qday   BMP in week  LE edema  Amlodipine discontinued  Chest pain, resolved  Low risk stress test    Return in about 6 weeks (around 9/20/2022). I have personally reviewed the reports and images of labs, radiological studies, cardiac studies including ECG's and telemetry, current and old medical records. The note was completed using EMR and Dragon dictation system. Every effort was made to ensure accuracy; however, inadvertent computerized transcription errors may be present. All questions and concerns were addressed to the patient. I would like to thank you for providing me the opportunity to participate in the care of your patient. If you have any questions, please do not hesitate to contact me. Rosie Watts MD, [address] [address] [address] Office Phone: [phone]  Fax: [phone]    I, Lilliam Cabrera, RN, am scribing for and in the presence of Dr. Rosie Watts MD.  Lilliam Cabrera, RN     Physician Attestation:  The scribes documentation has been prepared under my direction and personally reviewed by me in its entirety. I confirm the note above accurately reflects all work, treatment, procedures, and medical decision making performed by me.     Electronically signed by Rosie Watts MD on 8/10/2022 at 9:25 PM

## 2022-08-09 ENCOUNTER — OFFICE VISIT (OUTPATIENT)
Dept: CARDIOLOGY CLINIC | Age: 72
End: 2022-08-09
Payer: MEDICARE

## 2022-08-09 VITALS
HEART RATE: 70 BPM | DIASTOLIC BLOOD PRESSURE: 70 MMHG | SYSTOLIC BLOOD PRESSURE: 110 MMHG | BODY MASS INDEX: 28.36 KG/M2 | OXYGEN SATURATION: 94 % | WEIGHT: 170.4 LBS

## 2022-08-09 DIAGNOSIS — R07.9 CHEST PAIN, UNSPECIFIED TYPE: ICD-10-CM

## 2022-08-09 DIAGNOSIS — I10 ESSENTIAL HYPERTENSION: ICD-10-CM

## 2022-08-09 DIAGNOSIS — I48.91 NEW ONSET A-FIB (HCC): Primary | ICD-10-CM

## 2022-08-09 DIAGNOSIS — R60.0 BILATERAL LEG EDEMA: ICD-10-CM

## 2022-08-09 PROCEDURE — 1036F TOBACCO NON-USER: CPT | Performed by: INTERNAL MEDICINE

## 2022-08-09 PROCEDURE — 1123F ACP DISCUSS/DSCN MKR DOCD: CPT | Performed by: INTERNAL MEDICINE

## 2022-08-09 PROCEDURE — G8417 CALC BMI ABV UP PARAM F/U: HCPCS | Performed by: INTERNAL MEDICINE

## 2022-08-09 PROCEDURE — G8427 DOCREV CUR MEDS BY ELIG CLIN: HCPCS | Performed by: INTERNAL MEDICINE

## 2022-08-09 PROCEDURE — 3017F COLORECTAL CA SCREEN DOC REV: CPT | Performed by: INTERNAL MEDICINE

## 2022-08-09 PROCEDURE — 1090F PRES/ABSN URINE INCON ASSESS: CPT | Performed by: INTERNAL MEDICINE

## 2022-08-09 PROCEDURE — G8399 PT W/DXA RESULTS DOCUMENT: HCPCS | Performed by: INTERNAL MEDICINE

## 2022-08-09 PROCEDURE — 99214 OFFICE O/P EST MOD 30 MIN: CPT | Performed by: INTERNAL MEDICINE

## 2022-08-09 RX ORDER — KETOCONAZOLE 20 MG/G
CREAM TOPICAL
COMMUNITY
Start: 2022-07-12

## 2022-08-09 RX ORDER — AMLODIPINE BESYLATE 10 MG/1
10 TABLET ORAL
COMMUNITY
Start: 2010-02-16 | End: 2022-08-09 | Stop reason: SINTOL

## 2022-08-09 RX ORDER — CLOTRIMAZOLE AND BETAMETHASONE DIPROPIONATE 10; .64 MG/G; MG/G
CREAM TOPICAL
COMMUNITY
Start: 2022-07-12

## 2022-08-09 RX ORDER — LOSARTAN POTASSIUM 50 MG/1
50 TABLET ORAL DAILY
Qty: 30 TABLET | Refills: 1 | Status: SHIPPED | OUTPATIENT
Start: 2022-08-09

## 2022-08-09 RX ORDER — ALCLOMETASONE DIPROPIONATE 0.5 MG/G
CREAM TOPICAL 2 TIMES DAILY
COMMUNITY

## 2022-08-09 NOTE — PATIENT INSTRUCTIONS
TAKE 50 MG Cozaar daily   Check your BP at home at least one time daily  If top BP # (systolic) is 231 or more consistently, start taking 100 mg Cozaar daily. In one week, have lab work drawn  Return in 6 weeks.

## 2022-08-10 ASSESSMENT — ENCOUNTER SYMPTOMS
VOMITING: 0
ABDOMINAL PAIN: 0

## 2022-08-31 ENCOUNTER — TELEPHONE (OUTPATIENT)
Dept: CARDIOLOGY CLINIC | Age: 72
End: 2022-08-31

## 2022-08-31 NOTE — TELEPHONE ENCOUNTER
Requested Prescriptions     Pending Prescriptions Disp Refills    apixaban (ELIQUIS) 5 MG TABS tablet 60 tablet 0     Sig: Take 1 tablet by mouth 2 times daily          Number: 180    Refills: 3    Last Office Visit: 8/9/2022     Next Office Visit: 9/20/2022     Pt is out

## 2022-09-19 ENCOUNTER — TELEPHONE (OUTPATIENT)
Dept: CARDIOLOGY CLINIC | Age: 72
End: 2022-09-19

## 2022-09-19 DIAGNOSIS — I10 ESSENTIAL HYPERTENSION: ICD-10-CM

## 2022-09-19 DIAGNOSIS — I48.91 NEW ONSET A-FIB (HCC): Primary | ICD-10-CM

## 2022-09-19 DIAGNOSIS — R00.2 PALPITATIONS: ICD-10-CM

## 2022-09-19 NOTE — PROGRESS NOTES
[facility]     Outpatient Cardiology         Patient Name:  Laura Monday  Requesting Physician: OMARI Landaverde CNP  Primary Care Physician: OMARI Landaverde CNP    Reason for Consultation/Chief Complaint:   Chief Complaint   Patient presents with    Follow-up           HPI:     80 y.o. female with hypertension and atrial fibrillation presents for follow up. Reports extensive family history of cardiac problems. She reports intolerance to eliquis and would like to switch anticoagulant. She presented to ED today. Cr increased from baseline. She attributes this to poor po intake. She has had extensive history of lower extremity edema, which she attributed to amlodipine. Resolved since starting diuretic and discontinue medication. History of cough with Lisinopril         Histories:     Past Medical History:   has a past medical history of Anxiety, Bilateral leg edema, Chest pressure, Dizziness, Herpes, Hypertension, Insomnia, New onset a-fib (HCC), Palpitations, Radial head fracture, closed, left, closed, initial encounter, and Tinnitus. Surgical History:   has a past surgical history that includes Hysterectomy; Breast biopsy; Shoulder arthroscopy (Left); Elbow surgery; [place] tooth extraction; Hysterectomy, total abdominal; Ankle surgery (Left, 01/21/2018); and Colonoscopy (N/A, 12/17/2021). Social History:   reports that she quit smoking about 31 years ago. Her smoking use included cigarettes. She has a 15.00 pack-year smoking history. She has never used smokeless tobacco. She reports current alcohol use. She reports that she does not use drugs. Family History:  No evidence for sudden cardiac death or premature CAD    Medications:     Home Medications:  Were reviewed and are listed in nursing record. and/or listed below    Prior to Admission medications    Medication Sig Start Date End Date Taking?  Authorizing Provider   omeprazole (PRILOSEC) 20 available):  ZIO Patch   6/28 -7/12/2022  Minimum HR of 41 BPM, maximum HR of 123 BPM, average HR of 67 BPM. Predominant underlying rhythm was SR. Isolated SVEs were rare (<1.0%), SVE Couplets were rare (<1.0%), and no SVE Triplets were present. Isolated VEs were rare (<1.0%), and no VE Couplets or VE Triplets were present. Ventricular Bigeminy and Trigeminy were present. Last Stress (if available): 7/28/2022  Summary  There is normal isotope uptake at stress and rest. There is no evidence of myocardial ischemia or scar. The LVEF Is normal and the LV wall motion is normal.  This is a low risk scan. Last Cath (if available):    Last TTE/WOLFGANG(if available):7/25/2022  Summary  Left ventricular cavity size is normal. There is mildly increased left ventricular wall thickness. Overall left ventricular systolic function appears normal. Ejection fraction is visually estimated to be 60-65%. No regional wall motion abnormalities are noted. Normal diastolic function. Mild mitral and pulmonic regurgitation. Mild tricuspid regurgitation with an RVSP of 25mmHg. IVC size is normal (<2.1cm) and collapses > 50% with respiration consistent with normal RA pressure (3mmHg). Last CMR  (if available):    Last Coronary Artery Calcium Score: Ankle-brachial index:    Carotid ultrasound screening:    Abdominal aortic aneurysm screening:    Assessment / Plan:     Atrial Fibrillation  Continue toprol, change ac to xarelto  Hypertension  BP now controlled, increase po intake  Continue losartan  LE edema  Amlodipine discontinued    Orders Placed This Encounter   Procedures    TSH with Reflex    Comprehensive Metabolic Panel    Magnesium       RTC in 4-6 weeks    I have personally reviewed the reports and images of labs, radiological studies, cardiac studies including ECG's and telemetry, current and old medical records. The note was completed using EMR and Dragon dictation system.  Every effort was made to ensure accuracy; however, inadvertent computerized transcription errors may be present. All questions and concerns were addressed to the patient. I would like to thank you for providing me the opportunity to participate in the care of your patient. If you have any questions, please do not hesitate to contact me.      Leah Briggs MD, [address] [address]  Main Office Phone: [phone]  Fax: [phone]

## 2022-09-19 NOTE — TELEPHONE ENCOUNTER
Pt states she forgot about BMP lab work prior to [address] 9/20; will have lab work day of OV.

## 2022-09-20 ENCOUNTER — OFFICE VISIT (OUTPATIENT)
Dept: CARDIOLOGY CLINIC | Age: 72
End: 2022-09-20
Payer: MEDICARE

## 2022-09-20 ENCOUNTER — HOSPITAL ENCOUNTER (EMERGENCY)
Age: 72
Discharge: HOME | End: 2022-09-20
Attending: EMERGENCY MEDICINE
Payer: MEDICARE

## 2022-09-20 ENCOUNTER — APPOINTMENT (OUTPATIENT)
Dept: GENERAL RADIOLOGY | Age: 72
End: 2022-09-20
Payer: MEDICARE

## 2022-09-20 VITALS
DIASTOLIC BLOOD PRESSURE: 80 MMHG | WEIGHT: 172 LBS | HEART RATE: 94 BPM | BODY MASS INDEX: 28.66 KG/M2 | SYSTOLIC BLOOD PRESSURE: 112 MMHG | HEIGHT: 65 IN | OXYGEN SATURATION: 96 %

## 2022-09-20 VITALS
TEMPERATURE: 98 F | SYSTOLIC BLOOD PRESSURE: 112 MMHG | BODY MASS INDEX: 28.66 KG/M2 | WEIGHT: 172 LBS | OXYGEN SATURATION: 95 % | RESPIRATION RATE: 17 BRPM | HEART RATE: 81 BPM | DIASTOLIC BLOOD PRESSURE: 72 MMHG | HEIGHT: 65 IN

## 2022-09-20 DIAGNOSIS — I10 HYPERTENSION, UNSPECIFIED TYPE: ICD-10-CM

## 2022-09-20 DIAGNOSIS — I48.91 ATRIAL FIBRILLATION, UNSPECIFIED TYPE (HCC): Primary | ICD-10-CM

## 2022-09-20 LAB
ANION GAP SERPL CALCULATED.3IONS-SCNC: 14 MMOL/L (ref 3–16)
BUN BLDV-MCNC: 20 MG/DL (ref 7–20)
CALCIUM SERPL-MCNC: 9.6 MG/DL (ref 8.3–10.6)
CHLORIDE BLD-SCNC: 97 MMOL/L (ref 99–110)
CO2: 26 MMOL/L (ref 21–32)
CREAT SERPL-MCNC: 1.3 MG/DL (ref 0.6–1.2)
EKG DIAGNOSIS: NORMAL
EKG Q-T INTERVAL: 354 MS
EKG QRS DURATION: 72 MS
EKG QTC CALCULATION (BAZETT): 421 MS
EKG R AXIS: 30 DEGREES
EKG T AXIS: 48 DEGREES
EKG VENTRICULAR RATE: 85 BPM
GFR AFRICAN AMERICAN: 49
GFR NON-AFRICAN AMERICAN: 40
GLUCOSE BLD-MCNC: 107 MG/DL (ref 70–99)
HCT VFR BLD CALC: 47.4 % (ref 36–48)
HEMOGLOBIN: 15.6 G/DL (ref 12–16)
MAGNESIUM: 2.1 MG/DL (ref 1.8–2.4)
MCH RBC QN AUTO: 27.4 PG (ref 26–34)
MCHC RBC AUTO-ENTMCNC: 33 G/DL (ref 31–36)
MCV RBC AUTO: 83.2 FL (ref 80–100)
PDW BLD-RTO: 14.4 % (ref 12.4–15.4)
PLATELET # BLD: 321 K/UL (ref 135–450)
PMV BLD AUTO: 8.1 FL (ref 5–10.5)
POTASSIUM REFLEX MAGNESIUM: 3.5 MMOL/L (ref 3.5–5.1)
RBC # BLD: 5.69 M/UL (ref 4–5.2)
SODIUM BLD-SCNC: 137 MMOL/L (ref 136–145)
TROPONIN: <0.01 NG/ML
WBC # BLD: 9.3 K/UL (ref 4–11)

## 2022-09-20 PROCEDURE — G8399 PT W/DXA RESULTS DOCUMENT: HCPCS | Performed by: INTERNAL MEDICINE

## 2022-09-20 PROCEDURE — 99285 EMERGENCY DEPT VISIT HI MDM: CPT

## 2022-09-20 PROCEDURE — 80048 BASIC METABOLIC PNL TOTAL CA: CPT

## 2022-09-20 PROCEDURE — 83735 ASSAY OF MAGNESIUM: CPT

## 2022-09-20 PROCEDURE — 84484 ASSAY OF TROPONIN QUANT: CPT

## 2022-09-20 PROCEDURE — G8417 CALC BMI ABV UP PARAM F/U: HCPCS | Performed by: INTERNAL MEDICINE

## 2022-09-20 PROCEDURE — 85027 COMPLETE CBC AUTOMATED: CPT

## 2022-09-20 PROCEDURE — 1036F TOBACCO NON-USER: CPT | Performed by: INTERNAL MEDICINE

## 2022-09-20 PROCEDURE — G8427 DOCREV CUR MEDS BY ELIG CLIN: HCPCS | Performed by: INTERNAL MEDICINE

## 2022-09-20 PROCEDURE — 1123F ACP DISCUSS/DSCN MKR DOCD: CPT | Performed by: INTERNAL MEDICINE

## 2022-09-20 PROCEDURE — 99214 OFFICE O/P EST MOD 30 MIN: CPT | Performed by: INTERNAL MEDICINE

## 2022-09-20 PROCEDURE — 3017F COLORECTAL CA SCREEN DOC REV: CPT | Performed by: INTERNAL MEDICINE

## 2022-09-20 PROCEDURE — 93005 ELECTROCARDIOGRAM TRACING: CPT | Performed by: PHYSICIAN ASSISTANT

## 2022-09-20 PROCEDURE — 71046 X-RAY EXAM CHEST 2 VIEWS: CPT

## 2022-09-20 PROCEDURE — 1090F PRES/ABSN URINE INCON ASSESS: CPT | Performed by: INTERNAL MEDICINE

## 2022-09-20 RX ORDER — OMEPRAZOLE 20 MG/1
20 CAPSULE, DELAYED RELEASE ORAL DAILY
COMMUNITY

## 2022-09-20 ASSESSMENT — ENCOUNTER SYMPTOMS
CHEST TIGHTNESS: 0
NAUSEA: 0
EYES NEGATIVE: 1
SHORTNESS OF BREATH: 1
ABDOMINAL PAIN: 0
BACK PAIN: 0
VOMITING: 0
COUGH: 0

## 2022-09-20 ASSESSMENT — PAIN - FUNCTIONAL ASSESSMENT: PAIN_FUNCTIONAL_ASSESSMENT: NONE - DENIES PAIN

## 2022-09-20 NOTE — ED PROVIDER NOTES
ED Attending Attestation Note     Date of evaluation: 9/20/2022    This patient was seen by the advance practice provider. I have seen and examined the patient, agree with the workup, evaluation, management and diagnosis. The care plan has been discussed. I have reviewed the ECG and concur with the ARNOLD's interpretation. My assessment reveals with history of atrial fibrillation here with A. Fib that she felt this morning. Prior to that the patient has felt poorly on Eliquis. A. fib is a new diagnosis for her in the Eliquis is a relatively recent medication. She has a cardiologist appointment later today but was unsure what to do with the symptoms. Work-up here is relatively unremarkable. She does appear mildly dehydrated but will be advised to orally hydrate and follow-up with her cardiologist today to discuss options for continued treatment of A. Fib.      Luis Eduardo Menard MD  09/20/22 [id number]

## 2022-09-20 NOTE — ED PROVIDER NOTES
[address] [facility] ENCOUNTER          PHYSICIAN ASSISTANT NOTE       Date of evaluation: 9/20/2022    Chief Complaint     Atrial Fibrillation (Pt stated felt afib this morning 6:20am)      History of Present Illness     Mandy Guzman is a 71 y.o. female who presents to the emergency department with the sensation that her heart is \"flipping and flopping with some shortness of breath\". The patient states she was seen approximately 1 month ago by her primary care physician and diagnosed with atrial fibrillation. She was started on Eliquis. She states she has been taking this medication as prescribed. She states she has her first follow-up appointment with cardiology this afternoon at 2:00. She denies chest pain, abdominal pain, nausea or vomiting. Denies pain radiation. Denies nausea or vomiting. She has had some shortness of breath but denies URI type symptoms. She denies lower extremity swelling. Review of Systems     Review of Systems   Constitutional:  Negative for chills and fever. HENT: Negative. Eyes: Negative. Respiratory:  Positive for shortness of breath. Negative for cough and chest tightness. Cardiovascular:  Positive for palpitations. Negative for chest pain and leg swelling. Gastrointestinal:  Negative for abdominal pain, nausea and vomiting. Endocrine: Negative. Genitourinary: Negative. Musculoskeletal:  Negative for back pain and neck pain. Skin: Negative. Neurological: Negative. Negative for dizziness, weakness, light-headedness and headaches. Psychiatric/Behavioral: Negative. All other systems reviewed and are negative. Past Medical, Surgical, Family, and Social History     She has a past medical history of Anxiety, Bilateral leg edema, Chest pressure, Dizziness, Herpes, Hypertension, Insomnia, New onset a-fib ([address] Palpitations, Radial head fracture, closed, left, closed, initial encounter, and Tinnitus.   She has a past surgical history that includes Hysterectomy; Breast biopsy; Shoulder arthroscopy (Left); Elbow surgery; [place] tooth extraction; Hysterectomy, total abdominal; Ankle surgery (Left, 01/21/2018); and Colonoscopy (N/A, 12/17/2021). Her family history includes Arthritis in her father, maternal aunt, and mother; Cancer in her father; Heart Disease in her father, mother, and sister; High Blood Pressure in her brother, brother, and father. She reports that she quit smoking about 31 years ago. Her smoking use included cigarettes. She has a 15.00 pack-year smoking history. She has never used smokeless tobacco. She reports current alcohol use. She reports that she does not use drugs. Medications     Current Discharge Medication List        CONTINUE these medications which have NOT CHANGED    Details   omeprazole (PRILOSEC) 20 MG delayed release capsule Take 20 mg by mouth daily      apixaban (ELIQUIS) 5 MG TABS tablet Take 1 tablet by mouth 2 times daily  Qty: 180 tablet, Refills: 3      clotrimazole-betamethasone (LOTRISONE) 1-0.05 % cream       ketoconazole (NIZORAL) 2 % cream       alclomethasone (ACLOVATE) 0.05 % cream Apply topically 2 times daily Apply topically 2 times daily. losartan (COZAAR) 50 MG tablet Take 1 tablet by mouth in the morning. Qty: 30 tablet, Refills: 1    Associated Diagnoses: New onset a-fib ([address] Essential hypertension; Chest pain, unspecified type; Bilateral leg edema      triamterene-hydroCHLOROthiazide (MAXZIDE) 75-50 MG per tablet TAKE 0.5 TABLET EVERY DAY  Qty: 90 tablet, Refills: 0    Associated Diagnoses: Essential hypertension      metoprolol succinate (TOPROL XL) 50 MG extended release tablet TAKE 1 TABLET TWICE A DAY  Qty: 90 tablet, Refills: 0      valACYclovir (VALTREX) 1 g tablet TAKE 2 TABLETS AT  ONSET OF COLD SORE, REPEAT  12 HOURS LATER  Qty: 36 tablet, Refills: 3    Associated Diagnoses: Cold sore             Allergies     She is allergic to lisinopril.     Physical Exam     INITIAL VITALS: BP: 99/67, Temp: 98 °F (36.7 °C), Heart Rate: 83, Resp: 18, SpO2: 95 %  Physical Exam  Vitals and nursing note reviewed. Constitutional:       General: She is not in acute distress. Appearance: Normal appearance. She is well-developed. HENT:      Head: Normocephalic and atraumatic. Right Ear: External ear normal.      Left Ear: External ear normal.      Nose: Nose normal.      Mouth/Throat:      Mouth: Mucous membranes are moist.   Eyes:      General:         Right eye: No discharge. Left eye: No discharge. Extraocular Movements: Extraocular movements intact. Conjunctiva/sclera: Conjunctivae normal.      Pupils: Pupils are equal, round, and reactive to light. Cardiovascular:      Pulses: Normal pulses. Heart sounds: No murmur heard. Comments: Irregularly irregular rhythm with no murmurs, rubs or gallops  Pulmonary:      Effort: Pulmonary effort is normal.      Breath sounds: Normal breath sounds. No wheezing or rhonchi. Abdominal:      General: Bowel sounds are normal.      Palpations: Abdomen is soft. Tenderness: no abdominal tenderness There is no guarding or rebound. Musculoskeletal:         General: Normal range of motion. Cervical back: Normal range of motion and neck supple. Comments: Distal pulses 2+ bilaterally of upper and lower extremities. She is moving all extremities without difficulty. No edema noted. Skin:     General: Skin is warm and dry. Capillary Refill: Capillary refill takes less than 2 seconds. Neurological:      General: No focal deficit present. Mental Status: She is alert and oriented to person, place, and time. Psychiatric:         Mood and Affect: Mood normal.         Behavior: Behavior normal.         Thought Content:  Thought content normal.       Diagnostic Results     EKG   Interpreted in conjunction with emergency department physician Leonard Obrien MD  Rhythm: atrial fibrillation - controlled with a rate of 85. No ST elevation or depression. No acute ischemic patterns. RADIOLOGY:  XR CHEST (2 VW)   Final Result   1. No acute cardiopulmonary abnormalities. 2. No interval changes             LABS:   Results for orders placed or performed during the hospital encounter of 09/20/22   CBC   Result Value Ref Range    WBC 9.3 4.0 - 11.0 K/uL    RBC 5.69 (H) 4.00 - 5.20 M/uL    Hemoglobin 15.6 12.0 - 16.0 g/dL    Hematocrit 47.4 36.0 - 48.0 %    MCV 83.2 80.0 - 100.0 fL    MCH 27.4 26.0 - 34.0 pg    MCHC 33.0 31.0 - 36.0 g/dL    RDW 14.4 12.4 - 15.4 %    Platelets 609 841 - 713 K/uL    MPV 8.1 5.0 - 10.5 fL   Troponin   Result Value Ref Range    Troponin <0.01 <0.01 ng/mL   Basic Metabolic Panel w/ Reflex to MG   Result Value Ref Range    Sodium 137 136 - 145 mmol/L    Potassium reflex Magnesium 3.5 3.5 - 5.1 mmol/L    Chloride 97 (L) 99 - 110 mmol/L    CO2 26 21 - 32 mmol/L    Anion Gap 14 3 - 16    Glucose 107 (H) 70 - 99 mg/dL    BUN 20 7 - 20 mg/dL    Creatinine 1.3 (H) 0.6 - 1.2 mg/dL    GFR Non- 40 (A) >60    GFR  49 (A) >60    Calcium 9.6 8.3 - 10.6 mg/dL   EKG 12 Lead   Result Value Ref Range    Ventricular Rate 85 BPM    QRS Duration 72 ms    Q-T Interval 354 ms    QTc Calculation (Bazett) 421 ms    R Axis 30 degrees    T Axis 48 degrees    Diagnosis       EKG performed in ER and to be interpreted by ER physician. Confirmed by MD, ER ([id number]),  Ravi yHde ([id number]) on 9/20/2022 9:35:02 AM       ED BEDSIDE ULTRASOUND:  No results found. RECENT VITALS:  BP: 112/72, Temp: 98 °F (36.7 °C), Heart Rate: 81, Resp: 17, SpO2: 95 %     Procedures         ED Course     Nursing Notes, Past Medical Hx,Past Surgical Hx, Social Hx, Allergies, and Family Hx were reviewed. The patient was given the following medications:  No orders of the defined types were placed in this encounter.       CONSULTS:  None    MEDICAL DECISION MAKING /

## 2022-09-20 NOTE — DISCHARGE INSTRUCTIONS
-continue home medications as prescribed  -Follow-up with your cardiologist this afternoon as scheduled  -Return for worsening symptoms such as fevers of 100.5 or greater not relieved by Tylenol or Motrin, chest pain, shortness of breath, lower extremity swelling or other concerns

## 2022-09-27 DIAGNOSIS — I10 ESSENTIAL HYPERTENSION: ICD-10-CM

## 2022-09-27 DIAGNOSIS — I48.91 ATRIAL FIBRILLATION, UNSPECIFIED TYPE (HCC): ICD-10-CM

## 2022-09-27 DIAGNOSIS — I10 HYPERTENSION, UNSPECIFIED TYPE: ICD-10-CM

## 2022-09-27 DIAGNOSIS — I48.91 NEW ONSET A-FIB (HCC): ICD-10-CM

## 2022-09-27 DIAGNOSIS — R00.2 PALPITATIONS: ICD-10-CM

## 2022-09-27 LAB
A/G RATIO: 2 (ref 1.1–2.2)
ALBUMIN SERPL-MCNC: 4.2 G/DL (ref 3.4–5)
ALP BLD-CCNC: 86 U/L (ref 40–129)
ALT SERPL-CCNC: 15 U/L (ref 10–40)
ANION GAP SERPL CALCULATED.3IONS-SCNC: 12 MMOL/L (ref 3–16)
ANION GAP SERPL CALCULATED.3IONS-SCNC: 12 MMOL/L (ref 3–16)
AST SERPL-CCNC: 17 U/L (ref 15–37)
BILIRUB SERPL-MCNC: 0.3 MG/DL (ref 0–1)
BUN BLDV-MCNC: 20 MG/DL (ref 7–20)
BUN BLDV-MCNC: 21 MG/DL (ref 7–20)
CALCIUM SERPL-MCNC: 9.4 MG/DL (ref 8.3–10.6)
CALCIUM SERPL-MCNC: 9.5 MG/DL (ref 8.3–10.6)
CHLORIDE BLD-SCNC: 100 MMOL/L (ref 99–110)
CHLORIDE BLD-SCNC: 99 MMOL/L (ref 99–110)
CO2: 29 MMOL/L (ref 21–32)
CO2: 29 MMOL/L (ref 21–32)
CREAT SERPL-MCNC: 0.8 MG/DL (ref 0.6–1.2)
CREAT SERPL-MCNC: 0.8 MG/DL (ref 0.6–1.2)
GFR AFRICAN AMERICAN: >60
GFR AFRICAN AMERICAN: >60
GFR NON-AFRICAN AMERICAN: >60
GFR NON-AFRICAN AMERICAN: >60
GLUCOSE BLD-MCNC: 100 MG/DL (ref 70–99)
GLUCOSE BLD-MCNC: 101 MG/DL (ref 70–99)
MAGNESIUM: 2.1 MG/DL (ref 1.8–2.4)
POTASSIUM SERPL-SCNC: 3.8 MMOL/L (ref 3.5–5.1)
POTASSIUM SERPL-SCNC: 3.9 MMOL/L (ref 3.5–5.1)
SODIUM BLD-SCNC: 140 MMOL/L (ref 136–145)
SODIUM BLD-SCNC: 141 MMOL/L (ref 136–145)
TOTAL PROTEIN: 6.3 G/DL (ref 6.4–8.2)
TSH REFLEX: 1.4 UIU/ML (ref 0.27–4.2)

## 2022-09-30 ENCOUNTER — OFFICE VISIT (OUTPATIENT)
Dept: CARDIOLOGY CLINIC | Age: 72
End: 2022-09-30
Payer: MEDICARE

## 2022-09-30 VITALS
BODY MASS INDEX: 30.09 KG/M2 | SYSTOLIC BLOOD PRESSURE: 110 MMHG | WEIGHT: 180.8 LBS | HEART RATE: 86 BPM | DIASTOLIC BLOOD PRESSURE: 60 MMHG

## 2022-09-30 DIAGNOSIS — I48.0 PAF (PAROXYSMAL ATRIAL FIBRILLATION) (HCC): ICD-10-CM

## 2022-09-30 DIAGNOSIS — I10 ESSENTIAL HYPERTENSION: ICD-10-CM

## 2022-09-30 DIAGNOSIS — R53.83 FATIGUE, UNSPECIFIED TYPE: Primary | ICD-10-CM

## 2022-09-30 PROCEDURE — 3017F COLORECTAL CA SCREEN DOC REV: CPT | Performed by: NURSE PRACTITIONER

## 2022-09-30 PROCEDURE — G8399 PT W/DXA RESULTS DOCUMENT: HCPCS | Performed by: NURSE PRACTITIONER

## 2022-09-30 PROCEDURE — 1090F PRES/ABSN URINE INCON ASSESS: CPT | Performed by: NURSE PRACTITIONER

## 2022-09-30 PROCEDURE — 1036F TOBACCO NON-USER: CPT | Performed by: NURSE PRACTITIONER

## 2022-09-30 PROCEDURE — 99214 OFFICE O/P EST MOD 30 MIN: CPT | Performed by: NURSE PRACTITIONER

## 2022-09-30 PROCEDURE — G8427 DOCREV CUR MEDS BY ELIG CLIN: HCPCS | Performed by: NURSE PRACTITIONER

## 2022-09-30 PROCEDURE — G8417 CALC BMI ABV UP PARAM F/U: HCPCS | Performed by: NURSE PRACTITIONER

## 2022-09-30 PROCEDURE — 1123F ACP DISCUSS/DSCN MKR DOCD: CPT | Performed by: NURSE PRACTITIONER

## 2022-09-30 RX ORDER — METOPROLOL SUCCINATE 50 MG/1
50 TABLET, EXTENDED RELEASE ORAL DAILY
Qty: 90 TABLET | Refills: 0
Start: 2022-09-30

## 2022-09-30 NOTE — PROGRESS NOTES
CC Fatigue     HPI:  71 y.o. patient of Dr Tigist Maher with HTN, pAF, and LE edema who is here with c/o tiredness/fatigue and headache. She also notes hot flashes, sweating and low mood. She occasional can feel when in A fib d/t palpitations. No c/o cp, sob, LH/dizziness, palpitations, syncope or falls. No LE edema, orthopnea, abdominal bloating or earlys satiety. No n/v/d, fever or Gi/Gu bleeding. She did decrease Toprol to once a day the last few days but hasn't noticed a change in fatigue. Past Medical History:   Diagnosis Date    Anxiety     Bilateral leg edema     Chest pressure     Dizziness     Herpes     opthalmic    Hypertension     Insomnia     New onset a-fib (HCC)     Palpitations     Radial head fracture, closed, left, closed, initial encounter 2018    Tinnitus      Past Surgical History:   Procedure Laterality Date    ANKLE SURGERY Left 2018    LEFT ANKLE OPEN REDUCTION INTERNAL FIXATION    BREAST BIOPSY      COLONOSCOPY N/A 2021    COLONOSCOPY POLYPECTOMY SNARE/COLD BIOPSY performed by Yen Gruber MD at [address] [place] [address]      right     HYSTERECTOMY (CERVIX STATUS UNKNOWN)      HYSTERECTOMY, TOTAL ABDOMINAL (CERVIX REMOVED)      SHOULDER ARTHROSCOPY Left     WISDOM TOOTH EXTRACTION       Family History   Problem Relation Age of Onset    Arthritis Mother     Heart Disease Mother     Heart Disease Father     High Blood Pressure Father     Cancer Father         liver, bladder    Arthritis Father     Heart Disease Sister     High Blood Pressure Brother     Arthritis Maternal Aunt     High Blood Pressure Brother      Social History     Tobacco Use    Smoking status: Former     Packs/day: 0.75     Years: 20.00     Pack years: 15.00     Types: Cigarettes     Quit date: 1991     Years since quittin.6    Smokeless tobacco: Never   Vaping Use    Vaping Use: Never used   Substance Use Topics    Alcohol use: Yes     Comment: very rare    Drug use:  No Allergies:Lisinopril    Review of Systems  General: No changes in weight,  or night sweats. +fatigue  HEENT: No blurry or decreased vision. No changes in hearing, nasal discharge or sore throat. Cardiovascular:  See HPI. Respiratory: No cough, hemoptysis, or wheezing. Gastrointestinal:  No abdominal pain, hematochezia, melana, constipation, diarrhea, or history of GI ulcers. Genito-Urinary: No dysuria or hematuria. No urgency or polyuria. Musculoskeletal:  No complaints of joint pain, joint swelling or muscular weakness/soreness. Neurological:  No dizziness, headaches, numbness/tingling, speech problems or weakness. Psychological:  No anxiety or depression. Hematological and Lymphatic: No abnormal bleeding or bruising, blood clots, jaundice or swollen lymph nodes. Endocrine:   No malaise/lethargy, palpitations, polydipsia/polyuria, temperature intolerance or unexpected weight changes  Skin:  No rashes or non-healing ulcers. The 10-year ASCVD risk score (Luke MARTINEZ, et al., 2019) is: 10.6%    Values used to calculate the score:      Age: 71 years      Sex: Female      Is Non- : No      Diabetic: No      Tobacco smoker: No      Systolic Blood Pressure: 323 mmHg      Is BP treated: Yes      HDL Cholesterol: 57 mg/dL      Total Cholesterol: 183 mg/dL    Physical Exam:  /60 (Site: Left Upper Arm, Position: Sitting, Cuff Size: Medium Adult)   Pulse 86   Wt 180 lb 12.8 oz (82 kg)   BMI 30.09 kg/m²    General (appearance):  No acute distress  Eyes: anicteric   Neck: soft, No JVD  Ears/Nose/Mouth/Thorat: No cyanosis  CV: RRR   Respiratory:  clear, normal effort  GI: soft, non-tender, non-distended  Skin: Warm, dry. No rashes  Neuro/Psych: Alert and oriented x 3. Appropriate behavior  Ext:  No c/c.  No edema  Pulses:  2+ radial     Weight  Wt Readings from Last 3 Encounters:   09/20/22 172 lb (78 kg)   09/20/22 172 lb (78 kg)   08/09/22 170 lb 6.4 oz (77.3 kg)          CBC: Lab Results   Component Value Date    WBC 9.3 2022    HGB 15.6 2022    HCT 47.4 2022    MCV 83.2 2022     2022     BMP:  Lab Results   Component Value Date    CREATININE 0.8 2022    CREATININE 0.8 2022    BUN 20 2022    BUN 21 (H) 2022     2022     2022    K 3.9 2022    K 3.8 2022     2022    CL 99 2022    CO2 29 2022    CO2 29 2022     CrCl cannot be calculated (Unknown ideal weight. ). Mag:   Lab Results   Component Value Date/Time    MG 2.10 2022 12:42 PM     LIVER PROFILE:   Lab Results   Component Value Date    ALT 15 2022    AST 17 2022    ALKPHOS 86 2022    BILITOT 0.3 2022     PT/INR:   Lab Results   Component Value Date    INR 0.96 2022    INR 1.04 2018    PROTIME 12.7 2022    PROTIME 11.8 2018     Pro-BNP   Lab Results   Component Value Date/Time    PROBNP 596 2022 01:12 PM     LIPIDS:  No components found for: CHLPL  Lab Results   Component Value Date    TRIG 146 2020    TRIG 215 (H) 2019    TRIG 121 2018     Lab Results   Component Value Date    HDL 57 2022    HDL 44 2020    HDL 49 2019     Lab Results   Component Value Date    LDLCALC 94 2022    LDLCALC 106 (H) 2020    LDLCALC 107 (H) 2019     Lab Results   Component Value Date    LABVLDL 32 2022    LABVLDL 29 2020    LABVLDL 43 2019     TSH:  Lab Results   Component Value Date    TSH 1.93 2018       IMAGIN/2022 Zio   /28 -2022  Minimum HR of 41 BPM, maximum HR of 123 BPM, average HR of 67 BPM. Predominant underlying rhythm was SR. Isolated SVEs were rare (<1.0%), SVE Couplets were rare (<1.0%), and no SVE Triplets were present. Isolated VEs were rare (<1.0%), and no VE Couplets or VE Triplets were present. Ventricular Bigeminy and Trigeminy were present.      2022 Nuc There is normal isotope uptake at stress and rest. There is no evidence of myocardial ischemia or scar. The LVEF Is normal and the LV wall motion is normal.  This is a low risk scan    7/25/2022 Echo  Left ventricular cavity size is normal. There is mildly increased left ventricular wall thickness. Overall left ventricular systolic function appears normal. Ejection fraction is visually estimated to be 60-65%. No regional wall motion abnormalities are noted. Normal diastolic function. Mild mitral and pulmonic regurgitation. Mild tricuspid regurgitation with an RVSP of 25mmHg. IVC size is normal (<2.1cm) and collapses > 50% with respiration consistent with normal RA pressure (3mmHg    Medications:   Current Outpatient Medications   Medication Sig Dispense Refill    omeprazole (PRILOSEC) 20 MG delayed release capsule Take 20 mg by mouth daily      rivaroxaban (XARELTO) 20 MG TABS tablet Take 1 tablet by mouth daily (with breakfast) 30 tablet 11    clotrimazole-betamethasone (LOTRISONE) 1-0.05 % cream       ketoconazole (NIZORAL) 2 % cream       alclomethasone (ACLOVATE) 0.05 % cream Apply topically 2 times daily Apply topically 2 times daily. losartan (COZAAR) 50 MG tablet Take 1 tablet by mouth in the morning. 30 tablet 1    triamterene-hydroCHLOROthiazide (MAXZIDE) 75-50 MG per tablet TAKE 0.5 TABLET EVERY DAY 90 tablet 0    metoprolol succinate (TOPROL XL) 50 MG extended release tablet TAKE 1 TABLET TWICE A DAY 90 tablet 0    valACYclovir (VALTREX) 1 g tablet TAKE 2 TABLETS AT  ONSET OF COLD SORE, REPEAT  12 HOURS LATER (Patient taking differently: as needed TAKE 2 TABLETS AT  ONSET OF COLD SORE, REPEAT  12 HOURS LATER) 36 tablet 3     No current facility-administered medications for this visit. Assessment:  1. Fatigue, unspecified type    2. PAF (paroxysmal atrial fibrillation) ([address]    3. Essential hypertension        Plan:  Fatigue : acute     Nuc neg ischemia   Echo EF 65%.  No CHF   TSH normal   14 day zio did not show any a fib and RARE PVC/PAC   She's been on toprol for a while and didn't have fatigue when first started    Cont toprol 50 mg once a dy  She will hold losartan and maxide for a few days each to see if holding either medications makes a difference    pAF: acute   Xarelto   Pt did not tolerate eliquis but not interested in warfarin/INR  Discussed EP referral for A Fib management.  Pt declined     HTN: stable    /60   Losartan   Maxide         Reviewed most recent: CBC, BMP, LFT, Lipids, Mag, PT/INR, BNP, TSH  Reviewed most recent: ECG, Echo, Nuc stress test,  Alycia

## 2022-10-18 ENCOUNTER — OFFICE VISIT (OUTPATIENT)
Dept: FAMILY MEDICINE CLINIC | Age: 72
End: 2022-10-18
Payer: MEDICARE

## 2022-10-18 VITALS
TEMPERATURE: 97.8 F | HEART RATE: 63 BPM | DIASTOLIC BLOOD PRESSURE: 82 MMHG | OXYGEN SATURATION: 98 % | BODY MASS INDEX: 30.16 KG/M2 | WEIGHT: 181 LBS | HEIGHT: 65 IN | SYSTOLIC BLOOD PRESSURE: 122 MMHG

## 2022-10-18 DIAGNOSIS — B96.89 ACUTE BACTERIAL SINUSITIS: ICD-10-CM

## 2022-10-18 DIAGNOSIS — J01.90 ACUTE BACTERIAL SINUSITIS: ICD-10-CM

## 2022-10-18 PROCEDURE — 3017F COLORECTAL CA SCREEN DOC REV: CPT | Performed by: NURSE PRACTITIONER

## 2022-10-18 PROCEDURE — G8417 CALC BMI ABV UP PARAM F/U: HCPCS | Performed by: NURSE PRACTITIONER

## 2022-10-18 PROCEDURE — 1036F TOBACCO NON-USER: CPT | Performed by: NURSE PRACTITIONER

## 2022-10-18 PROCEDURE — G8399 PT W/DXA RESULTS DOCUMENT: HCPCS | Performed by: NURSE PRACTITIONER

## 2022-10-18 PROCEDURE — 1090F PRES/ABSN URINE INCON ASSESS: CPT | Performed by: NURSE PRACTITIONER

## 2022-10-18 PROCEDURE — 99213 OFFICE O/P EST LOW 20 MIN: CPT | Performed by: NURSE PRACTITIONER

## 2022-10-18 PROCEDURE — 1123F ACP DISCUSS/DSCN MKR DOCD: CPT | Performed by: NURSE PRACTITIONER

## 2022-10-18 PROCEDURE — G8427 DOCREV CUR MEDS BY ELIG CLIN: HCPCS | Performed by: NURSE PRACTITIONER

## 2022-10-18 PROCEDURE — G8484 FLU IMMUNIZE NO ADMIN: HCPCS | Performed by: NURSE PRACTITIONER

## 2022-10-18 RX ORDER — GUAIFENESIN 600 MG/1
1200 TABLET, EXTENDED RELEASE ORAL 2 TIMES DAILY
COMMUNITY

## 2022-10-18 RX ORDER — AMOXICILLIN AND CLAVULANATE POTASSIUM 875; 125 MG/1; MG/1
1 TABLET, FILM COATED ORAL 2 TIMES DAILY
Qty: 14 TABLET | Refills: 0 | Status: SHIPPED | OUTPATIENT
Start: 2022-10-18 | End: 2022-10-25

## 2022-10-18 ASSESSMENT — ENCOUNTER SYMPTOMS
COUGH: 0
BLOOD IN STOOL: 0
SINUS PRESSURE: 0
CONSTIPATION: 0
SORE THROAT: 0
SHORTNESS OF BREATH: 0
EYE REDNESS: 0
DIARRHEA: 0
RHINORRHEA: 0
WHEEZING: 0
ABDOMINAL PAIN: 0
EYE ITCHING: 0
COLOR CHANGE: 0
CHEST TIGHTNESS: 0
BACK PAIN: 0
NAUSEA: 0
VOMITING: 0

## 2022-10-18 NOTE — PROGRESS NOTES
Ab Jeter (:  1950) is a 71 y.o. female,Established patient, here for evaluation of the following chief complaint(s): Other (Coughing covid negative)      ASSESSMENT/PLAN:  1. Acute bacterial sinusitis  Assessment & Plan: Will start patient on Augmentin. Continue with symptomatic management, increased water intake, saline irrigation, as well as mucolytics and antihistamines as needed. Patient advised to call back if no improvement. No follow-ups on file. SUBJECTIVE/OBJECTIVE:  Patient the office today with a cough. She tested for COVID at home and it was negative. She has no history of asthma or bronchitis. She states that she has been taking mucinex without any improvement. States that she is wheezing from the sinus drainage on the back of her throat she has no other issues or concerns. Current Outpatient Medications   Medication Sig Dispense Refill    dextromethorphan-guaiFENesin (MUCINEX DM)  MG per extended release tablet Take 1 tablet by mouth every 12 hours as needed      guaiFENesin (MUCINEX) 600 MG extended release tablet Take 1,200 mg by mouth 2 times daily      amoxicillin-clavulanate (AUGMENTIN) 875-125 MG per tablet Take 1 tablet by mouth 2 times daily for 7 days 14 tablet 0    L-Lysine 1000 MG TABS Take by mouth      metoprolol succinate (TOPROL XL) 50 MG extended release tablet Take 1 tablet by mouth daily TAKE 1 TABLET TWICE A DAY 90 tablet 0    omeprazole (PRILOSEC) 20 MG delayed release capsule Take 20 mg by mouth daily      rivaroxaban (XARELTO) 20 MG TABS tablet Take 1 tablet by mouth daily (with breakfast) 30 tablet 11    clotrimazole-betamethasone (LOTRISONE) 1-0.05 % cream       ketoconazole (NIZORAL) 2 % cream       alclomethasone (ACLOVATE) 0.05 % cream Apply topically 2 times daily Apply topically 2 times daily. losartan (COZAAR) 50 MG tablet Take 1 tablet by mouth in the morning.  30 tablet 1    triamterene-hydroCHLOROthiazide (MAXZIDE) 75-50 MG per tablet TAKE 0.5 TABLET EVERY DAY 90 tablet 0    valACYclovir (VALTREX) 1 g tablet TAKE 2 TABLETS AT  ONSET OF COLD SORE, REPEAT  12 HOURS LATER (Patient taking differently: as needed TAKE 2 TABLETS AT  ONSET OF COLD SORE, REPEAT  12 HOURS LATER) 36 tablet 3    Probiotic Product (PROBIOTIC-10 PO) Take by mouth (Patient not taking: Reported on 10/18/2022)       No current facility-administered medications for this visit. Review of Systems   Constitutional:  Negative for chills, fatigue and fever. HENT:  Negative for congestion, ear pain, postnasal drip, rhinorrhea, sinus pressure, sneezing and sore throat. Eyes:  Negative for redness and itching. Respiratory:  Negative for cough, chest tightness, shortness of breath and wheezing. Cardiovascular:  Negative for chest pain and palpitations. Gastrointestinal:  Negative for abdominal pain, blood in stool, constipation, diarrhea, nausea and vomiting. Endocrine: Negative for cold intolerance and heat intolerance. Genitourinary:  Negative for difficulty urinating, dysuria, flank pain, frequency, hematuria and urgency. Musculoskeletal:  Negative for arthralgias, back pain, joint swelling and myalgias. Skin:  Negative for color change, pallor, rash and wound. Allergic/Immunologic: Negative for environmental allergies and food allergies. Neurological:  Negative for dizziness, seizures, syncope, weakness, light-headedness, numbness and headaches. Hematological:  Negative for adenopathy. Does not bruise/bleed easily. Psychiatric/Behavioral:  Negative for confusion, sleep disturbance and suicidal ideas. The patient is not nervous/anxious and is not hyperactive. Vitals:    10/18/22 1520   BP: 122/82   Site: Left Upper Arm   Position: Sitting   Cuff Size: Medium Adult   Pulse: 63   Temp: 97.8 °F (36.6 °C)   SpO2: 98%   Weight: 181 lb (82.1 kg)   Height: 5' 5\" (1.651 m)       Physical Exam  Constitutional:       Appearance: Normal appearance.  She is well-developed. HENT:      Head: Normocephalic and atraumatic. Right Ear: Hearing normal.      Left Ear: Hearing normal.      Nose: No mucosal edema. Right Sinus: No maxillary sinus tenderness or frontal sinus tenderness. Left Sinus: No maxillary sinus tenderness or frontal sinus tenderness. Mouth/Throat: Tonsils: No tonsillar abscesses. Eyes:      Extraocular Movements: Extraocular movements intact. Pupils: Pupils are equal, round, and reactive to light. Cardiovascular:      Rate and Rhythm: Normal rate and regular rhythm. Pulses: Normal pulses. Heart sounds: Normal heart sounds. Pulmonary:      Effort: Pulmonary effort is normal.      Breath sounds: Normal breath sounds. Lymphadenopathy:      Head:      Right side of head: No submental, submandibular, tonsillar, preauricular, posterior auricular or occipital adenopathy. Left side of head: No submental, submandibular, tonsillar, preauricular, posterior auricular or occipital adenopathy. Skin:     General: Skin is warm and dry. Neurological:      Mental Status: She is alert. Psychiatric:         Mood and Affect: Mood normal.         Behavior: Behavior normal.               An electronic signature was used to authenticate this note.     --Amarilis Sandy, OMARI - CNP

## 2022-10-20 NOTE — PROGRESS NOTES
[facility]     Outpatient Cardiology         Patient Name:  Jason Payer  Requesting Physician: OMARI Degroot CNP  Primary Care Physician: OMARI Degroot CNP    Reason for Consultation/Chief Complaint:   No chief complaint on file. HPI:     80 y.o. female with hypertension, palpitations, atrial fibrillation and extensive family history of cardiac problems. She presents today for follow up. Patient denies exertional chest pain/pressure, dyspnea at rest, ZAYAS, PND, orthopnea, palpitations, lightheadedness, weight changes, changes in LE edema, and syncope. Palpitations     Atrial Fibrillation  Toprol 50 mg daily, Xarelto 20 mg daily    HTN, controlled  Losartan 50 mg daily    LE edema        Histories:     Past Medical History:   has a past medical history of Anxiety, Bilateral leg edema, Chest pressure, Dizziness, Herpes, Hypertension, Insomnia, New onset a-fib (HCC), Palpitations, Radial head fracture, closed, left, closed, initial encounter, and Tinnitus. Surgical History:   has a past surgical history that includes Hysterectomy; Breast biopsy; Shoulder arthroscopy (Left); Elbow surgery; [place] tooth extraction; Hysterectomy, total abdominal; Ankle surgery (Left, 01/21/2018); and Colonoscopy (N/A, 12/17/2021). Social History:   reports that she quit smoking about 31 years ago. Her smoking use included cigarettes. She has a 15.00 pack-year smoking history. She has never used smokeless tobacco. She reports current alcohol use. She reports that she does not use drugs. Family History:  No evidence for sudden cardiac death or premature CAD    Medications:     Home Medications:  Were reviewed and are listed in nursing record. and/or listed below    Prior to Admission medications    Medication Sig Start Date End Date Taking?  Authorizing Provider   dextromethorphan-guaiFENesin [facility] DM)  MG per extended release tablet Take 1 tablet by mouth every 12 hours as needed    Historical Provider, MD   guaiFENesin (MUCINEX) 600 MG extended release tablet Take 1,200 mg by mouth 2 times daily    Historical Provider, MD   amoxicillin-clavulanate (AUGMENTIN) 875-125 MG per tablet Take 1 tablet by mouth 2 times daily for 7 days 10/18/22 10/25/22  OMARI Schmidt CNP   Probiotic Product (PROBIOTIC-10 PO) Take by mouth  Patient not taking: Reported on 10/18/2022    Historical Provider, MD   L-Lysine 1000 MG TABS Take by mouth    Historical Provider, MD   metoprolol succinate (TOPROL XL) 50 MG extended release tablet Take 1 tablet by mouth daily TAKE 1 TABLET TWICE A DAY 9/30/22   OMARI Diego CNP   omeprazole (PRILOSEC) 20 MG delayed release capsule Take 20 mg by mouth daily    Historical Provider, MD   rivaroxaban (XARELTO) 20 MG TABS tablet Take 1 tablet by mouth daily (with breakfast) 9/20/22   Sourav Ball MD   clotrimazole-betamethasone (Nadya Anthon) 1-0.05 % cream  7/12/22   Historical Provider, MD   ketoconazole (NIZORAL) 2 % cream  7/12/22   Historical Provider, MD   alclomethasone (ACLOVATE) 0.05 % cream Apply topically 2 times daily Apply topically 2 times daily. Historical Provider, MD   losartan (COZAAR) 50 MG tablet Take 1 tablet by mouth in the morning. 8/9/22   Sourav Ball MD   triamterene-hydroCHLOROthiazide (MAXZIDE) 75-50 MG per tablet TAKE 0.5 TABLET EVERY DAY 7/20/22   Adriana Artis MD   valACYclovir (VALTREX) 1 g tablet TAKE 2 TABLETS AT  ONSET OF COLD SORE, REPEAT  12 HOURS LATER  Patient taking differently: as needed TAKE 2 TABLETS AT  ONSET OF COLD SORE, REPEAT  12 HOURS LATER 3/5/22   Liss Cooley DO        Allergy:     Lisinopril     Review of Systems:       Physical Examination:     There were no vitals filed for this visit.         Wt Readings from Last 3 Encounters:   10/18/22 181 lb (82.1 kg)   09/30/22 180 lb 12.8 oz (82 kg)   09/20/22 172 lb (78 kg)              Labs:     Lab Results Component Value Date    WBC 9.3 09/20/2022    HGB 15.6 09/20/2022    HCT 47.4 09/20/2022    MCV 83.2 09/20/2022     09/20/2022     Lab Results   Component Value Date     09/27/2022     09/27/2022    K 3.9 09/27/2022    K 3.8 09/27/2022     09/27/2022    CL 99 09/27/2022    CO2 29 09/27/2022    CO2 29 09/27/2022    BUN 20 09/27/2022    BUN 21 (H) 09/27/2022    CREATININE 0.8 09/27/2022    CREATININE 0.8 09/27/2022    GLUCOSE 101 (H) 09/27/2022    GLUCOSE 100 (H) 09/27/2022    CALCIUM 9.4 09/27/2022    CALCIUM 9.5 09/27/2022    PROT 6.3 (L) 09/27/2022    LABALBU 4.2 09/27/2022    BILITOT 0.3 09/27/2022    ALKPHOS 86 09/27/2022    AST 17 09/27/2022    ALT 15 09/27/2022    LABGLOM >60 09/27/2022    LABGLOM >60 09/27/2022    GFRAA >60 09/27/2022    GFRAA >60 09/27/2022    AGRATIO 2.0 09/27/2022    GLOB 2.4 12/22/2020         Lab Results   Component Value Date    CHOL 179 12/22/2020    CHOL 199 03/02/2019    CHOL 178 05/18/2018     Lab Results   Component Value Date    TRIG 146 12/22/2020    TRIG 215 (H) 03/02/2019    TRIG 121 05/18/2018     Lab Results   Component Value Date    HDL 57 05/26/2022    HDL 44 12/22/2020    HDL 49 03/02/2019     Lab Results   Component Value Date    LDLCALC 94 05/26/2022    LDLCALC 106 (H) 12/22/2020    LDLCALC 107 (H) 03/02/2019     Lab Results   Component Value Date    LABVLDL 32 05/26/2022    LABVLDL 29 12/22/2020    LABVLDL 43 03/02/2019     No results found for: [facility]    Lab Results   Component Value Date    INR 0.96 07/20/2022    INR 1.04 01/20/2018    PROTIME 12.7 07/20/2022    PROTIME 11.8 01/20/2018       The 10-year ASCVD risk score (Luke MARTINEZ, et al., 2019) is: 12.4%    Values used to calculate the score:      Age: 71 years      Sex: Female      Is Non- : No      Diabetic: No      Tobacco smoker: No      Systolic Blood Pressure: 811 mmHg      Is BP treated: Yes      HDL Cholesterol: 57 mg/dL      Total Cholesterol: 183 mg/dL      Imaging:       ECG (if available, Personally interpreted):    Last Monitor/Holter (if available):  ZIO Patch   6/28 -7/12/2022  Minimum HR of 41 BPM, maximum HR of 123 BPM, average HR of 67 BPM. Predominant underlying rhythm was SR. Isolated SVEs were rare (<1.0%), SVE Couplets were rare (<1.0%), and no SVE Triplets were present. Isolated VEs were rare (<1.0%), and no VE Couplets or VE Triplets were present. Ventricular Bigeminy and Trigeminy were present. Last Stress (if available): 7/28/2022  Summary  There is normal isotope uptake at stress and rest. There is no evidence of myocardial ischemia or scar. The LVEF Is normal and the LV wall motion is normal.  This is a low risk scan. Last Cath (if available):    Last TTE/WOLFGANG(if available):7/25/2022  Summary  Left ventricular cavity size is normal. There is mildly increased left ventricular wall thickness. Overall left ventricular systolic function appears normal. Ejection fraction is visually estimated to be 60-65%. No regional wall motion abnormalities are noted. Normal diastolic function. Mild mitral and pulmonic regurgitation. Mild tricuspid regurgitation with an RVSP of 25mmHg. IVC size is normal (<2.1cm) and collapses > 50% with respiration consistent with normal RA pressure (3mmHg). Last CMR  (if available):    Last Coronary Artery Calcium Score: Ankle-brachial index:    Carotid ultrasound screening:    Abdominal aortic aneurysm screening:    Assessment / Plan:       1. Palpitations    2. Atrial fibrillation, unspecified type ([address]    3. Hypertension, unspecified type    4. Bilateral leg edema         No orders of the defined types were placed in this encounter. I have personally reviewed the reports and images of labs, radiological studies, cardiac studies including ECG's and telemetry, current and old medical records. The note was completed using EMR and Dragon dictation system.  Every effort was made to ensure accuracy; however, inadvertent computerized transcription errors may be present. All questions and concerns were addressed to the patient. I would like to thank you for providing me the opportunity to participate in the care of your patient. If you have any questions, please do not hesitate to contact me.      Minnie Henry MD, [place] [address] [address] [address]  Main Office Phone: [phone]  Fax: [phone]

## 2022-10-26 ENCOUNTER — OFFICE VISIT (OUTPATIENT)
Dept: CARDIOLOGY CLINIC | Age: 72
End: 2022-10-26
Payer: MEDICARE

## 2022-10-26 VITALS
HEART RATE: 60 BPM | OXYGEN SATURATION: 97 % | WEIGHT: 183.6 LBS | BODY MASS INDEX: 30.59 KG/M2 | DIASTOLIC BLOOD PRESSURE: 76 MMHG | HEIGHT: 65 IN | SYSTOLIC BLOOD PRESSURE: 124 MMHG

## 2022-10-26 DIAGNOSIS — I10 HYPERTENSION, UNSPECIFIED TYPE: ICD-10-CM

## 2022-10-26 DIAGNOSIS — R00.2 PALPITATIONS: Primary | ICD-10-CM

## 2022-10-26 DIAGNOSIS — I48.91 ATRIAL FIBRILLATION, UNSPECIFIED TYPE (HCC): ICD-10-CM

## 2022-10-26 PROCEDURE — 1036F TOBACCO NON-USER: CPT | Performed by: INTERNAL MEDICINE

## 2022-10-26 PROCEDURE — G8484 FLU IMMUNIZE NO ADMIN: HCPCS | Performed by: INTERNAL MEDICINE

## 2022-10-26 PROCEDURE — 3017F COLORECTAL CA SCREEN DOC REV: CPT | Performed by: INTERNAL MEDICINE

## 2022-10-26 PROCEDURE — G8417 CALC BMI ABV UP PARAM F/U: HCPCS | Performed by: INTERNAL MEDICINE

## 2022-10-26 PROCEDURE — G8399 PT W/DXA RESULTS DOCUMENT: HCPCS | Performed by: INTERNAL MEDICINE

## 2022-10-26 PROCEDURE — 99214 OFFICE O/P EST MOD 30 MIN: CPT | Performed by: INTERNAL MEDICINE

## 2022-10-26 PROCEDURE — 1090F PRES/ABSN URINE INCON ASSESS: CPT | Performed by: INTERNAL MEDICINE

## 2022-10-26 PROCEDURE — 3078F DIAST BP <80 MM HG: CPT | Performed by: INTERNAL MEDICINE

## 2022-10-26 PROCEDURE — 3074F SYST BP LT 130 MM HG: CPT | Performed by: INTERNAL MEDICINE

## 2022-10-26 PROCEDURE — G8427 DOCREV CUR MEDS BY ELIG CLIN: HCPCS | Performed by: INTERNAL MEDICINE

## 2022-10-26 PROCEDURE — 1123F ACP DISCUSS/DSCN MKR DOCD: CPT | Performed by: INTERNAL MEDICINE

## 2022-10-26 NOTE — PROGRESS NOTES
[facility]     Outpatient Cardiology         Patient Name:  Jose Juan Sheets  Requesting Physician: OMARI Ventura CNP  Primary Care Physician: OMARI Ventura CNP    Reason for Consultation/Chief Complaint:   Chief Complaint   Patient presents with    Follow-up             HPI:     80 y.o. female with hypertension, palpitations, atrial fibrillation and extensive family history of cardiac problems. She presents today for follow up. She reports feeling rapid palpitations, particularly with walking long distances or with exertion. Reports her HR gets up to 120's and can last up to 4 hrs. She uses [employer] and at the time of her rapid palpitations, it records as AF. Tolerating Xarelto without bleeding or side effects. Histories:     Past Medical History:   has a past medical history of Anxiety, Bilateral leg edema, Chest pressure, Dizziness, Herpes, Hypertension, Insomnia, New onset a-fib (HCC), Palpitations, Radial head fracture, closed, left, closed, initial encounter, and Tinnitus. Surgical History:   has a past surgical history that includes Hysterectomy; Breast biopsy; Shoulder arthroscopy (Left); Elbow surgery; [place] tooth extraction; Hysterectomy, total abdominal; Ankle surgery (Left, 01/21/2018); and Colonoscopy (N/A, 12/17/2021). Social History:   reports that she quit smoking about 31 years ago. Her smoking use included cigarettes. She has a 15.00 pack-year smoking history. She has never used smokeless tobacco. She reports current alcohol use. She reports that she does not use drugs. Family History:  No evidence for sudden cardiac death or premature CAD    Medications:     Home Medications:  Were reviewed and are listed in nursing record. and/or listed below    Prior to Admission medications    Medication Sig Start Date End Date Taking?  Authorizing Provider   dextromethorphan-guaiFENesin (Heather Kingsley DM)  MG per extended release tablet Take 1 tablet by mouth every 12 hours as needed   Yes Historical Provider, MD   L-Lysine 1000 MG TABS Take by mouth   Yes Historical Provider, MD   metoprolol succinate (TOPROL XL) 50 MG extended release tablet Take 1 tablet by mouth daily TAKE 1 TABLET TWICE A DAY 9/30/22  Yes OMARI Blank - CNP   omeprazole (PRILOSEC) 20 MG delayed release capsule Take 20 mg by mouth daily   Yes Historical Provider, MD   rivaroxaban (XARELTO) 20 MG TABS tablet Take 1 tablet by mouth daily (with breakfast) 9/20/22  Yes Carlton Gonzales MD   clotrimazole-betamethasone (Arnaldo Loll) 1-0.05 % cream  7/12/22  Yes Historical Provider, MD   ketoconazole (NIZORAL) 2 % cream  7/12/22  Yes Historical Provider, MD   alclomethasone (ACLOVATE) 0.05 % cream Apply topically 2 times daily Apply topically 2 times daily. Yes Historical Provider, MD   losartan (COZAAR) 50 MG tablet Take 1 tablet by mouth in the morning.  8/9/22  Yes Carlton Gonzales MD   triamterene-hydroCHLOROthiazide (MAXZIDE) 75-50 MG per tablet TAKE 0.5 TABLET EVERY DAY 7/20/22  Yes Brice Macario MD   valACYclovir (VALTREX) 1 g tablet TAKE 2 TABLETS AT  ONSET OF COLD SORE, REPEAT  12 HOURS LATER  Patient taking differently: as needed TAKE 2 TABLETS AT  ONSET OF COLD SORE, REPEAT  12 HOURS LATER 3/5/22  Yes Brando Stone DO   guaiFENesin (MUCINEX) 600 MG extended release tablet Take 1,200 mg by mouth 2 times daily  Patient not taking: Reported on 10/26/2022    Historical Provider, MD        Allergy:     Lisinopril     Review of Systems:       Physical Examination:     Vitals:    10/26/22 1245   BP: 124/76   Pulse: 60   SpO2: 97%   Weight: 183 lb 9.6 oz (83.3 kg)   Height: 5' 5\" (1.651 m)           Wt Readings from Last 3 Encounters:   10/26/22 183 lb 9.6 oz (83.3 kg)   10/18/22 181 lb (82.1 kg)   09/30/22 180 lb 12.8 oz (82 kg)              Labs:     Lab Results   Component Value Date    WBC 9.3 09/20/2022    HGB 15.6 09/20/2022    HCT 47.4 09/20/2022 MCV 83.2 09/20/2022     09/20/2022     Lab Results   Component Value Date     09/27/2022     09/27/2022    K 3.9 09/27/2022    K 3.8 09/27/2022     09/27/2022    CL 99 09/27/2022    CO2 29 09/27/2022    CO2 29 09/27/2022    BUN 20 09/27/2022    BUN 21 (H) 09/27/2022    CREATININE 0.8 09/27/2022    CREATININE 0.8 09/27/2022    GLUCOSE 101 (H) 09/27/2022    GLUCOSE 100 (H) 09/27/2022    CALCIUM 9.4 09/27/2022    CALCIUM 9.5 09/27/2022    PROT 6.3 (L) 09/27/2022    LABALBU 4.2 09/27/2022    BILITOT 0.3 09/27/2022    ALKPHOS 86 09/27/2022    AST 17 09/27/2022    ALT 15 09/27/2022    LABGLOM >60 09/27/2022    LABGLOM >60 09/27/2022    GFRAA >60 09/27/2022    GFRAA >60 09/27/2022    AGRATIO 2.0 09/27/2022    GLOB 2.4 12/22/2020         Lab Results   Component Value Date    CHOL 179 12/22/2020    CHOL 199 03/02/2019    CHOL 178 05/18/2018     Lab Results   Component Value Date    TRIG 146 12/22/2020    TRIG 215 (H) 03/02/2019    TRIG 121 05/18/2018     Lab Results   Component Value Date    HDL 57 05/26/2022    HDL 44 12/22/2020    HDL 49 03/02/2019     Lab Results   Component Value Date    LDLCALC 94 05/26/2022    LDLCALC 106 (H) 12/22/2020    LDLCALC 107 (H) 03/02/2019     Lab Results   Component Value Date    LABVLDL 32 05/26/2022    LABVLDL 29 12/22/2020    LABVLDL 43 03/02/2019     No results found for: [facility]    Lab Results   Component Value Date    INR 0.96 07/20/2022    INR 1.04 01/20/2018    PROTIME 12.7 07/20/2022    PROTIME 11.8 01/20/2018       The 10-year ASCVD risk score (Luke MARTINEZ, et al., 2019) is: 12.8%    Values used to calculate the score:      Age: 71 years      Sex: Female      Is Non- : No      Diabetic: No      Tobacco smoker: No      Systolic Blood Pressure: 437 mmHg      Is BP treated: Yes      HDL Cholesterol: 57 mg/dL      Total Cholesterol: 183 mg/dL      Imaging:       ECG (if available, Personally interpreted):    Last Monitor/Holter (if available):  ZIO Patch   6/28 -7/12/2022  Minimum HR of 41 BPM, maximum HR of 123 BPM, average HR of 67 BPM. Predominant underlying rhythm was SR. Isolated SVEs were rare (<1.0%), SVE Couplets were rare (<1.0%), and no SVE Triplets were present. Isolated VEs were rare (<1.0%), and no VE Couplets or VE Triplets were present. Ventricular Bigeminy and Trigeminy were present. Last Stress (if available): 7/28/2022  Summary  There is normal isotope uptake at stress and rest. There is no evidence of myocardial ischemia or scar. The LVEF Is normal and the LV wall motion is normal.  This is a low risk scan. Last Cath (if available):    Last TTE/WOLFGANG(if available):7/25/2022  Summary  Left ventricular cavity size is normal. There is mildly increased left ventricular wall thickness. Overall left ventricular systolic function appears normal. Ejection fraction is visually estimated to be 60-65%. No regional wall motion abnormalities are noted. Normal diastolic function. Mild mitral and pulmonic regurgitation. Mild tricuspid regurgitation with an RVSP of 25mmHg. IVC size is normal (<2.1cm) and collapses > 50% with respiration consistent with normal RA pressure (3mmHg). Last CMR  (if available):    Last Coronary Artery Calcium Score: Ankle-brachial index:    Carotid ultrasound screening:    Abdominal aortic aneurysm screening:    Assessment / Plan:       1. Palpitations    2. Atrial fibrillation, unspecified type ([address]    3.  Hypertension, unspecified type       AF  Rapid palpitations seem to correlate with AF, per [facility]  We discussed initiation of antiarrhythmic therapy versus ablation  Will refer to EP for AF management  Tolerating Xarelto    HTN  Well controlled  On Toprol 50 mg daily  Resting HR approximately 60bpm    Follow up with me in 6 months    I have personally reviewed the reports and images of labs, radiological studies, cardiac studies including ECG's and telemetry, current and old medical records. The note was completed using EMR and Dragon dictation system. Every effort was made to ensure accuracy; however, inadvertent computerized transcription errors may be present. All questions and concerns were addressed to the patient. I would like to thank you for providing me the opportunity to participate in the care of your patient. If you have any questions, please do not hesitate to contact me. Chema Lancaster MD, [facility] [place]  The [address]  [address] [place] [phone]  Main Office Phone: [phone]  Fax: [phone]    I, Shara Moore RN, am scribing for and in the presence of Dr. Chema Lancaster. 10/26/22 9:15 AM  Shara Moore RN    Physician Attestation:  The scribes documentation has been prepared under my direction and personally reviewed by me in its entirety. I confirm the note above accurately reflects all work, treatment, procedures, and medical decision making performed by me.     Electronically signed by Chema Lancaster MD on 10/26/2022 at 4:40 PM

## 2022-11-04 ENCOUNTER — TELEPHONE (OUTPATIENT)
Dept: CARDIOLOGY CLINIC | Age: 72
End: 2022-11-04

## 2022-11-04 NOTE — TELEPHONE ENCOUNTER
I called pharmacy and gave them a free 30 day xarelto card. I also called and did a prior auth. We will be notified within 72 hr. Patient notified.   PATIENT CAN  HER FREE 30 DAYS OF XARELTO AT 3;00 PM

## 2022-11-04 NOTE — TELEPHONE ENCOUNTER
Patient is calling to say geniekadykary in [facility]     Will not refill Xarelto 20 Mg due to needing a PA    She ran out yesterday and has no more left! Please contact at [phone]    I checked and there are no samples of xarelto 20 in     The drug closet.

## 2022-12-07 NOTE — PROGRESS NOTES
[address]   Cardiac Electrophysiology Consultation   Date: 12/13/2022  Reason for Consultation: AF  Consult Requesting Physician: No att. providers found     Chief Complaint:   Chief Complaint   Patient presents with    New Patient      HPI: Jose Juan Sheets is a 71 y.o. female referred by Dr. Jerald Cook for AF. PMH significant for HTN, palpitations with negative 14 day monitor (6/2022), and AF RVR found at PCP office (7/2022) after p/w palpitations, chest discomfort, and dizziness, spontaneous conversion to SR in ED, placed on Eliquis and Toprol increased. MPI and echo were both normal. She continues to have episodes of symptomatic AF, using Kardia to record rhythm. Interval History: Today, she is here for management of AF, presenting in SR. She started taking CBD gummies and denies any recurrence of AF for the past 6 weeks. Review of her Derrel Ro shows no recurrence of AF since 10/14/22. Denies complaints of palpitations, dizziness, CP, SOB, orthopnea, presyncope, or syncope. She does not sleep well, but states that she does not snore. 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 She was originally on Eliquis, but felt poorly on it. She was switched to Xarelto and is tolerating it just fine. Assessment:  PAF, on Xarelto, Toprol, Xarelto  HTN, stable on Toprol, losartan    Plan:  Optimize risk factors for AF including treatment of AMNA, BP control, weight loss, healthy diet, routine exercise, and stress management  Continue Xarelto 20 mg daily, Toprol 50 mg daily  Continue to monitor for symptoms and rhythm with Kardia  Follow up in 6 months with EP NP, sooner if needed    Discussed in detail about the risk factors, pathophysiology, and treatment options including life stye modifications for atrial fibrillation. Keep your blood pressure under control. Keep your blood sugar under control.    Eat heart healthy diet  Minimize alcohol intake  Stop smoking  Be active, keep your body weight optimal  Exercise on a regular basis  Manage your stress   If you snore, get evaluated for sleep apnea  Be aware of the symptoms of stroke    Treatment options including cardioversion, anti-arrhythmic medication therapy, rate control strategy, oral anticoagulation, and atrial fibrillation ablation were discussed with patient. Risks, benefits and alternative of each treatment options were explained. All questions answered. Atrial Fibrillation:    BMI    :   Body mass index is 31.28 kg/m². Duration   :   7/2022    Symptoms   :   Shortness of breath, palpitations, easy fatigability, dyspnea on exertion, decline in his functional capacity    Previous DCCV :  none    Previous AAD             :   none    Beta blocker  :   Toprol    ACE / ARB  :   losartan    OAC   :   Xarelto    LVEF    :   60-65%    Left atrial size  :   3.5 cm    AMNA   :   not tested    Past Medical History:   Diagnosis Date    Anxiety     Bilateral leg edema     Chest pressure     Dizziness     Herpes     opthalmic    Hypertension     Insomnia     New onset a-fib (HCC)     Palpitations     Radial head fracture, closed, left, closed, initial encounter 01/21/2018    Tinnitus         Past Surgical History:   Procedure Laterality Date    ANKLE SURGERY Left 01/21/2018    LEFT ANKLE OPEN REDUCTION INTERNAL FIXATION    BREAST BIOPSY      COLONOSCOPY N/A 12/17/2021    COLONOSCOPY POLYPECTOMY SNARE/COLD BIOPSY performed by Antonio Briscoe MD at [address] [place] [address]      right     HYSTERECTOMY (CERVIX STATUS UNKNOWN)      HYSTERECTOMY, TOTAL ABDOMINAL (CERVIX REMOVED)      SHOULDER ARTHROSCOPY Left     WISDOM TOOTH EXTRACTION         Allergies: Allergies   Allergen Reactions    Lisinopril Cough       Medication:   Prior to Admission medications    Medication Sig Start Date End Date Taking?  Authorizing Provider   omeprazole (PRILOSEC) 20 MG delayed release capsule Take 20 mg by mouth daily   Yes Historical Provider, MD   metoprolol succinate (TOPROL XL) 50 MG extended release tablet Take 50 mg by mouth daily   Yes Historical Provider, MD   triamterene-hydroCHLOROthiazide (MAXZIDE-25) 37.5-25 MG per tablet Take 1 tablet by mouth daily   Yes Historical Provider, MD   clotrimazole-betamethasone (LOTRISONE) 1-0.05 % cream Apply topically 2 times daily Apply topically 2 times daily. Yes Historical Provider, MD   Misc Natural Products (T-RELIEF CBD+13 SL) Place under the tongue   Yes Historical Provider, MD   ELDERBERRY PO Take by mouth   Yes Historical Provider, MD   dextromethorphan-guaiFENesin (MUCINEX DM)  MG per extended release tablet Take 1 tablet by mouth every 12 hours as needed   Yes Historical Provider, MD   L-Lysine 1000 MG TABS Take by mouth   Yes Historical Provider, MD   rivaroxaban (XARELTO) 20 MG TABS tablet Take 1 tablet by mouth daily (with breakfast) 9/20/22  Yes Joshua Henao MD   ketoconazole (NIZORAL) 2 % cream  7/12/22  Yes Historical Provider, MD   alclomethasone (ACLOVATE) 0.05 % cream Apply topically 2 times daily Apply topically 2 times daily. Yes Historical Provider, MD       Social History:   reports that she quit smoking about 31 years ago. Her smoking use included cigarettes. She has a 15.00 pack-year smoking history. She has never used smokeless tobacco. She reports current alcohol use. She reports that she does not use drugs. Family History:  family history includes Arthritis in her father, maternal aunt, and mother; Cancer in her father; Heart Disease in her father, mother, and sister; High Blood Pressure in her brother, brother, and father. Reviewed.  Denies family history of sudden cardiac death, arrhythmia, premature CAD    Review of System:    General ROS: negative for - chills, fever   Psychological ROS: negative for - anxiety or depression  Ophthalmic ROS: negative for - eye pain or loss of vision  ENT ROS: negative for - epistaxis, headaches, nasal discharge, sore throat   Allergy and Immunology ROS: negative for - hives, nasal congestion   Hematological and Lymphatic ROS: negative for - bleeding problems, blood clots, bruising or jaundice  Endocrine ROS: negative for - skin changes, temperature intolerance or unexpected weight changes  Respiratory ROS: negative for - cough, hemoptysis, pleuritic pain, SOB, sputum changes or wheezing  Cardiovascular ROS: Per HPI. Gastrointestinal ROS: negative for - abdominal pain, blood in stools, diarrhea, hematemesis, melena, nausea/vomiting or swallowing difficulty/pain  Genito-Urinary ROS: negative for - dysuria or incontinence  Musculoskeletal ROS: negative for - joint swelling or muscle pain  Neurological ROS: negative for - confusion, dizziness, gait disturbance, headaches, numbness/tingling, seizures, speech problems, tremors, visual changes or weakness  Dermatological ROS: negative for - rash    Physical Examination:  Vitals:    12/13/22 1306   BP: 114/80   Pulse: 79       Constitutional: Oriented. No distress. Head: Normocephalic and atraumatic. Mouth/Throat: Oropharynx is clear and moist.   Eyes: Conjunctivae normal. EOM are normal.   Neck: Normal range of motion. Neck supple. No rigidity. No JVD present. Cardiovascular: Normal rate, regular rhythm, S1&S2 and intact distal pulses. Pulmonary/Chest: Bilateral respiratory sounds. No wheezes. No rhonchi. Abdominal: Soft. Bowel sounds present. No distension, No tenderness. Musculoskeletal: No tenderness. No edema    Lymphadenopathy: Has no cervical adenopathy. Neurological: Alert and oriented. Cranial nerve appears intact, No Gross deficit   Skin: Skin is warm and dry. No rash noted. Psychiatric: Has a normal mood, affect and behavior     Labs:  Reviewed.      ECG: reviewed, Sinus  Rhythm    Studies:   1. 14 day Zio (6/28-7/12/22): SR with average HR 67 (), no significant arrhythmias, symptoms correlating with SR    2. Echo 7/25/22:   Summary   Left ventricular cavity size is normal. There is mildly increased left   ventricular wall thickness. Overall left ventricular systolic function appears normal. Ejection fraction   is visually estimated to be 60-65%. No regional wall motion abnormalities are noted. Normal diastolic function. Mild mitral and pulmonic regurgitation. Mild tricuspid regurgitation with an RVSP of 25mmHg. IVC size is normal (<2.1cm) and collapses > 50% with respiration consistent   with normal RA pressure (3mmHg). 3. Stress Test 7/28/22:     Summary    There is normal isotope uptake at stress and rest. There is no evidence of    myocardial ischemia or scar. The LVEF Is normal and the LV wall motion is normal.        This is a low risk scan. The MCOT, echocardiogram, stress test, and coronary angiography/PCI were reviewed by myself and used for my plan of care. - The patient is counseled to follow a low salt diet to assure blood pressure remains controlled for cardiovascular risk factor modification.   - The patient is counseled to avoid excess caffeine, and energy drinks as this may exacerbated ectopy and arrhythmia. - The patient is counseled to get regular exercise 3-5 times per week to control cardiovascular risk factors. - The patient is counseled to lose weigt to control cardiovascular risk factors. -The patient is counseled about the health hazards of smoking including cardiovascular side effects and its impact on morbidity and mortality. Thank you for allowing me to participate in the care of Miriam Lock. All questions and concerns were addressed to the patient/family. Alternatives to my treatment were discussed.      Jose Finn MD  Cardiac Electrophysiology  [facility]

## 2022-12-13 ENCOUNTER — OFFICE VISIT (OUTPATIENT)
Dept: CARDIOLOGY CLINIC | Age: 72
End: 2022-12-13
Payer: MEDICARE

## 2022-12-13 VITALS
SYSTOLIC BLOOD PRESSURE: 114 MMHG | HEART RATE: 79 BPM | WEIGHT: 188 LBS | BODY MASS INDEX: 31.28 KG/M2 | DIASTOLIC BLOOD PRESSURE: 80 MMHG

## 2022-12-13 DIAGNOSIS — I48.0 PAF (PAROXYSMAL ATRIAL FIBRILLATION) (HCC): Primary | ICD-10-CM

## 2022-12-13 DIAGNOSIS — I10 HYPERTENSION, UNSPECIFIED TYPE: ICD-10-CM

## 2022-12-13 PROCEDURE — G8417 CALC BMI ABV UP PARAM F/U: HCPCS | Performed by: INTERNAL MEDICINE

## 2022-12-13 PROCEDURE — 3074F SYST BP LT 130 MM HG: CPT | Performed by: INTERNAL MEDICINE

## 2022-12-13 PROCEDURE — 3078F DIAST BP <80 MM HG: CPT | Performed by: INTERNAL MEDICINE

## 2022-12-13 PROCEDURE — G8399 PT W/DXA RESULTS DOCUMENT: HCPCS | Performed by: INTERNAL MEDICINE

## 2022-12-13 PROCEDURE — 1123F ACP DISCUSS/DSCN MKR DOCD: CPT | Performed by: INTERNAL MEDICINE

## 2022-12-13 PROCEDURE — 99204 OFFICE O/P NEW MOD 45 MIN: CPT | Performed by: INTERNAL MEDICINE

## 2022-12-13 PROCEDURE — 3017F COLORECTAL CA SCREEN DOC REV: CPT | Performed by: INTERNAL MEDICINE

## 2022-12-13 PROCEDURE — G8484 FLU IMMUNIZE NO ADMIN: HCPCS | Performed by: INTERNAL MEDICINE

## 2022-12-13 PROCEDURE — 93000 ELECTROCARDIOGRAM COMPLETE: CPT | Performed by: INTERNAL MEDICINE

## 2022-12-13 PROCEDURE — 1036F TOBACCO NON-USER: CPT | Performed by: INTERNAL MEDICINE

## 2022-12-13 PROCEDURE — G8427 DOCREV CUR MEDS BY ELIG CLIN: HCPCS | Performed by: INTERNAL MEDICINE

## 2022-12-13 PROCEDURE — 1090F PRES/ABSN URINE INCON ASSESS: CPT | Performed by: INTERNAL MEDICINE

## 2022-12-13 RX ORDER — TRIAMTERENE AND HYDROCHLOROTHIAZIDE 37.5; 25 MG/1; MG/1
1 TABLET ORAL DAILY
COMMUNITY

## 2022-12-13 RX ORDER — CLOTRIMAZOLE AND BETAMETHASONE DIPROPIONATE 10; .64 MG/G; MG/G
CREAM TOPICAL 2 TIMES DAILY
COMMUNITY

## 2022-12-13 RX ORDER — METOPROLOL SUCCINATE 50 MG/1
50 TABLET, EXTENDED RELEASE ORAL DAILY
COMMUNITY

## 2022-12-13 RX ORDER — OMEPRAZOLE 20 MG/1
20 CAPSULE, DELAYED RELEASE ORAL DAILY
COMMUNITY

## 2023-01-19 ENCOUNTER — TELEPHONE (OUTPATIENT)
Dept: CARDIOLOGY CLINIC | Age: 73
End: 2023-01-19

## 2023-01-19 ENCOUNTER — NURSE ONLY (OUTPATIENT)
Dept: CARDIOLOGY CLINIC | Age: 73
End: 2023-01-19
Payer: MEDICARE

## 2023-01-19 VITALS
SYSTOLIC BLOOD PRESSURE: 118 MMHG | HEART RATE: 106 BPM | OXYGEN SATURATION: 95 % | HEIGHT: 65 IN | DIASTOLIC BLOOD PRESSURE: 78 MMHG | BODY MASS INDEX: 31.28 KG/M2

## 2023-01-19 DIAGNOSIS — I48.0 PAROXYSMAL ATRIAL FIBRILLATION (HCC): Primary | ICD-10-CM

## 2023-01-19 DIAGNOSIS — R00.2 PALPITATIONS: ICD-10-CM

## 2023-01-19 DIAGNOSIS — Z79.01 ON CONTINUOUS ORAL ANTICOAGULATION: ICD-10-CM

## 2023-01-19 DIAGNOSIS — I10 ESSENTIAL HYPERTENSION: ICD-10-CM

## 2023-01-19 PROCEDURE — 3078F DIAST BP <80 MM HG: CPT | Performed by: NURSE PRACTITIONER

## 2023-01-19 PROCEDURE — 99215 OFFICE O/P EST HI 40 MIN: CPT | Performed by: NURSE PRACTITIONER

## 2023-01-19 PROCEDURE — 1123F ACP DISCUSS/DSCN MKR DOCD: CPT | Performed by: NURSE PRACTITIONER

## 2023-01-19 PROCEDURE — 3074F SYST BP LT 130 MM HG: CPT | Performed by: NURSE PRACTITIONER

## 2023-01-19 PROCEDURE — 93000 ELECTROCARDIOGRAM COMPLETE: CPT | Performed by: NURSE PRACTITIONER

## 2023-01-19 RX ORDER — METOPROLOL SUCCINATE 50 MG/1
75 TABLET, EXTENDED RELEASE ORAL DAILY
Qty: 45 TABLET | Refills: 5 | Status: SHIPPED | OUTPATIENT
Start: 2023-01-19

## 2023-01-19 NOTE — PROGRESS NOTES
[facility]   Electrophysiology  Office Visit  Date: 1/19/2023    Chief Complaint   Patient presents with    Palpitations    Atrial Fibrillation    Hypertension       Cardiac HX: Mitra Ornelas is a 72 y.o. woman with a h/o HTN, HLD, palpitations, pAF/RVR found at PCP office (7/2022) after p/w palpitations, chest discomfort and dizziness, spontaneous conversion to SR in ED, placed on Eliquis, Toprol increased, MPI/echo WNL, recurrent AF.    Interval History/HPI: She is here for follow-up of her paroxysmal atrial fibrillation.  Patient is here with complaints of recurrent AF starting on 1/17/2023.  Patient had originally been referred to EP by Dr. Duke for atrial fibrillation.  Patient with a longstanding history of palpitations.  She had worn an outpatient 14-day monitor in June 2022 that showed no evidence of atrial fibrillation.  She then started complaining of palpitations, chest discomfort and dizziness and presented to her PCP office and was found to be in A. fib with RVR.  She was sent to the emergency room however she had a spontaneous conversion to normal sinus rhythm.  She was placed on Eliquis.  She had been on Toprol-XL and this was increased.  Patient had seen Dr. Crane in mid December and was having minimal breakthrough of her atrial fibrillation.  She does have a [employer] and review of this today shows episodes of atrial fibrillation with heart rates up to the 120s.  She had been taking CBD Gummies and was having no episodes of her atrial fibrillation.  The only change that she has taken is that she is no longer doing the Gummies.  She states her episode of atrial fibrillation started on 117.  She has been going in and out of rhythm since then.  She does feel heart racing and palpitations.  It does make her kind of weak and fatigued.  She is on Xarelto and has not missed any doses.  She denies any bleeding or dark tarry stools.  She denies any chest pain, shortness of breath, PND,  orthopnea or lower extremity edema. Her blood pressure is well controlled in the office today. Home medications:   Current Outpatient Medications on File Prior to Visit   Medication Sig Dispense Refill    omeprazole (PRILOSEC) 20 MG delayed release capsule Take 20 mg by mouth daily      triamterene-hydroCHLOROthiazide (MAXZIDE-25) 37.5-25 MG per tablet Take 1 tablet by mouth daily      clotrimazole-betamethasone (LOTRISONE) 1-0.05 % cream Apply topically 2 times daily Apply topically 2 times daily. [employer] Natural Products (T-RELIEF CBD+13 SL) Place under the tongue      ELDERBERRY PO Take by mouth      dextromethorphan-guaiFENesin (MUCINEX DM)  MG per extended release tablet Take 1 tablet by mouth every 12 hours as needed      L-Lysine 1000 MG TABS Take by mouth      rivaroxaban (XARELTO) 20 MG TABS tablet Take 1 tablet by mouth daily (with breakfast) 30 tablet 11    ketoconazole (NIZORAL) 2 % cream       alclomethasone (ACLOVATE) 0.05 % cream Apply topically 2 times daily Apply topically 2 times daily. No current facility-administered medications on file prior to visit.        Past Medical History:   Diagnosis Date    Anxiety     Bilateral leg edema     Chest pressure     Dizziness     Herpes     opthalmic    Hypertension     Insomnia     New onset a-fib (HCC)     Palpitations     Radial head fracture, closed, left, closed, initial encounter 01/21/2018    Tinnitus         Past Surgical History:   Procedure Laterality Date    ANKLE SURGERY Left 01/21/2018    LEFT ANKLE OPEN REDUCTION INTERNAL FIXATION    BREAST BIOPSY      COLONOSCOPY N/A 12/17/2021    COLONOSCOPY POLYPECTOMY SNARE/COLD BIOPSY performed by Jass Moss MD at [address] [place] [address]      right     HYSTERECTOMY (CERVIX STATUS UNKNOWN)      HYSTERECTOMY, TOTAL ABDOMINAL (CERVIX REMOVED)      SHOULDER ARTHROSCOPY Left     WISDOM TOOTH EXTRACTION         Allergies   Allergen Reactions    Lisinopril Cough       Social History:  Reviewed. reports that she quit smoking about 31 years ago. Her smoking use included cigarettes. She has a 15.00 pack-year smoking history. She has never used smokeless tobacco. She reports current alcohol use. She reports that she does not use drugs. Family History:  Reviewed. family history includes Arthritis in her father, maternal aunt, and mother; Cancer in her father; Heart Disease in her father, mother, and sister; High Blood Pressure in her brother, brother, and father. Review of System:    Constitutional: No fevers, chills. Eyes: No visual changes or diplopia. No scleral icterus. ENT: No Headaches. No mouth sores or sore throat. Cardiovascular: No for chest pain, No for dyspnea on exertion, Yes for palpitations or No for loss of consciousness. No cough, hemoptysis, No for pleuritic pain, or phlebitis. Respiratory: No for cough or wheezing. No hematemesis. Gastrointestinal: No abdominal pain, blood in stools. Genitourinary: No dysuria, or hematuria. Musculoskeletal: No gait disturbance,    Integumentary: No rash or pruritis. Neurological: No headache, change in muscle strength, numbness or tingling. Psychiatric: No anxiety, or depression. Endocrine: No temperature intolerance. No excessive thirst, fluid intake, or urination. Hem/Lymph: No abnormal bruising or bleeding, blood clots or swollen lymph nodes. Allergic/Immunologic: No nasal congestion or hives. Physical Examination:  Vitals:    01/19/23 1428   BP: 118/78   Pulse: (!) 106   SpO2: 95%         Wt Readings from Last 3 Encounters:   12/13/22 188 lb (85.3 kg)   10/26/22 183 lb 9.6 oz (83.3 kg)   10/18/22 181 lb (82.1 kg)       Constitutional: Oriented. No distress. Head: Normocephalic and atraumatic. Mouth/Throat: Oropharynx is clear and moist.   Eyes: Conjunctivae clear without jaunduice. PERRL. Neck: Neck supple. No rigidity. No JVD present. Cardiovascular: Normal rate, regular rhythm, S1&S2. Pulmonary/Chest: Bilateral respiratory sounds. No wheezes, No rhonchi. Abdominal: Soft. Bowel sounds present. No distension, No tenderness. Musculoskeletal: No tenderness. No edema    Lymphadenopathy: Has no cervical adenopathy. Neurological: Alert and oriented. Cranial nerve appears intact, No Gross deficit   Skin: Skin is warm and dry. No rash noted. Psychiatric: Has a normal mood, affect and behavior     Labs:  Reviewed. No results for input(s): NA, K, CL, CO2, PHOS, BUN, CREATININE, CA in the last 72 hours. Invalid input(s):  TSH  No results for input(s): WBC, HGB, HCT, MCV, PLT in the last 72 hours. Lab Results   Component Value Date/Time    TROPONINI <0.01 09/20/2022 09:24 AM     No results found for: BNP  Lab Results   Component Value Date/Time    PROTIME 12.7 07/20/2022 01:12 PM    PROTIME 11.8 01/20/2018 10:37 PM    INR 0.96 07/20/2022 01:12 PM    INR 1.04 01/20/2018 10:37 PM     Lab Results   Component Value Date/Time    CHOL 179 12/22/2020 11:14 AM    HDL 57 05/26/2022 02:58 PM    HDL 51 08/26/2011 01:54 PM    TRIG 146 12/22/2020 11:14 AM       ECG: Personally reviewed: AF/AFL, , QRS 94, QTc 413    ECHO:  7.25.2022  Summary   Left ventricular cavity size is normal. There is mildly increased left   ventricular wall thickness. Overall left ventricular systolic function appears normal. Ejection fraction   is visually estimated to be 60-65%. No regional wall motion abnormalities are noted. Normal diastolic function. Mild mitral and pulmonic regurgitation. Mild tricuspid regurgitation with an RVSP of 25mmHg. IVC size is normal (<2.1cm) and collapses > 50% with respiration consistent   with normal RA pressure (3mmHg). Stress Test: 7.28.2022   Summary    There is normal isotope uptake at stress and rest. There is no evidence of    myocardial ischemia or scar. The LVEF Is normal and the LV wall motion is normal.        This is a low risk scan.      Cardiac Angiography:n/a    Problem List:   Patient Active Problem List    Diagnosis Date Noted    Acute bacterial sinusitis 10/18/2022    New onset a-fib ([facility] [address] 07/20/2022    Atrial fibrillation ([facility] [address] 07/20/2022    Palpitations 10/61/5028    Lichen planus 04/31/9199    Pre-diabetes 05/17/2018    Mild single current episode of major depressive disorder ([facility] [address] 02/07/2018    Essential hypertension 04/10/2017    Mixed hyperlipidemia 04/10/2017    Vitamin B 12 deficiency 04/10/2017    Burning mouth syndrome 09/17/2645    Oral lichen planus 68/85/9256    Atopic dermatitis 08/26/2011    Vitamin D deficiency 08/26/2011    Depression 07/27/2010    Menopausal syndrome 07/27/2010    Generalized osteoarthrosis, involving multiple sites 07/27/2010    Anxiety 07/27/2010    Insomnia 07/27/2010    Colonic polyp 07/27/2010    Herpes simplex infection 07/27/2010        Assessment:   1. Paroxysmal atrial fibrillation (HCC)    2. On continuous oral anticoagulation    3. Palpitations    4. Essential hypertension      Cardiac HX: Itzel Hubbard is a 71 y.o. woman with a h/o HTN, HLD, palpitations, pAF/RVR found at PCP office (7/2022) after p/w palpitations, chest discomfort and dizziness, spontaneous conversion to SR in ED, placed on Eliquis, Toprol increased, MPI/echo WNL, recurrent AF.  [id number] 3. TSH 1.4 (9.27.22).      pAF  - In AF -   - Review of [address] [facility] [address] shows episodes of atrial fibrillation with RVR and rates up to 122 bpm  - On Xarelto 20 mg QD - no s/s bleeding - continue, did not tolerate Eliquis, has not missed any doses  - On Toprol XL 50 mg QD - will have take 1.5 tablets for better HR control  - 1 week CAM  - Will review with Dr. Mahendra Krishnamurthy re: possible starting flecainide  - Echo - LA 3.5/47.2, septal size 1.01  - Reviewed risk factors, pathophysiology, treatment options and lifestyle modification for atrial fibrillation: Blood pressure control, blood sugar control, healthy diet, minimal alcohol intake, no smoking, activity and exercise, manage stress sleep apnea evaluation and symptoms of a stroke. - Reviewed recent labs  - Sleep study  - ECG ordered and results personally reviewed     HTN  - BP well controlled  - On Maxide 25, Toprol-XL 50 mg daily    EF of 60 to 77%  No systolic HF  No known CAD  Anticoagulation for AF   No Tobacco use. All questions and concerns were addressed to the patient/family. Alternatives to my treatment were discussed. The note was completed using EMR. Every effort was made to ensure accuracy; however, inadvertent computerized transcription errors may be present. Patient received education regarding their diagnosis, treatment and medications while in the office today. Susie Craven CNP  [address]    I  have spent 40 minutes in care of the patient including direct face to face time, chart preparation, reviewing diagnostic testing, other provider notes and coordinating patient care.

## 2023-01-19 NOTE — LETTER
[address] [facility] [address]  Phone: [phone]  Fax: [phone]    OMARI Hopson CNP    January 19, 2023     OMARI Whitaker CNP  [id number] [facility] [address] [facility] [address]    Patient: Belén Real   MR Number: [id number]   YOB: 1950   Date of Visit: 1/19/2023       Dear Paulino Bradley: Thank you for referring Yelena Salas to me for evaluation/treatment. Below are the relevant portions of my assessment and plan of care. If you have questions, please do not hesitate to call me. I look forward to following Bea Fleming along with you.     Sincerely,      OMARI Hopson CNP

## 2023-01-19 NOTE — TELEPHONE ENCOUNTER
The patient called stating she's had Afib symptoms for the past 2 days. The patient was advised to come in for an EKG. The patient can be reached at [phone] with any further questions.

## 2023-01-24 ENCOUNTER — TELEPHONE (OUTPATIENT)
Dept: CARDIOLOGY CLINIC | Age: 73
End: 2023-01-24

## 2023-01-24 DIAGNOSIS — I48.0 PAROXYSMAL ATRIAL FIBRILLATION (HCC): Primary | ICD-10-CM

## 2023-01-24 RX ORDER — FLECAINIDE ACETATE 100 MG/1
100 TABLET ORAL 2 TIMES DAILY
Qty: 60 TABLET | Refills: 5 | Status: SHIPPED | OUTPATIENT
Start: 2023-01-24

## 2023-01-24 NOTE — TELEPHONE ENCOUNTER
Called and spoke with patient. I reviewed with Dr. Tammy Andino. We will place patient on flecainide 100 mg twice a day. A prescription was sent into the pharmacy. Patient is going to come in a week and have an EKG done after starting the flecainide. She was also advised that there is some Xarelto samples at the  for her. She is going to drop off her 2-week monitor on Thursday. He feels that she is back in regular rhythm. She is having episodes of hot flashes and she is wondering if that is related to the atrial fibrillation.

## 2023-02-03 ENCOUNTER — NURSE ONLY (OUTPATIENT)
Dept: CARDIOLOGY CLINIC | Age: 73
End: 2023-02-03

## 2023-02-03 NOTE — PROGRESS NOTES
Completed an EKG on pt. Started on Flecainide. Per Dr. Olamide Davis  EKG was ok and pt was ok to go home.

## 2023-02-06 ENCOUNTER — OFFICE VISIT (OUTPATIENT)
Dept: FAMILY MEDICINE CLINIC | Age: 73
End: 2023-02-06
Payer: MEDICARE

## 2023-02-06 VITALS
HEART RATE: 68 BPM | OXYGEN SATURATION: 98 % | DIASTOLIC BLOOD PRESSURE: 86 MMHG | SYSTOLIC BLOOD PRESSURE: 132 MMHG | BODY MASS INDEX: 31.99 KG/M2 | HEIGHT: 65 IN | TEMPERATURE: 98.3 F | WEIGHT: 192 LBS

## 2023-02-06 DIAGNOSIS — F32.0 MILD SINGLE CURRENT EPISODE OF MAJOR DEPRESSIVE DISORDER (HCC): ICD-10-CM

## 2023-02-06 DIAGNOSIS — I48.0 PAROXYSMAL ATRIAL FIBRILLATION (HCC): ICD-10-CM

## 2023-02-06 DIAGNOSIS — M54.32 SCIATIC NERVE PAIN, LEFT: Primary | ICD-10-CM

## 2023-02-06 PROBLEM — I48.91 NEW ONSET A-FIB (HCC): Status: RESOLVED | Noted: 2022-07-20 | Resolved: 2023-02-06

## 2023-02-06 PROCEDURE — G8427 DOCREV CUR MEDS BY ELIG CLIN: HCPCS | Performed by: NURSE PRACTITIONER

## 2023-02-06 PROCEDURE — G8417 CALC BMI ABV UP PARAM F/U: HCPCS | Performed by: NURSE PRACTITIONER

## 2023-02-06 PROCEDURE — G8399 PT W/DXA RESULTS DOCUMENT: HCPCS | Performed by: NURSE PRACTITIONER

## 2023-02-06 PROCEDURE — 3079F DIAST BP 80-89 MM HG: CPT | Performed by: NURSE PRACTITIONER

## 2023-02-06 PROCEDURE — 3017F COLORECTAL CA SCREEN DOC REV: CPT | Performed by: NURSE PRACTITIONER

## 2023-02-06 PROCEDURE — 1036F TOBACCO NON-USER: CPT | Performed by: NURSE PRACTITIONER

## 2023-02-06 PROCEDURE — 3075F SYST BP GE 130 - 139MM HG: CPT | Performed by: NURSE PRACTITIONER

## 2023-02-06 PROCEDURE — 99214 OFFICE O/P EST MOD 30 MIN: CPT | Performed by: NURSE PRACTITIONER

## 2023-02-06 PROCEDURE — G8484 FLU IMMUNIZE NO ADMIN: HCPCS | Performed by: NURSE PRACTITIONER

## 2023-02-06 PROCEDURE — 1090F PRES/ABSN URINE INCON ASSESS: CPT | Performed by: NURSE PRACTITIONER

## 2023-02-06 PROCEDURE — 1123F ACP DISCUSS/DSCN MKR DOCD: CPT | Performed by: NURSE PRACTITIONER

## 2023-02-06 RX ORDER — PREDNISONE 20 MG/1
20 TABLET ORAL DAILY
Qty: 10 TABLET | Refills: 0 | Status: SHIPPED | OUTPATIENT
Start: 2023-02-06 | End: 2023-02-16

## 2023-02-06 ASSESSMENT — PATIENT HEALTH QUESTIONNAIRE - PHQ9
4. FEELING TIRED OR HAVING LITTLE ENERGY: 1
9. THOUGHTS THAT YOU WOULD BE BETTER OFF DEAD, OR OF HURTING YOURSELF: 0
3. TROUBLE FALLING OR STAYING ASLEEP: 1
7. TROUBLE CONCENTRATING ON THINGS, SUCH AS READING THE NEWSPAPER OR WATCHING TELEVISION: 0
SUM OF ALL RESPONSES TO PHQ QUESTIONS 1-9: 2
2. FEELING DOWN, DEPRESSED OR HOPELESS: 0
6. FEELING BAD ABOUT YOURSELF - OR THAT YOU ARE A FAILURE OR HAVE LET YOURSELF OR YOUR FAMILY DOWN: 0
10. IF YOU CHECKED OFF ANY PROBLEMS, HOW DIFFICULT HAVE THESE PROBLEMS MADE IT FOR YOU TO DO YOUR WORK, TAKE CARE OF THINGS AT HOME, OR GET ALONG WITH OTHER PEOPLE: 0
1. LITTLE INTEREST OR PLEASURE IN DOING THINGS: 0
SUM OF ALL RESPONSES TO PHQ QUESTIONS 1-9: 2
8. MOVING OR SPEAKING SO SLOWLY THAT OTHER PEOPLE COULD HAVE NOTICED. OR THE OPPOSITE, BEING SO FIGETY OR RESTLESS THAT YOU HAVE BEEN MOVING AROUND A LOT MORE THAN USUAL: 0
SUM OF ALL RESPONSES TO PHQ QUESTIONS 1-9: 2
5. POOR APPETITE OR OVEREATING: 0
SUM OF ALL RESPONSES TO PHQ QUESTIONS 1-9: 2
SUM OF ALL RESPONSES TO PHQ9 QUESTIONS 1 & 2: 0

## 2023-02-06 ASSESSMENT — ENCOUNTER SYMPTOMS
WHEEZING: 0
SINUS PRESSURE: 0
VOMITING: 0
NAUSEA: 0
CONSTIPATION: 0
EYE ITCHING: 0
BACK PAIN: 0
BLOOD IN STOOL: 0
EYE REDNESS: 0
CHEST TIGHTNESS: 0
COUGH: 0
SHORTNESS OF BREATH: 0
RHINORRHEA: 0
SORE THROAT: 0
DIARRHEA: 0
ABDOMINAL PAIN: 0
COLOR CHANGE: 0

## 2023-02-06 NOTE — PROGRESS NOTES
Chani Hernandez (:  1950) is a 71 y.o. female,Established patient, here for evaluation of the following chief complaint(s):  Leg Pain (Left leg pain, disrupts sleep, HCC gaps)      ASSESSMENT/PLAN:  1. Sciatic nerve pain, left  Assessment & Plan:  Patient most likely experiencing sciatic nerve pain with increased inflammation. Patient cannot have anti-inflammatories due to being on xarelto. Will send in 10 days of prednisone with hopes this will improve symptoms. Patient agrees with plan. 2. Paroxysmal atrial fibrillation ([address]  Assessment & Plan:   Monitored by specialist- no acute findings meriting change in the plan  3. Mild single current episode of major depressive disorder [facility])  Assessment & Plan:   Stable, controlled  No changes  Continue current treatment plan       No follow-ups on file. SUBJECTIVE/OBJECTIVE:  Patient presents with new onset left leg pain for the past two days. She states the pain is worse at night when sleeping, relieved with walking. She has tried stretching as well as tylenol with no relief. Denies any right sided pain. She also has concerns for chronic sweating, states it happens all day and she feels hot almost every day. Previous lab work WNL. She has mild difficulty in sleeping, she has questions today regarding medical marijuana gummies for sleep. Denies any previous use.     Current Outpatient Medications   Medication Sig Dispense Refill    predniSONE (DELTASONE) 20 MG tablet Take 1 tablet by mouth daily for 10 days 10 tablet 0    rivaroxaban (XARELTO) 20 MG TABS tablet Take 1 tablet by mouth daily (with breakfast) 14 tablet 0    flecainide (TAMBOCOR) 100 MG tablet Take 1 tablet by mouth 2 times daily 60 tablet 5    metoprolol succinate (TOPROL XL) 50 MG extended release tablet Take 1.5 tablets by mouth daily 45 tablet 5    omeprazole (PRILOSEC) 20 MG delayed release capsule Take 20 mg by mouth daily      triamterene-hydroCHLOROthiazide (MAXZIDE-25) 37.5-25 MG per tablet Take 1 tablet by mouth daily      clotrimazole-betamethasone (LOTRISONE) 1-0.05 % cream Apply topically 2 times daily Apply topically 2 times daily. [employer] Natural Products (T-RELIEF CBD+13 SL) Place under the tongue      ELDERBERRY PO Take by mouth      dextromethorphan-guaiFENesin (MUCINEX DM)  MG per extended release tablet Take 1 tablet by mouth every 12 hours as needed      L-Lysine 1000 MG TABS Take by mouth      ketoconazole (NIZORAL) 2 % cream       alclomethasone (ACLOVATE) 0.05 % cream Apply topically 2 times daily Apply topically 2 times daily. No current facility-administered medications for this visit. Review of Systems   Constitutional:  Negative for chills, fatigue and fever. HENT:  Negative for congestion, ear pain, postnasal drip, rhinorrhea, sinus pressure, sneezing and sore throat. Eyes:  Negative for redness and itching. Respiratory:  Negative for cough, chest tightness, shortness of breath and wheezing. Cardiovascular:  Negative for chest pain and palpitations. Gastrointestinal:  Negative for abdominal pain, blood in stool, constipation, diarrhea, nausea and vomiting. Endocrine: Negative for cold intolerance and heat intolerance. Genitourinary:  Negative for difficulty urinating, dysuria, flank pain, frequency, hematuria and urgency. Musculoskeletal:  Positive for myalgias. Negative for arthralgias, back pain and joint swelling. Skin:  Negative for color change, pallor, rash and wound. Allergic/Immunologic: Negative for environmental allergies and food allergies. Neurological:  Negative for dizziness, seizures, syncope, weakness, light-headedness, numbness and headaches. Hematological:  Negative for adenopathy. Does not bruise/bleed easily. Psychiatric/Behavioral:  Positive for sleep disturbance. Negative for confusion and suicidal ideas. The patient is not nervous/anxious and is not hyperactive.       Vitals:    02/06/23 1259   BP: 132/86 Site: Left Upper Arm   Position: Sitting   Cuff Size: Large Adult   Pulse: 68   Temp: 98.3 °F (36.8 °C)   SpO2: 98%   Weight: 192 lb (87.1 kg)   Height: 5' 5\" (1.651 m)       Physical Exam  Vitals reviewed. Constitutional:       Appearance: Normal appearance. HENT:      Head: Normocephalic and atraumatic. Nose: Nose normal.   Eyes:      Extraocular Movements: Extraocular movements intact. Conjunctiva/sclera: Conjunctivae normal.      Pupils: Pupils are equal, round, and reactive to light. Cardiovascular:      Rate and Rhythm: Normal rate and regular rhythm. Pulses: Normal pulses. Heart sounds: Normal heart sounds. Pulmonary:      Effort: Pulmonary effort is normal.      Breath sounds: Normal breath sounds. Musculoskeletal:         General: No swelling. Normal range of motion. Cervical back: Normal range of motion. Skin:     General: Skin is warm. Capillary Refill: Capillary refill takes less than 2 seconds. Coloration: Skin is not jaundiced or pale. Findings: No bruising, erythema, lesion or rash. Neurological:      Mental Status: She is alert and oriented to person, place, and time. Mental status is at baseline. Psychiatric:         Mood and Affect: Mood normal.         Behavior: Behavior normal.         Thought Content: Thought content normal.         Judgment: Judgment normal.               An electronic signature was used to authenticate this note.     --OMARI Rivas - CNP

## 2023-02-06 NOTE — ASSESSMENT & PLAN NOTE
Patient most likely experiencing sciatic nerve pain with increased inflammation. Patient cannot have anti-inflammatories due to being on xarelto. Will send in 10 days of prednisone with hopes this will improve symptoms. Patient agrees with plan.

## 2023-02-13 ENCOUNTER — HOSPITAL ENCOUNTER (OUTPATIENT)
Dept: NON INVASIVE DIAGNOSTICS | Age: 73
Discharge: HOME | End: 2023-02-13
Payer: MEDICARE

## 2023-02-13 ENCOUNTER — TELEPHONE (OUTPATIENT)
Dept: FAMILY MEDICINE CLINIC | Age: 73
End: 2023-02-13

## 2023-02-13 DIAGNOSIS — I48.91 ATRIAL FIBRILLATION, UNSPECIFIED TYPE (HCC): Primary | ICD-10-CM

## 2023-02-13 LAB
LV EF: 63 %
LVEF MODALITY: NORMAL

## 2023-02-13 PROCEDURE — 93306 TTE W/DOPPLER COMPLETE: CPT

## 2023-02-13 RX ORDER — METHYLPREDNISOLONE 4 MG/1
TABLET ORAL
Qty: 1 KIT | Refills: 0 | Status: SHIPPED | OUTPATIENT
Start: 2023-02-13 | End: 2023-02-19

## 2023-02-13 NOTE — TELEPHONE ENCOUNTER
Pt's sciatica is flaring up, she is in severe pain and does not know what to do. Please reach out to pt.

## 2023-02-13 NOTE — TELEPHONE ENCOUNTER
What has she taken so far?     Recommend ibuprofen 800 mg tid  Tylenol 1000 mg between  Heat  Stretching

## 2023-02-20 RX ORDER — METHYLPREDNISOLONE 4 MG/1
TABLET ORAL
Qty: 1 KIT | Refills: 0 | Status: SHIPPED | OUTPATIENT
Start: 2023-02-20 | End: 2023-02-24 | Stop reason: SDUPTHER

## 2023-02-24 RX ORDER — METHYLPREDNISOLONE 4 MG/1
TABLET ORAL
Qty: 1 KIT | Refills: 0 | Status: SHIPPED | OUTPATIENT
Start: 2023-02-24 | End: 2023-03-01

## 2023-02-27 ENCOUNTER — OFFICE VISIT (OUTPATIENT)
Dept: FAMILY MEDICINE CLINIC | Age: 73
End: 2023-02-27
Payer: MEDICARE

## 2023-02-27 ENCOUNTER — HOSPITAL ENCOUNTER (OUTPATIENT)
Dept: GENERAL RADIOLOGY | Age: 73
Discharge: HOME | End: 2023-02-27
Payer: MEDICARE

## 2023-02-27 VITALS
OXYGEN SATURATION: 97 % | HEART RATE: 54 BPM | WEIGHT: 190 LBS | TEMPERATURE: 97.7 F | BODY MASS INDEX: 31.62 KG/M2 | SYSTOLIC BLOOD PRESSURE: 110 MMHG | DIASTOLIC BLOOD PRESSURE: 78 MMHG

## 2023-02-27 DIAGNOSIS — M54.32 SCIATICA OF LEFT SIDE: Primary | ICD-10-CM

## 2023-02-27 DIAGNOSIS — M54.32 SCIATICA OF LEFT SIDE: ICD-10-CM

## 2023-02-27 PROCEDURE — G8399 PT W/DXA RESULTS DOCUMENT: HCPCS | Performed by: NURSE PRACTITIONER

## 2023-02-27 PROCEDURE — 72100 X-RAY EXAM L-S SPINE 2/3 VWS: CPT

## 2023-02-27 PROCEDURE — 3017F COLORECTAL CA SCREEN DOC REV: CPT | Performed by: NURSE PRACTITIONER

## 2023-02-27 PROCEDURE — 3074F SYST BP LT 130 MM HG: CPT | Performed by: NURSE PRACTITIONER

## 2023-02-27 PROCEDURE — G8427 DOCREV CUR MEDS BY ELIG CLIN: HCPCS | Performed by: NURSE PRACTITIONER

## 2023-02-27 PROCEDURE — 1090F PRES/ABSN URINE INCON ASSESS: CPT | Performed by: NURSE PRACTITIONER

## 2023-02-27 PROCEDURE — 3078F DIAST BP <80 MM HG: CPT | Performed by: NURSE PRACTITIONER

## 2023-02-27 PROCEDURE — G8484 FLU IMMUNIZE NO ADMIN: HCPCS | Performed by: NURSE PRACTITIONER

## 2023-02-27 PROCEDURE — 99214 OFFICE O/P EST MOD 30 MIN: CPT | Performed by: NURSE PRACTITIONER

## 2023-02-27 PROCEDURE — 1123F ACP DISCUSS/DSCN MKR DOCD: CPT | Performed by: NURSE PRACTITIONER

## 2023-02-27 PROCEDURE — 1036F TOBACCO NON-USER: CPT | Performed by: NURSE PRACTITIONER

## 2023-02-27 PROCEDURE — G8417 CALC BMI ABV UP PARAM F/U: HCPCS | Performed by: NURSE PRACTITIONER

## 2023-02-27 RX ORDER — HYDROCODONE BITARTRATE AND ACETAMINOPHEN 5; 325 MG/1; MG/1
1 TABLET ORAL EVERY 4 HOURS PRN
Qty: 30 TABLET | Refills: 0 | Status: SHIPPED | OUTPATIENT
Start: 2023-02-27 | End: 2023-03-04

## 2023-02-27 ASSESSMENT — ENCOUNTER SYMPTOMS
NAUSEA: 0
SHORTNESS OF BREATH: 0
SINUS PRESSURE: 0
BACK PAIN: 0
COUGH: 0
CONSTIPATION: 0
COLOR CHANGE: 0
VOMITING: 0
CHEST TIGHTNESS: 0
ABDOMINAL PAIN: 0
RHINORRHEA: 0
EYE REDNESS: 0
DIARRHEA: 0
BLOOD IN STOOL: 0
WHEEZING: 0
EYE ITCHING: 0
SORE THROAT: 0

## 2023-02-27 NOTE — PROGRESS NOTES
Jerome Winston (:  1950) is a 71 y.o. female,Established patient, here for evaluation of the following chief complaint(s):  Back Pain      ASSESSMENT/PLAN:  1. Sciatica of left side  Assessment & Plan:  Patient continues to experience left sided sciatic pain even after multiple rounds of steroids. She states pain has mildly improved but pain has not fully resolved. Based on continuing pain, will order lumbar x-ray to rule out other causes of inflammation. In addition, will send 5 days of norco to help alleviate pain while sleeping. Will follow up with x-ray results today. Patient agrees with plan. Orders:  -     XR LUMBAR SPINE (2-3 VIEWS); Future  -     HYDROcodone-acetaminophen (NORCO) 5-325 MG per tablet; Take 1 tablet by mouth every 4 hours as needed for Pain for up to 5 days. Intended supply: 5 days. Take lowest dose possible to manage pain Max Daily Amount: 6 tablets, Disp-30 tablet, R-0Normal    No follow-ups on file. SUBJECTIVE/OBJECTIVE:  Patient presents with continued left leg sciatic nerve pain. She has previously been treated with 10 days prednisone, then 2 medrol packs with only mild improvement in symptoms. She states pain is worse when sleeping or lying down. She stopped steroids on Friday and is now presenting with continued concerns. Current Outpatient Medications   Medication Sig Dispense Refill    HYDROcodone-acetaminophen (NORCO) 5-325 MG per tablet Take 1 tablet by mouth every 4 hours as needed for Pain for up to 5 days. Intended supply: 5 days. Take lowest dose possible to manage pain Max Daily Amount: 6 tablets 30 tablet 0    methylPREDNISolone (MEDROL DOSEPACK) 4 MG tablet Take by mouth.  1 kit 0    rivaroxaban (XARELTO) 20 MG TABS tablet Take 1 tablet by mouth daily (with breakfast) 14 tablet 0    flecainide (TAMBOCOR) 100 MG tablet Take 1 tablet by mouth 2 times daily 60 tablet 5    metoprolol succinate (TOPROL XL) 50 MG extended release tablet Take 1.5 tablets by mouth daily 45 tablet 5    omeprazole (PRILOSEC) 20 MG delayed release capsule Take 20 mg by mouth daily      triamterene-hydroCHLOROthiazide (MAXZIDE-25) 37.5-25 MG per tablet Take 1 tablet by mouth daily      clotrimazole-betamethasone (LOTRISONE) 1-0.05 % cream Apply topically 2 times daily Apply topically 2 times daily. [facility] Natural Products (T-RELIEF CBD+13 SL) Place under the tongue      dextromethorphan-guaiFENesin (MUCINEX DM)  MG per extended release tablet Take 1 tablet by mouth every 12 hours as needed      L-Lysine 1000 MG TABS Take by mouth      ketoconazole (NIZORAL) 2 % cream        No current facility-administered medications for this visit. Review of Systems   Constitutional:  Negative for chills, fatigue and fever. HENT:  Negative for congestion, ear pain, postnasal drip, rhinorrhea, sinus pressure, sneezing and sore throat. Eyes:  Negative for redness and itching. Respiratory:  Negative for cough, chest tightness, shortness of breath and wheezing. Cardiovascular:  Negative for chest pain and palpitations. Gastrointestinal:  Negative for abdominal pain, blood in stool, constipation, diarrhea, nausea and vomiting. Endocrine: Negative for cold intolerance and heat intolerance. Genitourinary:  Negative for difficulty urinating, dysuria, flank pain, frequency, hematuria and urgency. Musculoskeletal:  Positive for myalgias. Negative for arthralgias, back pain and joint swelling. Skin:  Negative for color change, pallor, rash and wound. Allergic/Immunologic: Negative for environmental allergies and food allergies. Neurological:  Negative for dizziness, seizures, syncope, weakness, light-headedness, numbness and headaches. Hematological:  Negative for adenopathy. Does not bruise/bleed easily. Psychiatric/Behavioral:  Negative for confusion, sleep disturbance and suicidal ideas. The patient is not nervous/anxious and is not hyperactive.       Vitals:    02/27/23 1448 BP: 110/78   Site: Left Upper Arm   Position: Sitting   Cuff Size: Medium Adult   Pulse: 54   Temp: 97.7 °F (36.5 °C)   SpO2: 97%   Weight: 190 lb (86.2 kg)       Physical Exam  Vitals reviewed. Constitutional:       Appearance: Normal appearance. HENT:      Head: Normocephalic and atraumatic. Nose: Nose normal.   Eyes:      Extraocular Movements: Extraocular movements intact. Conjunctiva/sclera: Conjunctivae normal.      Pupils: Pupils are equal, round, and reactive to light. Cardiovascular:      Rate and Rhythm: Normal rate and regular rhythm. Pulmonary:      Effort: Pulmonary effort is normal.      Breath sounds: Normal breath sounds. Musculoskeletal:         General: Normal range of motion. Cervical back: Normal range of motion. Skin:     General: Skin is warm. Coloration: Skin is not jaundiced or pale. Findings: No bruising, erythema, lesion or rash. Neurological:      Mental Status: She is alert and oriented to person, place, and time. Mental status is at baseline. Psychiatric:         Mood and Affect: Mood normal.         Behavior: Behavior normal.         Thought Content: Thought content normal.         Judgment: Judgment normal.               An electronic signature was used to authenticate this note.     --OMARI Bliss - CNP

## 2023-02-27 NOTE — ASSESSMENT & PLAN NOTE
Patient continues to experience left sided sciatic pain even after multiple rounds of steroids. She states pain has mildly improved but pain has not fully resolved. Based on continuing pain, will order lumbar x-ray to rule out other causes of inflammation. In addition, will send 5 days of norco to help alleviate pain while sleeping. Will follow up with x-ray results today. Patient agrees with plan.

## 2023-03-13 ENCOUNTER — TELEPHONE (OUTPATIENT)
Dept: FAMILY MEDICINE CLINIC | Age: 73
End: 2023-03-13

## 2023-03-13 DIAGNOSIS — M54.32 SCIATICA OF LEFT SIDE: Primary | ICD-10-CM

## 2023-03-13 NOTE — TELEPHONE ENCOUNTER
PT is not helping pt with sciatic nerve pain, son suggested a cortisone shot. Pt would like to know where to schedule that.

## 2023-03-13 NOTE — TELEPHONE ENCOUNTER
[address] [place], [facility] Zoya Jacobs  Ph: [phone]    Referral placed

## 2023-03-31 ENCOUNTER — HOSPITAL ENCOUNTER (OUTPATIENT)
Dept: MRI IMAGING | Age: 73
Discharge: HOME | End: 2023-03-31
Payer: MEDICARE

## 2023-03-31 DIAGNOSIS — M54.50 LOW BACK PAIN, UNSPECIFIED BACK PAIN LATERALITY, UNSPECIFIED CHRONICITY, UNSPECIFIED WHETHER SCIATICA PRESENT: ICD-10-CM

## 2023-03-31 PROCEDURE — 72148 MRI LUMBAR SPINE W/O DYE: CPT

## 2023-04-11 DIAGNOSIS — E78.2 MIXED HYPERLIPIDEMIA: ICD-10-CM

## 2023-04-11 DIAGNOSIS — I48.0 PAROXYSMAL ATRIAL FIBRILLATION (HCC): ICD-10-CM

## 2023-04-11 DIAGNOSIS — I10 ESSENTIAL HYPERTENSION: ICD-10-CM

## 2023-04-12 LAB
ALBUMIN SERPL-MCNC: 4.6 G/DL (ref 3.4–5)
ALBUMIN/GLOB SERPL: 2 {RATIO} (ref 1.1–2.2)
ALP SERPL-CCNC: 89 U/L (ref 40–129)
ALT SERPL-CCNC: 19 U/L (ref 10–40)
ANION GAP SERPL CALCULATED.3IONS-SCNC: 18 MMOL/L (ref 3–16)
AST SERPL-CCNC: 20 U/L (ref 15–37)
BILIRUB SERPL-MCNC: 0.3 MG/DL (ref 0–1)
BUN SERPL-MCNC: 13 MG/DL (ref 7–20)
CALCIUM SERPL-MCNC: 9.8 MG/DL (ref 8.3–10.6)
CHLORIDE SERPL-SCNC: 98 MMOL/L (ref 99–110)
CO2 SERPL-SCNC: 23 MMOL/L (ref 21–32)
CREAT SERPL-MCNC: 0.8 MG/DL (ref 0.6–1.2)
GFR SERPLBLD CREATININE-BSD FMLA CKD-EPI: >60 ML/MIN/{1.73_M2}
GLUCOSE SERPL-MCNC: 106 MG/DL (ref 70–99)
MAGNESIUM SERPL-MCNC: 2.1 MG/DL (ref 1.8–2.4)
POTASSIUM SERPL-SCNC: 4 MMOL/L (ref 3.5–5.1)
PROT SERPL-MCNC: 6.9 G/DL (ref 6.4–8.2)
SODIUM SERPL-SCNC: 139 MMOL/L (ref 136–145)
TSH SERPL DL<=0.005 MIU/L-ACNC: 2.28 UIU/ML (ref 0.27–4.2)

## 2023-06-27 DIAGNOSIS — I10 ESSENTIAL HYPERTENSION: ICD-10-CM

## 2023-06-27 DIAGNOSIS — I48.0 PAROXYSMAL ATRIAL FIBRILLATION (HCC): ICD-10-CM

## 2023-06-27 RX ORDER — METOPROLOL SUCCINATE 50 MG/1
100 TABLET, EXTENDED RELEASE ORAL DAILY
Qty: 90 TABLET | Refills: 3 | Status: SHIPPED | OUTPATIENT
Start: 2023-06-27 | End: 2023-06-29 | Stop reason: SDUPTHER

## 2023-06-27 RX ORDER — TRIAMTERENE AND HYDROCHLOROTHIAZIDE 37.5; 25 MG/1; MG/1
1 TABLET ORAL DAILY
Qty: 90 TABLET | Refills: 3 | Status: SHIPPED | OUTPATIENT
Start: 2023-06-27 | End: 2023-06-29 | Stop reason: SDUPTHER

## 2023-06-29 ENCOUNTER — TELEPHONE (OUTPATIENT)
Dept: CARDIOLOGY CLINIC | Age: 73
End: 2023-06-29

## 2023-06-29 DIAGNOSIS — I10 ESSENTIAL HYPERTENSION: ICD-10-CM

## 2023-06-29 DIAGNOSIS — I48.0 PAROXYSMAL ATRIAL FIBRILLATION (HCC): ICD-10-CM

## 2023-06-29 RX ORDER — TRIAMTERENE AND HYDROCHLOROTHIAZIDE 37.5; 25 MG/1; MG/1
1 TABLET ORAL DAILY
Qty: 90 TABLET | Refills: 3 | Status: SHIPPED | OUTPATIENT
Start: 2023-06-29

## 2023-06-29 RX ORDER — METOPROLOL SUCCINATE 100 MG/1
100 TABLET, EXTENDED RELEASE ORAL DAILY
Qty: 90 TABLET | Refills: 3 | Status: SHIPPED | OUTPATIENT
Start: 2023-06-29

## 2023-06-29 RX ORDER — FLECAINIDE ACETATE 100 MG/1
100 TABLET ORAL 2 TIMES DAILY
Qty: 60 TABLET | Refills: 5 | Status: SHIPPED | OUTPATIENT
Start: 2023-06-29

## 2023-07-06 PROBLEM — I48.91 ATRIAL FIBRILLATION (HCC): Chronic | Status: ACTIVE | Noted: 2022-07-20

## 2023-07-06 PROBLEM — I10 ESSENTIAL HYPERTENSION: Chronic | Status: ACTIVE | Noted: 2017-04-10

## 2023-07-06 PROBLEM — G47.10 HYPERSOMNIA: Status: ACTIVE | Noted: 2023-07-06

## 2023-07-06 PROBLEM — E66.9 NON MORBID OBESITY, UNSPECIFIED OBESITY TYPE: Status: ACTIVE | Noted: 2023-07-06

## 2023-07-08 ENCOUNTER — HOSPITAL ENCOUNTER (OUTPATIENT)
Dept: SLEEP CENTER | Age: 73
Discharge: HOME | End: 2023-07-08

## 2023-07-08 DIAGNOSIS — G47.33 OBSTRUCTIVE SLEEP APNEA (ADULT) (PEDIATRIC): ICD-10-CM

## 2023-07-11 ENCOUNTER — TELEPHONE (OUTPATIENT)
Dept: PULMONOLOGY | Age: 73
End: 2023-07-11

## 2023-07-11 DIAGNOSIS — G47.33 OBSTRUCTIVE SLEEP APNEA (ADULT) (PEDIATRIC): Primary | ICD-10-CM

## 2023-07-12 ENCOUNTER — OFFICE VISIT (OUTPATIENT)
Dept: CARDIOLOGY CLINIC | Age: 73
End: 2023-07-12

## 2023-07-12 VITALS
HEART RATE: 62 BPM | WEIGHT: 187.8 LBS | BODY MASS INDEX: 31.25 KG/M2 | DIASTOLIC BLOOD PRESSURE: 70 MMHG | SYSTOLIC BLOOD PRESSURE: 124 MMHG

## 2023-07-12 DIAGNOSIS — D68.69 SECONDARY HYPERCOAGULABLE STATE (HCC): ICD-10-CM

## 2023-07-12 DIAGNOSIS — I48.0 PAROXYSMAL ATRIAL FIBRILLATION (HCC): Primary | ICD-10-CM

## 2023-07-12 DIAGNOSIS — I10 ESSENTIAL HYPERTENSION: ICD-10-CM

## 2023-07-12 DIAGNOSIS — I48.92 PAROXYSMAL ATRIAL FLUTTER (HCC): ICD-10-CM

## 2023-07-12 DIAGNOSIS — Z51.81 ENCOUNTER FOR MONITORING FLECAINIDE THERAPY: ICD-10-CM

## 2023-07-12 DIAGNOSIS — Z79.01 ON CONTINUOUS ORAL ANTICOAGULATION: ICD-10-CM

## 2023-07-12 DIAGNOSIS — Z79.899 ENCOUNTER FOR MONITORING FLECAINIDE THERAPY: ICD-10-CM

## 2023-07-28 ENCOUNTER — TELEPHONE (OUTPATIENT)
Dept: CARDIOLOGY CLINIC | Age: 73
End: 2023-07-28

## 2023-07-31 RX ORDER — FLECAINIDE ACETATE 100 MG/1
100 TABLET ORAL 2 TIMES DAILY
Qty: 180 TABLET | Refills: 3 | Status: SHIPPED | OUTPATIENT
Start: 2023-07-31

## 2023-07-31 SDOH — ECONOMIC STABILITY: FOOD INSECURITY: WITHIN THE PAST 12 MONTHS, YOU WORRIED THAT YOUR FOOD WOULD RUN OUT BEFORE YOU GOT MONEY TO BUY MORE.: NEVER TRUE

## 2023-07-31 SDOH — ECONOMIC STABILITY: INCOME INSECURITY: HOW HARD IS IT FOR YOU TO PAY FOR THE VERY BASICS LIKE FOOD, HOUSING, MEDICAL CARE, AND HEATING?: NOT HARD AT ALL

## 2023-07-31 SDOH — ECONOMIC STABILITY: HOUSING INSECURITY
IN THE LAST 12 MONTHS, WAS THERE A TIME WHEN YOU DID NOT HAVE A STEADY PLACE TO SLEEP OR SLEPT IN A SHELTER (INCLUDING NOW)?: NO

## 2023-07-31 SDOH — ECONOMIC STABILITY: FOOD INSECURITY: WITHIN THE PAST 12 MONTHS, THE FOOD YOU BOUGHT JUST DIDN'T LAST AND YOU DIDN'T HAVE MONEY TO GET MORE.: NEVER TRUE

## 2023-07-31 SDOH — ECONOMIC STABILITY: TRANSPORTATION INSECURITY
IN THE PAST 12 MONTHS, HAS LACK OF TRANSPORTATION KEPT YOU FROM MEETINGS, WORK, OR FROM GETTING THINGS NEEDED FOR DAILY LIVING?: NO

## 2023-07-31 NOTE — TELEPHONE ENCOUNTER
Requested Prescriptions      No prescriptions requested or ordered in this encounter          Last Office Visit: 7/12/2023     Next Office Visit: 10/12/2023

## 2023-08-01 ENCOUNTER — OFFICE VISIT (OUTPATIENT)
Dept: FAMILY MEDICINE CLINIC | Age: 73
End: 2023-08-01
Payer: MEDICARE

## 2023-08-01 VITALS
WEIGHT: 189 LBS | OXYGEN SATURATION: 95 % | HEART RATE: 59 BPM | DIASTOLIC BLOOD PRESSURE: 80 MMHG | SYSTOLIC BLOOD PRESSURE: 128 MMHG | BODY MASS INDEX: 31.49 KG/M2 | TEMPERATURE: 98.6 F | HEIGHT: 65 IN

## 2023-08-01 DIAGNOSIS — D68.69 SECONDARY HYPERCOAGULABLE STATE (HCC): ICD-10-CM

## 2023-08-01 DIAGNOSIS — F51.01 PRIMARY INSOMNIA: ICD-10-CM

## 2023-08-01 DIAGNOSIS — Z00.00 ENCOUNTER FOR ANNUAL WELLNESS VISIT (AWV) IN MEDICARE PATIENT: ICD-10-CM

## 2023-08-01 DIAGNOSIS — Z23 NEED FOR TDAP VACCINATION: ICD-10-CM

## 2023-08-01 DIAGNOSIS — R49.9 CHANGE IN VOICE: ICD-10-CM

## 2023-08-01 DIAGNOSIS — I10 ESSENTIAL HYPERTENSION: Primary | Chronic | ICD-10-CM

## 2023-08-01 DIAGNOSIS — I48.0 PAROXYSMAL ATRIAL FIBRILLATION (HCC): Chronic | ICD-10-CM

## 2023-08-01 PROCEDURE — 3074F SYST BP LT 130 MM HG: CPT | Performed by: NURSE PRACTITIONER

## 2023-08-01 PROCEDURE — 90715 TDAP VACCINE 7 YRS/> IM: CPT | Performed by: NURSE PRACTITIONER

## 2023-08-01 PROCEDURE — 1123F ACP DISCUSS/DSCN MKR DOCD: CPT | Performed by: NURSE PRACTITIONER

## 2023-08-01 PROCEDURE — 3017F COLORECTAL CA SCREEN DOC REV: CPT | Performed by: NURSE PRACTITIONER

## 2023-08-01 PROCEDURE — G0439 PPPS, SUBSEQ VISIT: HCPCS | Performed by: NURSE PRACTITIONER

## 2023-08-01 PROCEDURE — 3079F DIAST BP 80-89 MM HG: CPT | Performed by: NURSE PRACTITIONER

## 2023-08-01 PROCEDURE — 90471 IMMUNIZATION ADMIN: CPT | Performed by: NURSE PRACTITIONER

## 2023-08-01 ASSESSMENT — LIFESTYLE VARIABLES
HOW OFTEN DO YOU HAVE A DRINK CONTAINING ALCOHOL: MONTHLY OR LESS
HOW MANY STANDARD DRINKS CONTAINING ALCOHOL DO YOU HAVE ON A TYPICAL DAY: PATIENT DOES NOT DRINK

## 2023-08-01 ASSESSMENT — PATIENT HEALTH QUESTIONNAIRE - PHQ9
10. IF YOU CHECKED OFF ANY PROBLEMS, HOW DIFFICULT HAVE THESE PROBLEMS MADE IT FOR YOU TO DO YOUR WORK, TAKE CARE OF THINGS AT HOME, OR GET ALONG WITH OTHER PEOPLE: 0
3. TROUBLE FALLING OR STAYING ASLEEP: 0
SUM OF ALL RESPONSES TO PHQ QUESTIONS 1-9: 0
SUM OF ALL RESPONSES TO PHQ QUESTIONS 1-9: 0
DEPRESSION UNABLE TO ASSESS: FUNCTIONAL CAPACITY MOTIVATION LIMITS ACCURACY
6. FEELING BAD ABOUT YOURSELF - OR THAT YOU ARE A FAILURE OR HAVE LET YOURSELF OR YOUR FAMILY DOWN: 0
4. FEELING TIRED OR HAVING LITTLE ENERGY: 0
2. FEELING DOWN, DEPRESSED OR HOPELESS: 0
9. THOUGHTS THAT YOU WOULD BE BETTER OFF DEAD, OR OF HURTING YOURSELF: 0
7. TROUBLE CONCENTRATING ON THINGS, SUCH AS READING THE NEWSPAPER OR WATCHING TELEVISION: 0
1. LITTLE INTEREST OR PLEASURE IN DOING THINGS: 0
5. POOR APPETITE OR OVEREATING: 0
SUM OF ALL RESPONSES TO PHQ9 QUESTIONS 1 & 2: 0
SUM OF ALL RESPONSES TO PHQ QUESTIONS 1-9: 0
8. MOVING OR SPEAKING SO SLOWLY THAT OTHER PEOPLE COULD HAVE NOTICED. OR THE OPPOSITE, BEING SO FIGETY OR RESTLESS THAT YOU HAVE BEEN MOVING AROUND A LOT MORE THAN USUAL: 0
SUM OF ALL RESPONSES TO PHQ QUESTIONS 1-9: 0

## 2023-08-01 NOTE — ASSESSMENT & PLAN NOTE
Patient states that she stopped her up PPI a few months ago and has not been having any symptoms of GERD but a couple days ago noticed a sudden change in her voice. Not sure if it is related anything. Suggest she started allergy medication evaluate if this improves symptoms and also monitor indigestion if symptoms of voice change not improve will follow-up with ENT.

## 2023-08-01 NOTE — PROGRESS NOTES
and has not been having any symptoms of GERD but a couple days ago noticed a sudden change in her voice. Not sure if it is related anything. Suggest she started allergy medication evaluate if this improves symptoms and also monitor indigestion if symptoms of voice change not improve will follow-up with ENT.   Secondary hypercoagulable state ([address])  Assessment & Plan:   Stable, controlled  No changes  Continue current treatment plan

## 2023-08-01 NOTE — ASSESSMENT & PLAN NOTE
From: Erica Trivedi  To: Jessica Hernandez  Sent: 8/20/2021 11:54 AM CDT  Subject: Non-Urgent Medical Question    Hi Dr. Hernandez,     I am fully vaccinated against covid, but have had a recent onset of sore throat, slight cough, stuffy nose, headache, and recently I lost my taste (can't even taste coffee) I tried to self schedule a covid test and it just says come back if symptoms worsen. Should I get tested? If so, how do I go about scheduling?     I ask because my boyfriend lives with me (also fully vaccinated) and while he has no symptoms he starts teaching in person on Monday. I don't want him to put anyone at risk if he is a symptomless carrier. Happy to do whatever you recommend.     Thank you!    Well exam   tdap  Fasting labs

## 2023-08-04 DIAGNOSIS — I48.0 PAROXYSMAL ATRIAL FIBRILLATION (HCC): Primary | ICD-10-CM

## 2023-08-07 DIAGNOSIS — I10 ESSENTIAL HYPERTENSION: ICD-10-CM

## 2023-08-07 DIAGNOSIS — I48.0 PAROXYSMAL ATRIAL FIBRILLATION (HCC): ICD-10-CM

## 2023-08-07 RX ORDER — METOPROLOL SUCCINATE 100 MG/1
100 TABLET, EXTENDED RELEASE ORAL DAILY
Qty: 90 TABLET | Refills: 3 | Status: SHIPPED | OUTPATIENT
Start: 2023-08-07

## 2023-08-07 NOTE — TELEPHONE ENCOUNTER
Requested Prescriptions     Pending Prescriptions Disp Refills    metoprolol succinate (TOPROL XL) 100 MG extended release tablet 90 tablet 3     Sig: Take 1 tablet by mouth daily            Last Office Visit: 7/12/2023     Next Office Visit: 10/12/2023

## 2023-08-10 ENCOUNTER — HOSPITAL ENCOUNTER (OUTPATIENT)
Dept: SLEEP CENTER | Age: 73
Discharge: HOME | End: 2023-08-10
Payer: MEDICARE

## 2023-08-10 DIAGNOSIS — G47.33 OBSTRUCTIVE SLEEP APNEA (ADULT) (PEDIATRIC): ICD-10-CM

## 2023-08-10 PROCEDURE — 95811 POLYSOM 6/>YRS CPAP 4/> PARM: CPT

## 2023-08-15 ENCOUNTER — TELEPHONE (OUTPATIENT)
Dept: PULMONOLOGY | Age: 73
End: 2023-08-15

## 2023-08-15 NOTE — TELEPHONE ENCOUNTER
Spoke with pt to review titration study results. Order to be sent to [facility].  Pt to schedule f/u

## 2023-09-08 DIAGNOSIS — Z00.00 ENCOUNTER FOR ANNUAL WELLNESS VISIT (AWV) IN MEDICARE PATIENT: ICD-10-CM

## 2023-09-09 LAB
ALBUMIN SERPL-MCNC: 4.3 G/DL (ref 3.4–5)
ALBUMIN/GLOB SERPL: 1.8 {RATIO} (ref 1.1–2.2)
ALP SERPL-CCNC: 84 U/L (ref 40–129)
ALT SERPL-CCNC: 17 U/L (ref 10–40)
ANION GAP SERPL CALCULATED.3IONS-SCNC: 7 MMOL/L (ref 3–16)
AST SERPL-CCNC: 21 U/L (ref 15–37)
BILIRUB SERPL-MCNC: 0.5 MG/DL (ref 0–1)
BUN SERPL-MCNC: 16 MG/DL (ref 7–20)
CALCIUM SERPL-MCNC: 9.6 MG/DL (ref 8.3–10.6)
CHLORIDE SERPL-SCNC: 98 MMOL/L (ref 99–110)
CHOLEST SERPL-MCNC: 178 MG/DL (ref 0–199)
CO2 SERPL-SCNC: 30 MMOL/L (ref 21–32)
CREAT SERPL-MCNC: 1 MG/DL (ref 0.6–1.2)
DEPRECATED RDW RBC AUTO: 15 % (ref 12.4–15.4)
EST. AVERAGE GLUCOSE BLD GHB EST-MCNC: 111.2 MG/DL
GFR SERPLBLD CREATININE-BSD FMLA CKD-EPI: 60 ML/MIN/{1.73_M2}
GLUCOSE SERPL-MCNC: 105 MG/DL (ref 70–99)
HBA1C MFR BLD: 5.5 %
HCT VFR BLD AUTO: 44.4 % (ref 36–48)
HDLC SERPL-MCNC: 48 MG/DL (ref 40–60)
HGB BLD-MCNC: 15.1 G/DL (ref 12–16)
LDL CHOLESTEROL CALCULATED: 99 MG/DL
MCH RBC QN AUTO: 27.4 PG (ref 26–34)
MCHC RBC AUTO-ENTMCNC: 33.9 G/DL (ref 31–36)
MCV RBC AUTO: 80.7 FL (ref 80–100)
PLATELET # BLD AUTO: 289 K/UL (ref 135–450)
PMV BLD AUTO: 8.1 FL (ref 5–10.5)
POTASSIUM SERPL-SCNC: 4 MMOL/L (ref 3.5–5.1)
PROT SERPL-MCNC: 6.7 G/DL (ref 6.4–8.2)
RBC # BLD AUTO: 5.5 M/UL (ref 4–5.2)
SODIUM SERPL-SCNC: 135 MMOL/L (ref 136–145)
TRIGL SERPL-MCNC: 156 MG/DL (ref 0–150)
TSH SERPL DL<=0.005 MIU/L-ACNC: 1.92 UIU/ML (ref 0.27–4.2)
VLDLC SERPL CALC-MCNC: 31 MG/DL
WBC # BLD AUTO: 7.2 K/UL (ref 4–11)

## 2023-10-04 NOTE — PROGRESS NOTES
[facility]   Cardiac Electrophysiology Consultation   Date: 10/4/2023  Reason for Consultation: AF  Consult Requesting Physician: No att. providers found     Chief Complaint:   No chief complaint on file. HPI: Vahe Leija is a 68 y.o. female with PMH significant for HTN, palpitations with normal 14 day monitor (6/2022), AF RVR found at PCP office (7/2022) after p/w palpitations, chest discomfort, and dizziness, spontaneous conversion to SR in ED, placed on Eliquis and Toprol increased. MPI and echo were both normal. 7 day CAM (1/2023) showed 11% AF and 9% AFL, placed on flecainide, repeat monitor (7/2023) showed single episode of AFL lasting 1.9 hrs, using [employer] mobile. Interval History: Today, she is here for management of AF, presenting in SR. for the past 6 months or so, she is doing reasonably well. She does have intermittent episodes of palpitation lasting for few minutes. She is using CPAP and felt better initially. However, for the past few months also she feels that she is not able to sleep continuously and that she has to get up and use the restroom very often and hence, she feels that she is not rested. As she continues to do very well with risk factor modification, it is reasonable to continue her on current medications. If she have recurrence of atrial fibrillation in spite of risk factor modification and antiarrhythmic therapy, then it is reasonable to consider catheter ablation for atrial fibrillation.     Assessment:  PAF/AFL, on Xarelto, Toprol  HTN, stable on Toprol, losartan, follows Dr. Aminata Dai:  Optimize risk factors for AF including treatment of AMNA, BP control, weight loss, healthy diet, routine exercise, and stress management  Continue Xarelto 20 mg daily, Toprol 50 mg daily  Continue to monitor for symptoms and rhythm with Kardia  Follow up in 6 months with EP NP, sooner if needed      Atrial Fibrillation:    BMI    :   There is no height or weight on file

## 2023-10-12 ENCOUNTER — OFFICE VISIT (OUTPATIENT)
Dept: CARDIOLOGY CLINIC | Age: 73
End: 2023-10-12

## 2023-10-12 VITALS
DIASTOLIC BLOOD PRESSURE: 78 MMHG | WEIGHT: 193 LBS | SYSTOLIC BLOOD PRESSURE: 118 MMHG | BODY MASS INDEX: 32.12 KG/M2 | HEART RATE: 61 BPM

## 2023-10-12 DIAGNOSIS — I48.92 ATRIAL FLUTTER, UNSPECIFIED TYPE (HCC): ICD-10-CM

## 2023-10-12 DIAGNOSIS — I48.0 PAROXYSMAL ATRIAL FIBRILLATION (HCC): Primary | ICD-10-CM

## 2023-10-12 DIAGNOSIS — I10 ESSENTIAL HYPERTENSION: ICD-10-CM

## 2023-10-12 RX ORDER — TRIAMTERENE AND HYDROCHLOROTHIAZIDE 37.5; 25 MG/1; MG/1
1 TABLET ORAL DAILY
Qty: 90 TABLET | Refills: 3 | Status: SHIPPED | OUTPATIENT
Start: 2023-10-12

## 2023-10-16 NOTE — PROGRESS NOTES
[facility] Phone: [phone]  Fax: [phone]    This note was scribed in the presence of Dr. Veronica Edwards MD by Kiran Amezcua RN. I confirm the note above accurately reflects all work, treatment, procedures, and medical decision making performed by me.     Electronically signed by Elvira Parr MD on 10/18/2023 at 10:25 AM

## 2023-10-17 ENCOUNTER — OFFICE VISIT (OUTPATIENT)
Dept: CARDIOLOGY CLINIC | Age: 73
End: 2023-10-17
Payer: MEDICARE

## 2023-10-17 VITALS
HEART RATE: 61 BPM | BODY MASS INDEX: 32.82 KG/M2 | SYSTOLIC BLOOD PRESSURE: 124 MMHG | WEIGHT: 197 LBS | DIASTOLIC BLOOD PRESSURE: 76 MMHG | HEIGHT: 65 IN | OXYGEN SATURATION: 97 %

## 2023-10-17 DIAGNOSIS — I10 ESSENTIAL HYPERTENSION: Primary | Chronic | ICD-10-CM

## 2023-10-17 DIAGNOSIS — I48.0 PAROXYSMAL ATRIAL FIBRILLATION (HCC): Chronic | ICD-10-CM

## 2023-10-17 PROCEDURE — G8484 FLU IMMUNIZE NO ADMIN: HCPCS | Performed by: INTERNAL MEDICINE

## 2023-10-17 PROCEDURE — 99214 OFFICE O/P EST MOD 30 MIN: CPT | Performed by: INTERNAL MEDICINE

## 2023-10-17 PROCEDURE — 1123F ACP DISCUSS/DSCN MKR DOCD: CPT | Performed by: INTERNAL MEDICINE

## 2023-10-17 PROCEDURE — G8399 PT W/DXA RESULTS DOCUMENT: HCPCS | Performed by: INTERNAL MEDICINE

## 2023-10-17 PROCEDURE — G8427 DOCREV CUR MEDS BY ELIG CLIN: HCPCS | Performed by: INTERNAL MEDICINE

## 2023-10-17 PROCEDURE — G8417 CALC BMI ABV UP PARAM F/U: HCPCS | Performed by: INTERNAL MEDICINE

## 2023-10-17 PROCEDURE — 3074F SYST BP LT 130 MM HG: CPT | Performed by: INTERNAL MEDICINE

## 2023-10-17 PROCEDURE — 1036F TOBACCO NON-USER: CPT | Performed by: INTERNAL MEDICINE

## 2023-10-17 PROCEDURE — 1090F PRES/ABSN URINE INCON ASSESS: CPT | Performed by: INTERNAL MEDICINE

## 2023-10-17 PROCEDURE — 3017F COLORECTAL CA SCREEN DOC REV: CPT | Performed by: INTERNAL MEDICINE

## 2023-10-17 PROCEDURE — 3078F DIAST BP <80 MM HG: CPT | Performed by: INTERNAL MEDICINE

## 2023-10-17 RX ORDER — AMLODIPINE BESYLATE 2.5 MG/1
2.5 TABLET ORAL DAILY
Qty: 30 TABLET | Refills: 5 | Status: SHIPPED | OUTPATIENT
Start: 2023-10-17

## 2023-10-23 ENCOUNTER — TELEPHONE (OUTPATIENT)
Dept: CARDIOLOGY CLINIC | Age: 73
End: 2023-10-23

## 2023-10-23 NOTE — TELEPHONE ENCOUNTER
Patient called to inform Dr. Domenico Bloom  of medication amlodipine 2.5 mg pt is experiencing dizziness , no energy pt has recently stopped medication and started triamterene HCTZ 37.5-25 mg 1 tablet daily please contact pt with additional questions and other options for  plan of care  please  advise

## 2023-10-24 PROBLEM — E66.9 NON MORBID OBESITY, UNSPECIFIED OBESITY TYPE: Chronic | Status: ACTIVE | Noted: 2023-07-06

## 2023-10-24 NOTE — TELEPHONE ENCOUNTER
Patient came into the office inquiring if Dr. Rafa Gillespie had received her message from yesterday. She stated she took herself off of the amLODIPine (NORVASC) 2.5 MG tablet altogether because it made her feel dizzy. She stated now she's taking the water pill triamterene-hydroCHLOROthiazide (MAXZIDE-25) 37.5-25 MG per tablet. She inquired if there's a pill that can help her hold more in her bladder because she's constantly urinating. Call back  [phone].

## 2023-11-01 ENCOUNTER — OFFICE VISIT (OUTPATIENT)
Dept: CARDIOLOGY CLINIC | Age: 73
End: 2023-11-01
Payer: MEDICARE

## 2023-11-01 VITALS
OXYGEN SATURATION: 97 % | HEART RATE: 63 BPM | WEIGHT: 197.6 LBS | SYSTOLIC BLOOD PRESSURE: 122 MMHG | DIASTOLIC BLOOD PRESSURE: 74 MMHG | BODY MASS INDEX: 32.88 KG/M2

## 2023-11-01 DIAGNOSIS — I48.0 PAROXYSMAL ATRIAL FIBRILLATION (HCC): Chronic | ICD-10-CM

## 2023-11-01 DIAGNOSIS — I10 ESSENTIAL HYPERTENSION: Primary | Chronic | ICD-10-CM

## 2023-11-01 DIAGNOSIS — E78.2 MIXED HYPERLIPIDEMIA: ICD-10-CM

## 2023-11-01 PROCEDURE — G8484 FLU IMMUNIZE NO ADMIN: HCPCS | Performed by: INTERNAL MEDICINE

## 2023-11-01 PROCEDURE — 1123F ACP DISCUSS/DSCN MKR DOCD: CPT | Performed by: INTERNAL MEDICINE

## 2023-11-01 PROCEDURE — G8417 CALC BMI ABV UP PARAM F/U: HCPCS | Performed by: INTERNAL MEDICINE

## 2023-11-01 PROCEDURE — 1090F PRES/ABSN URINE INCON ASSESS: CPT | Performed by: INTERNAL MEDICINE

## 2023-11-01 PROCEDURE — 3078F DIAST BP <80 MM HG: CPT | Performed by: INTERNAL MEDICINE

## 2023-11-01 PROCEDURE — G8427 DOCREV CUR MEDS BY ELIG CLIN: HCPCS | Performed by: INTERNAL MEDICINE

## 2023-11-01 PROCEDURE — 99214 OFFICE O/P EST MOD 30 MIN: CPT | Performed by: INTERNAL MEDICINE

## 2023-11-01 PROCEDURE — G8399 PT W/DXA RESULTS DOCUMENT: HCPCS | Performed by: INTERNAL MEDICINE

## 2023-11-01 PROCEDURE — 3074F SYST BP LT 130 MM HG: CPT | Performed by: INTERNAL MEDICINE

## 2023-11-01 PROCEDURE — 1036F TOBACCO NON-USER: CPT | Performed by: INTERNAL MEDICINE

## 2023-11-01 PROCEDURE — 3017F COLORECTAL CA SCREEN DOC REV: CPT | Performed by: INTERNAL MEDICINE

## 2023-11-01 RX ORDER — TRIAMTERENE AND HYDROCHLOROTHIAZIDE 37.5; 25 MG/1; MG/1
1 TABLET ORAL DAILY
Qty: 30 TABLET | Refills: 3
Start: 2023-11-01

## 2023-11-01 RX ORDER — TRIAMTERENE AND HYDROCHLOROTHIAZIDE 75; 50 MG/1; MG/1
1 TABLET ORAL DAILY
COMMUNITY
End: 2023-11-01

## 2023-11-01 NOTE — PROGRESS NOTES
[facility]     Outpatient Cardiology         Patient Name:  Shelton Menard  Requesting Physician: OMARI Mooney CNP  Primary Care Physician: OMARI Mooney CNP    Reason for Consultation/Chief Complaint:   Chief Complaint   Patient presents with    Hypertension     HPI:     Shelton Menard is a 68 y.o.  female with hypertension, AMNA (CPAP), palpitations, atrial fibrillation presents for follow up. Today she reports that she is feeling well from a cardiac standpoint. She restarted her Maxzide and is tolerating this well. She was not able to tolerate Amlodipine due to dizziness. Patient currently denies any weight gain, edema, palpitations, chest pain, shortness of breath, dizziness, and syncope. Histories:     Past Medical History:   has a past medical history of Anxiety, Bilateral leg edema, Chest pressure, Dizziness, Herpes, Hypertension, Insomnia, New onset a-fib ([address]), Obstructive sleep apnea syndrome, Palpitations, Radial head fracture, closed, left, closed, initial encounter, and Tinnitus. Surgical History:   has a past surgical history that includes Hysterectomy; Breast biopsy; Shoulder arthroscopy (Left); Elbow surgery; [place] tooth extraction; Hysterectomy, total abdominal; Ankle surgery (Left, 01/21/2018); and Colonoscopy (N/A, 12/17/2021). Social History:   reports that she quit smoking about 32 years ago. Her smoking use included cigarettes. She has a 15.00 pack-year smoking history. She has never used smokeless tobacco. She reports current alcohol use. She reports that she does not use drugs. Family History:  No evidence for sudden cardiac death or premature CAD    Medications:     Home Medications:  Were reviewed and are listed in nursing record. and/or listed below    Prior to Admission medications    Medication Sig Start Date End Date Taking?  Authorizing Provider   triamterene-hydroCHLOROthiazide (MAXZIDE-25) 37.5-25 MG per

## 2023-12-01 NOTE — TELEPHONE ENCOUNTER
Requested Prescriptions     Pending Prescriptions Disp Refills    flecainide (TAMBOCOR) 100 MG tablet 180 tablet 3     Sig: Take 1 tablet by mouth 2 times daily          Last Office Visit: 11/1/2023     Next Office Visit: 5/2/2024       Last Labs: [phone]

## 2023-12-04 RX ORDER — FLECAINIDE ACETATE 100 MG/1
100 TABLET ORAL 2 TIMES DAILY
Qty: 180 TABLET | Refills: 3 | Status: SHIPPED | OUTPATIENT
Start: 2023-12-04

## 2024-04-24 NOTE — PROGRESS NOTES
[facility]   Cardiac Electrophysiology Consultation   Date: 5/2/2024  Reason for Consultation: AF  Consult Requesting Physician: No att. providers found     Chief Complaint:   Chief Complaint   Patient presents with    Follow-up     6 Month follow up       HPI: Mitra Ornelas is a 73 y.o. female with PMH significant for HTN, palpitations with normal 14 day monitor (6/2022), AF RVR found at PCP office (7/2022) after p/w palpitations, chest discomfort, and dizziness, spontaneous conversion to SR in ED, placed on Eliquis and Toprol increased. MPI and echo were both normal. 7 day CAM (1/2023) showed 11% AF and 9% AFL, placed on flecainide, repeat monitor (7/2023) showed single episode of AFL lasting 1.9 hrs, using [employer] mobile. Found to have AMNA, now on CPAP.    Interval History:   Today, she is here for 6 mo f/u, presenting in SR with stable QRS. She reports ZAYAS with walking and fatigue. Review of Kardia tracings shows that she is having frequent episodes of AF happening every few days with HR up to 130-140. She has been trying to be more compliant with CPAP use. She has been taping her mouth shut at night and she feels this helps her AMNA better than the CPAP.    Assessment:  PAF/AFL, on Xarelto, flecainide, Toprol  HTN, stable on Toprol, Maxide, follows Dr. Duke  AMNA, compliant with CPAP    Plan:  Despite lifestyle modifications and AAD, she is having frequent recurrence of symptomatic AF. Hence, I recommend rhythm management with an AF ablation  Continue to monitor for rhythm with Kardia  Continue to follow up with Dr. Duke as previously scheduled  Follow up in 1 week with EP NP after the ablation  Follow up in 3 months with me    We educated the patient that atrial fibrillation and atrial flutter are both progressive diseases, with more frequent episodes that will ensue.  Subsequent episodes usually become more sustained to the extent that many individuals would then develop persistent atrial

## 2024-04-24 NOTE — H&P (VIEW-ONLY)
[facility]   Cardiac Electrophysiology Consultation   Date: 5/2/2024  Reason for Consultation: AF  Consult Requesting Physician: No att. providers found     Chief Complaint:   Chief Complaint   Patient presents with    Follow-up     6 Month follow up       HPI: Mitra Ornelas is a 73 y.o. female with PMH significant for HTN, palpitations with normal 14 day monitor (6/2022), AF RVR found at PCP office (7/2022) after p/w palpitations, chest discomfort, and dizziness, spontaneous conversion to SR in ED, placed on Eliquis and Toprol increased. MPI and echo were both normal. 7 day CAM (1/2023) showed 11% AF and 9% AFL, placed on flecainide, repeat monitor (7/2023) showed single episode of AFL lasting 1.9 hrs, using [employer] mobile. Found to have AMNA, now on CPAP.    Interval History:   Today, she is here for 6 mo f/u, presenting in SR with stable QRS. She reports ZAYAS with walking and fatigue. Review of Kardia tracings shows that she is having frequent episodes of AF happening every few days with HR up to 130-140. She has been trying to be more compliant with CPAP use. She has been taping her mouth shut at night and she feels this helps her AMNA better than the CPAP.    Assessment:  PAF/AFL, on Xarelto, flecainide, Toprol  HTN, stable on Toprol, Maxide, follows Dr. Duke  AMNA, compliant with CPAP    Plan:  Despite lifestyle modifications and AAD, she is having frequent recurrence of symptomatic AF. Hence, I recommend rhythm management with an AF ablation  Continue to monitor for rhythm with Kardia  Continue to follow up with Dr. Duke as previously scheduled  Follow up in 1 week with EP NP after the ablation  Follow up in 3 months with me    We educated the patient that atrial fibrillation and atrial flutter are both progressive diseases, with more frequent episodes that will ensue.  Subsequent episodes usually become more sustained to the extent that many individuals would then develop persistent atrial    nitrofurantoin, macrocrystal-monohydrate, (MACROBID) 100 MG capsule Take 1 capsule by mouth 2 times daily for 7 days 5/1/24 5/8/24 Yes Shelby Jennings APRN - CNP   flecainide (TAMBOCOR) 100 MG tablet Take 1 tablet by mouth 2 times daily 12/4/23  Yes Mouna Dize APRN - CNP   triamterene-hydroCHLOROthiazide (MAXZIDE-25) 37.5-25 MG per tablet Take 1 tablet by mouth daily 11/1/23  Yes Georgina Duke MD   metoprolol succinate (TOPROL XL) 100 MG extended release tablet Take 1 tablet by mouth daily 8/7/23  Yes Emily Su APRN - CNP   rivaroxaban (XARELTO) 20 MG TABS tablet Take 1 tablet by mouth daily (with breakfast) 6/29/23  Yes Georgina Duke MD       Social History:   reports that she quit smoking about 33 years ago. Her smoking use included cigarettes. She started smoking about 53 years ago. She has a 15.0 pack-year smoking history. She has never used smokeless tobacco. She reports current alcohol use. She reports that she does not use drugs.        Family History:  family history includes Arthritis in her father, maternal aunt, and mother; Cancer in her father; Heart Disease in her father, mother, and sister; High Blood Pressure in her brother, brother, and father.   Reviewed. Denies family history of sudden cardiac death, arrhythmia, premature CAD    Review of System:    General ROS: negative for - chills, fever   Psychological ROS: negative for - anxiety or depression  Ophthalmic ROS: negative for - eye pain or loss of vision  ENT ROS: negative for - epistaxis, headaches, nasal discharge, sore throat   Allergy and Immunology ROS: negative for - hives, nasal congestion   Hematological and Lymphatic ROS: negative for - bleeding problems, blood clots, bruising or jaundice  Endocrine ROS: negative for - skin changes, temperature intolerance or unexpected weight changes  Respiratory ROS: negative for - cough, hemoptysis, pleuritic pain, SOB, sputum changes or wheezing  Cardiovascular ROS: Per HPI.    Gastrointestinal ROS: negative for - abdominal pain, blood in stools, diarrhea, hematemesis, melena, nausea/vomiting or swallowing difficulty/pain  Genito-Urinary ROS: negative for - dysuria or incontinence  Musculoskeletal ROS: negative for - joint swelling or muscle pain  Neurological ROS: negative for - confusion, dizziness, gait disturbance, headaches, numbness/tingling, seizures, speech problems, tremors, visual changes or weakness  Dermatological ROS: negative for - rash    Physical Examination:  Vitals:    05/02/24 1401   BP: 134/82   Pulse: 58       Constitutional: Oriented. No distress.   Head: Normocephalic and atraumatic.   Mouth/Throat: Oropharynx is clear and moist.   Eyes: Conjunctivae normal. EOM are normal.   Neck: Normal range of motion. Neck supple. No rigidity.  No JVD present.   Cardiovascular: Normal rate, regular rhythm, S1&S2 and intact distal pulses.   Pulmonary/Chest: Bilateral respiratory sounds. No wheezes. No rhonchi.    Abdominal: Soft. Bowel sounds present. No distension, No tenderness.   Musculoskeletal: No tenderness. No edema    Lymphadenopathy: Has no cervical adenopathy.   Neurological: Alert and oriented. Cranial nerve appears intact, No Gross deficit   Skin: Skin is warm and dry. No rash noted.   Psychiatric: Has a normal mood, affect and behavior     Labs:  Reviewed.     ECG: reviewed, SB first degree AVB, HR 58, , QRS 89 ms    Studies:   1. 7 day CAM (7/12-7/19/23): SR with average HR 64 (), 1 run of AFL lasting up to 1.9 hrs, <1% PAC, <1% PVC, no reported symptoms    7 day CAM (1/19-/25/23): SR with average HR 76 (), 11.5% AF with longest episode lasting 11 hrs and rate up to 152, 9% AFL with longest episode lasting 2.2 hrs and rate up to 179, 3 runs of NSVT lasting up to  4 beats, <1% PVC, <1% PAC, symptoms correlating with AFL, SR    14 day Zio (6/28-7/12/22): SR with average HR 67 (), no significant arrhythmias, symptoms correlating with SR    2.

## 2024-05-01 ENCOUNTER — OFFICE VISIT (OUTPATIENT)
Dept: FAMILY MEDICINE CLINIC | Age: 74
End: 2024-05-01
Payer: MEDICARE

## 2024-05-01 VITALS
BODY MASS INDEX: 33.78 KG/M2 | SYSTOLIC BLOOD PRESSURE: 124 MMHG | WEIGHT: 203 LBS | DIASTOLIC BLOOD PRESSURE: 82 MMHG | TEMPERATURE: 96.9 F | HEART RATE: 55 BPM | OXYGEN SATURATION: 98 %

## 2024-05-01 DIAGNOSIS — F32.0 MILD SINGLE CURRENT EPISODE OF MAJOR DEPRESSIVE DISORDER (HCC): ICD-10-CM

## 2024-05-01 DIAGNOSIS — D68.69 SECONDARY HYPERCOAGULABLE STATE (HCC): ICD-10-CM

## 2024-05-01 DIAGNOSIS — I48.0 PAROXYSMAL ATRIAL FIBRILLATION (HCC): Chronic | ICD-10-CM

## 2024-05-01 DIAGNOSIS — N30.01 ACUTE CYSTITIS WITH HEMATURIA: Primary | ICD-10-CM

## 2024-05-01 LAB
BILIRUBIN, POC: NORMAL
BLOOD URINE, POC: NORMAL
CLARITY, POC: NORMAL
COLOR, POC: NORMAL
GLUCOSE URINE, POC: NORMAL
KETONES, POC: NORMAL
LEUKOCYTE EST, POC: NORMAL
NITRITE, POC: NORMAL
PH, POC: 6.5
PROTEIN, POC: NORMAL
SPECIFIC GRAVITY, POC: 1
UROBILINOGEN, POC: 0.2

## 2024-05-01 PROCEDURE — 1090F PRES/ABSN URINE INCON ASSESS: CPT | Performed by: NURSE PRACTITIONER

## 2024-05-01 PROCEDURE — 99214 OFFICE O/P EST MOD 30 MIN: CPT | Performed by: NURSE PRACTITIONER

## 2024-05-01 PROCEDURE — 3074F SYST BP LT 130 MM HG: CPT | Performed by: NURSE PRACTITIONER

## 2024-05-01 PROCEDURE — G8399 PT W/DXA RESULTS DOCUMENT: HCPCS | Performed by: NURSE PRACTITIONER

## 2024-05-01 PROCEDURE — 81002 URINALYSIS NONAUTO W/O SCOPE: CPT | Performed by: NURSE PRACTITIONER

## 2024-05-01 PROCEDURE — G8427 DOCREV CUR MEDS BY ELIG CLIN: HCPCS | Performed by: NURSE PRACTITIONER

## 2024-05-01 PROCEDURE — G8417 CALC BMI ABV UP PARAM F/U: HCPCS | Performed by: NURSE PRACTITIONER

## 2024-05-01 PROCEDURE — G2211 COMPLEX E/M VISIT ADD ON: HCPCS | Performed by: NURSE PRACTITIONER

## 2024-05-01 PROCEDURE — 3017F COLORECTAL CA SCREEN DOC REV: CPT | Performed by: NURSE PRACTITIONER

## 2024-05-01 PROCEDURE — 1036F TOBACCO NON-USER: CPT | Performed by: NURSE PRACTITIONER

## 2024-05-01 PROCEDURE — 3079F DIAST BP 80-89 MM HG: CPT | Performed by: NURSE PRACTITIONER

## 2024-05-01 PROCEDURE — 1123F ACP DISCUSS/DSCN MKR DOCD: CPT | Performed by: NURSE PRACTITIONER

## 2024-05-01 RX ORDER — NITROFURANTOIN 25; 75 MG/1; MG/1
100 CAPSULE ORAL 2 TIMES DAILY
Qty: 14 CAPSULE | Refills: 0 | Status: SHIPPED | OUTPATIENT
Start: 2024-05-01 | End: 2024-05-08

## 2024-05-01 ASSESSMENT — PATIENT HEALTH QUESTIONNAIRE - PHQ9
8. MOVING OR SPEAKING SO SLOWLY THAT OTHER PEOPLE COULD HAVE NOTICED. OR THE OPPOSITE, BEING SO FIGETY OR RESTLESS THAT YOU HAVE BEEN MOVING AROUND A LOT MORE THAN USUAL: NOT AT ALL
2. FEELING DOWN, DEPRESSED OR HOPELESS: NOT AT ALL
4. FEELING TIRED OR HAVING LITTLE ENERGY: NOT AT ALL
6. FEELING BAD ABOUT YOURSELF - OR THAT YOU ARE A FAILURE OR HAVE LET YOURSELF OR YOUR FAMILY DOWN: NOT AT ALL
1. LITTLE INTEREST OR PLEASURE IN DOING THINGS: NOT AT ALL
10. IF YOU CHECKED OFF ANY PROBLEMS, HOW DIFFICULT HAVE THESE PROBLEMS MADE IT FOR YOU TO DO YOUR WORK, TAKE CARE OF THINGS AT HOME, OR GET ALONG WITH OTHER PEOPLE: NOT DIFFICULT AT ALL
5. POOR APPETITE OR OVEREATING: NOT AT ALL
3. TROUBLE FALLING OR STAYING ASLEEP: NOT AT ALL
SUM OF ALL RESPONSES TO PHQ QUESTIONS 1-9: 0
SUM OF ALL RESPONSES TO PHQ QUESTIONS 1-9: 0
9. THOUGHTS THAT YOU WOULD BE BETTER OFF DEAD, OR OF HURTING YOURSELF: NOT AT ALL
SUM OF ALL RESPONSES TO PHQ QUESTIONS 1-9: 0
SUM OF ALL RESPONSES TO PHQ QUESTIONS 1-9: 0
SUM OF ALL RESPONSES TO PHQ9 QUESTIONS 1 & 2: 0
7. TROUBLE CONCENTRATING ON THINGS, SUCH AS READING THE NEWSPAPER OR WATCHING TELEVISION: NOT AT ALL

## 2024-05-01 ASSESSMENT — ENCOUNTER SYMPTOMS
COUGH: 0
COLOR CHANGE: 0
EYE ITCHING: 0
SHORTNESS OF BREATH: 0
BLOOD IN STOOL: 0
ABDOMINAL PAIN: 0
EYE REDNESS: 0
SINUS PRESSURE: 0
NAUSEA: 0
CHEST TIGHTNESS: 0
RHINORRHEA: 0
SORE THROAT: 0
DIARRHEA: 0
VOMITING: 0
WHEEZING: 0
BACK PAIN: 0
CONSTIPATION: 0

## 2024-05-01 NOTE — ASSESSMENT & PLAN NOTE
Acute cystitis with gross hematuria.  We will send for culture and start patient on Macrobid.  I have asked patient to complete the antibiotics and after 2 weeks return to the lab for repeat urinalysis with reflex to culture.  Advised patient that if the hematuria does not stop or slow down she should contact this office.

## 2024-05-01 NOTE — PROGRESS NOTES
Mitra Ornelas (:  1950) is a 73 y.o. female,Established patient, here for evaluation of the following chief complaint(s):  Hematuria (X 2 days no itching or burning just blood.)      ASSESSMENT/PLAN:  1. Acute cystitis with hematuria  Assessment & Plan:   Acute cystitis with gross hematuria.  We will send for culture and start patient on Macrobid.  I have asked patient to complete the antibiotics and after 2 weeks return to the lab for repeat urinalysis with reflex to culture.  Advised patient that if the hematuria does not stop or slow down she should contact this office.  Orders:  -     Urinalysis with Reflex to Culture  -     POCT Urinalysis no Micro  -     Culture, Urine  2. Mild single current episode of major depressive disorder (HCC)  Assessment & Plan:   Well-controlled, continue current medications  3. Secondary hypercoagulable state (HCC)  Assessment & Plan:   Well-controlled, continue current medications  4. Paroxysmal atrial fibrillation (HCC)  Assessment & Plan:   This is a chronic stable condition continue medications with no changes at this time.      No follow-ups on file.    SUBJECTIVE/OBJECTIVE:  Patient the office today with gross hematuria which started yesterday.  She states that there are no urinary symptoms of frequency, urgency, dysuria, abdominal pain, flank pain, or back pain.  Denies any fevers.  Denies any constipation nausea or vomiting.  She has not been taking anything for her symptoms.  She states that she is certain that it is coming from her urine and not vaginal.  Of note she is on Xarelto and denies any other bleeding or bruising that she is aware of.  Denies any rectal bleeding.  Current Outpatient Medications   Medication Sig Dispense Refill    nitrofurantoin, macrocrystal-monohydrate, (MACROBID) 100 MG capsule Take 1 capsule by mouth 2 times daily for 7 days 14 capsule 0    flecainide (TAMBOCOR) 100 MG tablet Take 1 tablet by mouth 2 times daily 180 tablet 3

## 2024-05-02 ENCOUNTER — OFFICE VISIT (OUTPATIENT)
Dept: CARDIOLOGY CLINIC | Age: 74
End: 2024-05-02

## 2024-05-02 ENCOUNTER — TELEPHONE (OUTPATIENT)
Dept: CARDIOLOGY CLINIC | Age: 74
End: 2024-05-02

## 2024-05-02 VITALS
HEART RATE: 58 BPM | DIASTOLIC BLOOD PRESSURE: 82 MMHG | SYSTOLIC BLOOD PRESSURE: 134 MMHG | BODY MASS INDEX: 33.95 KG/M2 | WEIGHT: 204 LBS

## 2024-05-02 DIAGNOSIS — G47.33 OSA (OBSTRUCTIVE SLEEP APNEA): ICD-10-CM

## 2024-05-02 DIAGNOSIS — I48.0 PAROXYSMAL ATRIAL FIBRILLATION (HCC): Primary | ICD-10-CM

## 2024-05-02 DIAGNOSIS — I48.92 PAROXYSMAL ATRIAL FLUTTER (HCC): ICD-10-CM

## 2024-05-02 DIAGNOSIS — I10 ESSENTIAL HYPERTENSION: ICD-10-CM

## 2024-05-02 NOTE — PATIENT INSTRUCTIONS
Electrophysiology Study and Atrial Fibrillation (AFib) Ablation    Date of Procedure:     Time of Arrival:     Cardiologist performing procedure: Dr. Crane    You will get a call from our  with the date and time.    Do not eat or drink anything after midnight the night before the procedure.      You may brush your teeth and rinse the morning of the procedure.    Please hold flecainide for 2 days prior to procedure.    Do NOT stop taking Xarelto (any blood thinners) leading up to the procedure.  However, do not take any blood thinners the morning of the procedure.    Take all your other routine medications the morning of the procedure with the following exceptions:  If you take a blood thinner, please HOLD on the day of the procedure.  Hold any other medications as instructed above.    Do not apply any lotion, powder, or deodorant the morning of the procedure.    Please bring a list of your medications to the hospital with you.    CT scan of the chest is due within a week of the procedure. Someone will call you with the date/time of the CT scan.       Lab work is due within a week of the procedure (can be done the same day as the CT scan). You do not need to be fasting for these labs. This can be done at the [facility] lab at [address].    You must have someone available to drive you home the same day.  It is recommended that you do not drive for 48 hours after the procedure.  You will also need someone with you at home the night of the procedure.    If you are unable to make this appointment, [facility], at [phone].

## 2024-05-02 NOTE — TELEPHONE ENCOUNTER
Please schedule pt for AF ablation with [facility] at [facility] after 3-4 weeks. EP form started. Instructions reviewed with pt during OV today.

## 2024-05-03 LAB
BACTERIA UR CULT: ABNORMAL
ORGANISM: ABNORMAL

## 2024-05-08 ENCOUNTER — PREP FOR PROCEDURE (OUTPATIENT)
Dept: CARDIOLOGY CLINIC | Age: 74
End: 2024-05-08

## 2024-05-08 NOTE — TELEPHONE ENCOUNTER
Spoke with the pt and got her scheduled for procedure. We went over instructions below and she verbalized understanding.     Electrophysiology Study and Atrial Fibrillation (AFib) Ablation     Date of Procedure: 5/31/24     Time of Arrival: 8:00 am  Procedure time: 10:00 am      Cardiologist performing procedure: Dr. Crane     You will get a call from our  with the date and time.     Do not eat or drink anything after midnight the night before the procedure.       You may brush your teeth and rinse the morning of the procedure.     Please hold flecainide for 2 days prior to procedure.     Do NOT stop taking Xarelto (any blood thinners) leading up to the procedure.  However, do not take any blood thinners the morning of the procedure.     Take all your other routine medications the morning of the procedure with the following exceptions:  If you take a blood thinner, please HOLD on the day of the procedure.  Hold any other medications as instructed above.     Do not apply any lotion, powder, or deodorant the morning of the procedure.     Please bring a list of your medications to the hospital with you.     CT scan of the chest is due within a week of the procedure. Someone will call you with the date/time of the CT scan.        Lab work is due within a week of the procedure (can be done the same day as the CT scan). You do not need to be fasting for these labs. This can be done at the [facility] lab at [address].     You must have someone available to drive you home the same day.  It is recommended that you do not drive for 48 hours after the procedure.  You will also need someone with you at home the night of the procedure.     If you are unable to make this appointment, please call [facility], at [phone].    Qgenda updated / added cupid / emailed cath lab

## 2024-05-09 RX ORDER — SODIUM CHLORIDE 0.9 % (FLUSH) 0.9 %
5-40 SYRINGE (ML) INJECTION EVERY 12 HOURS SCHEDULED
Status: CANCELLED | OUTPATIENT
Start: 2024-05-09

## 2024-05-09 RX ORDER — SODIUM CHLORIDE 9 MG/ML
INJECTION, SOLUTION INTRAVENOUS PRN
Status: CANCELLED | OUTPATIENT
Start: 2024-05-09

## 2024-05-09 RX ORDER — SODIUM CHLORIDE 0.9 % (FLUSH) 0.9 %
5-40 SYRINGE (ML) INJECTION PRN
Status: CANCELLED | OUTPATIENT
Start: 2024-05-09

## 2024-05-21 ENCOUNTER — TELEPHONE (OUTPATIENT)
Dept: CARDIOLOGY CLINIC | Age: 74
End: 2024-05-21

## 2024-05-21 NOTE — TELEPHONE ENCOUNTER
Spoke with pt and confirmed Af ablation on 05/31/2024, pt arriving at [facility] at 08:30am.  Reviewed all preop instructions previously given to pt.  Pt verbalized understanding.

## 2024-05-22 ENCOUNTER — OFFICE VISIT (OUTPATIENT)
Dept: FAMILY MEDICINE CLINIC | Age: 74
End: 2024-05-22
Payer: MEDICARE

## 2024-05-22 VITALS
TEMPERATURE: 96.9 F | WEIGHT: 205 LBS | HEART RATE: 57 BPM | DIASTOLIC BLOOD PRESSURE: 80 MMHG | SYSTOLIC BLOOD PRESSURE: 122 MMHG | BODY MASS INDEX: 34.11 KG/M2 | OXYGEN SATURATION: 96 %

## 2024-05-22 DIAGNOSIS — N30.01 ACUTE CYSTITIS WITH HEMATURIA: ICD-10-CM

## 2024-05-22 DIAGNOSIS — I48.0 PAROXYSMAL ATRIAL FIBRILLATION (HCC): Chronic | ICD-10-CM

## 2024-05-22 DIAGNOSIS — R30.0 DYSURIA: Primary | ICD-10-CM

## 2024-05-22 LAB
BILIRUBIN, POC: NORMAL
BLOOD URINE, POC: NORMAL
CLARITY, POC: CLEAR
COLOR, POC: YELLOW
GLUCOSE URINE, POC: NORMAL
KETONES, POC: NORMAL
LEUKOCYTE EST, POC: NORMAL
NITRITE, POC: NORMAL
PH, POC: 6
PROTEIN, POC: NORMAL
SPECIFIC GRAVITY, POC: 1.02
UROBILINOGEN, POC: 0.2

## 2024-05-22 PROCEDURE — 1090F PRES/ABSN URINE INCON ASSESS: CPT | Performed by: NURSE PRACTITIONER

## 2024-05-22 PROCEDURE — 1036F TOBACCO NON-USER: CPT | Performed by: NURSE PRACTITIONER

## 2024-05-22 PROCEDURE — G8427 DOCREV CUR MEDS BY ELIG CLIN: HCPCS | Performed by: NURSE PRACTITIONER

## 2024-05-22 PROCEDURE — 81002 URINALYSIS NONAUTO W/O SCOPE: CPT | Performed by: NURSE PRACTITIONER

## 2024-05-22 PROCEDURE — 99214 OFFICE O/P EST MOD 30 MIN: CPT | Performed by: NURSE PRACTITIONER

## 2024-05-22 PROCEDURE — 3079F DIAST BP 80-89 MM HG: CPT | Performed by: NURSE PRACTITIONER

## 2024-05-22 PROCEDURE — 3017F COLORECTAL CA SCREEN DOC REV: CPT | Performed by: NURSE PRACTITIONER

## 2024-05-22 PROCEDURE — 3074F SYST BP LT 130 MM HG: CPT | Performed by: NURSE PRACTITIONER

## 2024-05-22 PROCEDURE — 1123F ACP DISCUSS/DSCN MKR DOCD: CPT | Performed by: NURSE PRACTITIONER

## 2024-05-22 PROCEDURE — G8417 CALC BMI ABV UP PARAM F/U: HCPCS | Performed by: NURSE PRACTITIONER

## 2024-05-22 PROCEDURE — G2211 COMPLEX E/M VISIT ADD ON: HCPCS | Performed by: NURSE PRACTITIONER

## 2024-05-22 PROCEDURE — G8399 PT W/DXA RESULTS DOCUMENT: HCPCS | Performed by: NURSE PRACTITIONER

## 2024-05-22 ASSESSMENT — ENCOUNTER SYMPTOMS
COUGH: 0
DIARRHEA: 0
SHORTNESS OF BREATH: 0
BACK PAIN: 0
EYE REDNESS: 0
CHEST TIGHTNESS: 0
EYE ITCHING: 0
VOMITING: 0
NAUSEA: 0
CONSTIPATION: 0
BLOOD IN STOOL: 0
COLOR CHANGE: 0
SORE THROAT: 0
RHINORRHEA: 0
SINUS PRESSURE: 0
ABDOMINAL PAIN: 0
WHEEZING: 0

## 2024-05-22 NOTE — ASSESSMENT & PLAN NOTE
This is an acute problem that is now controlled and stable. POCT urinalysis completed, trace leukocytes noted., hematuria resolved. No further treatment needed at this time.

## 2024-05-22 NOTE — ASSESSMENT & PLAN NOTE
This is a chronic problem that is not controlled or stable. Pt is followed by cardiology and planning for a cardiac ablation.

## 2024-05-24 ENCOUNTER — HOSPITAL ENCOUNTER (OUTPATIENT)
Dept: CT IMAGING | Age: 74
Discharge: HOME | End: 2024-05-24
Payer: MEDICARE

## 2024-05-24 DIAGNOSIS — I48.0 PAROXYSMAL ATRIAL FIBRILLATION (HCC): ICD-10-CM

## 2024-05-24 LAB
ANION GAP SERPL CALCULATED.3IONS-SCNC: 12 MMOL/L (ref 3–16)
BACTERIA UR CULT: ABNORMAL
BUN SERPL-MCNC: 18 MG/DL (ref 7–20)
CALCIUM SERPL-MCNC: 9 MG/DL (ref 8.3–10.6)
CHLORIDE SERPL-SCNC: 101 MMOL/L (ref 99–110)
CO2 SERPL-SCNC: 26 MMOL/L (ref 21–32)
CREAT SERPL-MCNC: 0.9 MG/DL (ref 0.6–1.2)
DEPRECATED RDW RBC AUTO: 14.7 % (ref 12.4–15.4)
GLUCOSE SERPL-MCNC: 108 MG/DL (ref 70–99)
HCT VFR BLD AUTO: 42.4 % (ref 36–48)
HGB BLD-MCNC: 14.2 G/DL (ref 12–16)
INR PPP: 2.19 (ref 0.85–1.15)
MCH RBC QN AUTO: 27.1 PG (ref 26–34)
MCHC RBC AUTO-ENTMCNC: 33.5 G/DL (ref 31–36)
MCV RBC AUTO: 81 FL (ref 80–100)
ORGANISM: ABNORMAL
PERFORMED ON: NORMAL
PLATELET # BLD AUTO: 268 K/UL (ref 135–450)
PMV BLD AUTO: 8.1 FL (ref 5–10.5)
POC CREATININE: 0.9 MG/DL (ref 0.6–1.2)
POC SAMPLE TYPE: NORMAL
POTASSIUM SERPL-SCNC: 4.3 MMOL/L (ref 3.5–5.1)
PROTHROMBIN TIME: 24.4 SEC (ref 11.9–14.9)
RBC # BLD AUTO: 5.23 M/UL (ref 4–5.2)
SODIUM SERPL-SCNC: 139 MMOL/L (ref 136–145)
WBC # BLD AUTO: 7.9 K/UL (ref 4–11)

## 2024-05-24 PROCEDURE — 82565 ASSAY OF CREATININE: CPT

## 2024-05-24 PROCEDURE — 6360000004 HC RX CONTRAST MEDICATION: Performed by: NURSE PRACTITIONER

## 2024-05-24 PROCEDURE — 71275 CT ANGIOGRAPHY CHEST: CPT

## 2024-05-24 RX ADMIN — IOPAMIDOL 75 ML: 755 INJECTION, SOLUTION INTRAVENOUS at 08:58

## 2024-05-29 NOTE — PROGRESS NOTES
inadvertent computerized transcription errors may be present.     Patient received education regarding their diagnosis, treatment and medications while in the office today.    Abdelrahman Su [facility]    I  have spent 40 minutes in care of the patient including direct face to face time, chart preparation, reviewing diagnostic testing, other provider notes and coordinating patient care.

## 2024-05-30 NOTE — FLOWSHEET NOTE
Called patient about procedure. Told to be here at 0830 for procedure at 1000 Must be NPO after midnight but can take morning medication with sips of water. Patient stated they stopped blood thinners  after 5/29 dose prior to procedure as directed by the office. Told to have a responsible adult with them to take them home and stay with them afterwards, if they do not get admitted to hospital. Also, to bring a current list of medications. No other questions or concerns.

## 2024-05-31 ENCOUNTER — HOSPITAL ENCOUNTER (OUTPATIENT)
Age: 74
Setting detail: OUTPATIENT SURGERY
Discharge: HOME | End: 2024-05-31
Attending: INTERNAL MEDICINE | Admitting: INTERNAL MEDICINE
Payer: MEDICARE

## 2024-05-31 ENCOUNTER — ANESTHESIA EVENT (OUTPATIENT)
Dept: INTERVENTIONAL RADIOLOGY/VASCULAR | Age: 74
End: 2024-05-31
Payer: MEDICARE

## 2024-05-31 ENCOUNTER — ANESTHESIA (OUTPATIENT)
Dept: INTERVENTIONAL RADIOLOGY/VASCULAR | Age: 74
End: 2024-05-31
Payer: MEDICARE

## 2024-05-31 VITALS
TEMPERATURE: 97.4 F | HEIGHT: 65 IN | SYSTOLIC BLOOD PRESSURE: 121 MMHG | RESPIRATION RATE: 22 BRPM | OXYGEN SATURATION: 96 % | HEART RATE: 82 BPM | BODY MASS INDEX: 33.99 KG/M2 | WEIGHT: 204 LBS | DIASTOLIC BLOOD PRESSURE: 69 MMHG

## 2024-05-31 DIAGNOSIS — I48.0 PAROXYSMAL ATRIAL FIBRILLATION (HCC): ICD-10-CM

## 2024-05-31 LAB
ECHO BSA: 2.06 M2
EKG ATRIAL RATE: 58 BPM
EKG DIAGNOSIS: NORMAL
EKG P AXIS: 43 DEGREES
EKG P-R INTERVAL: 232 MS
EKG Q-T INTERVAL: 438 MS
EKG QRS DURATION: 94 MS
EKG QTC CALCULATION (BAZETT): 429 MS
EKG R AXIS: -16 DEGREES
EKG T AXIS: 60 DEGREES
EKG VENTRICULAR RATE: 58 BPM
POC ACT LR: 273 SEC
POC ACT LR: 291 SEC
POC ACT LR: 296 SEC
POC ACT LR: 335 SEC
POC ACT LR: 339 SEC
POC ACT LR: 365 SEC

## 2024-05-31 PROCEDURE — 2720000010 HC SURG SUPPLY STERILE: Performed by: INTERNAL MEDICINE

## 2024-05-31 PROCEDURE — 93623 PRGRMD STIMJ&PACG IV RX NFS: CPT | Performed by: INTERNAL MEDICINE

## 2024-05-31 PROCEDURE — G0269 OCCLUSIVE DEVICE IN VEIN ART: HCPCS | Performed by: INTERNAL MEDICINE

## 2024-05-31 PROCEDURE — C1766 INTRO/SHEATH,STRBLE,NON-PEEL: HCPCS | Performed by: INTERNAL MEDICINE

## 2024-05-31 PROCEDURE — 3700000001 HC ADD 15 MINUTES (ANESTHESIA): Performed by: INTERNAL MEDICINE

## 2024-05-31 PROCEDURE — C1894 INTRO/SHEATH, NON-LASER: HCPCS | Performed by: INTERNAL MEDICINE

## 2024-05-31 PROCEDURE — 7100000010 HC PHASE II RECOVERY - FIRST 15 MIN: Performed by: INTERNAL MEDICINE

## 2024-05-31 PROCEDURE — 93622 COMP EP EVAL L VENTR PAC&REC: CPT | Performed by: INTERNAL MEDICINE

## 2024-05-31 PROCEDURE — 2500000003 HC RX 250 WO HCPCS: Performed by: NURSE ANESTHETIST, CERTIFIED REGISTERED

## 2024-05-31 PROCEDURE — 93655 ICAR CATH ABLTJ DSCRT ARRHYT: CPT | Performed by: INTERNAL MEDICINE

## 2024-05-31 PROCEDURE — 2709999900 HC NON-CHARGEABLE SUPPLY: Performed by: INTERNAL MEDICINE

## 2024-05-31 PROCEDURE — 85610 PROTHROMBIN TIME: CPT

## 2024-05-31 PROCEDURE — C1732 CATH, EP, DIAG/ABL, 3D/VECT: HCPCS | Performed by: INTERNAL MEDICINE

## 2024-05-31 PROCEDURE — C1760 CLOSURE DEV, VASC: HCPCS | Performed by: INTERNAL MEDICINE

## 2024-05-31 PROCEDURE — 2580000003 HC RX 258: Performed by: ANESTHESIOLOGY

## 2024-05-31 PROCEDURE — 93656 COMPRE EP EVAL ABLTJ ATR FIB: CPT | Performed by: INTERNAL MEDICINE

## 2024-05-31 PROCEDURE — 7100000000 HC PACU RECOVERY - FIRST 15 MIN: Performed by: INTERNAL MEDICINE

## 2024-05-31 PROCEDURE — 93655 ICAR CATH ABLTJ DSCRT ARRHYT: CPT

## 2024-05-31 PROCEDURE — 85347 COAGULATION TIME ACTIVATED: CPT

## 2024-05-31 PROCEDURE — C1759 CATH, INTRA ECHOCARDIOGRAPHY: HCPCS | Performed by: INTERNAL MEDICINE

## 2024-05-31 PROCEDURE — 7100000011 HC PHASE II RECOVERY - ADDTL 15 MIN: Performed by: INTERNAL MEDICINE

## 2024-05-31 PROCEDURE — 6360000002 HC RX W HCPCS: Performed by: NURSE ANESTHETIST, CERTIFIED REGISTERED

## 2024-05-31 PROCEDURE — 93005 ELECTROCARDIOGRAM TRACING: CPT

## 2024-05-31 PROCEDURE — 6360000002 HC RX W HCPCS: Performed by: INTERNAL MEDICINE

## 2024-05-31 PROCEDURE — 3700000000 HC ANESTHESIA ATTENDED CARE: Performed by: INTERNAL MEDICINE

## 2024-05-31 PROCEDURE — 7100000001 HC PACU RECOVERY - ADDTL 15 MIN: Performed by: INTERNAL MEDICINE

## 2024-05-31 PROCEDURE — 93657 TX L/R ATRIAL FIB ADDL: CPT | Performed by: INTERNAL MEDICINE

## 2024-05-31 RX ORDER — SODIUM CHLORIDE 9 MG/ML
INJECTION, SOLUTION INTRAVENOUS PRN
Status: DISCONTINUED | OUTPATIENT
Start: 2024-05-31 | End: 2024-05-31 | Stop reason: HOSPADM

## 2024-05-31 RX ORDER — ONDANSETRON 2 MG/ML
4 INJECTION INTRAMUSCULAR; INTRAVENOUS
Status: DISCONTINUED | OUTPATIENT
Start: 2024-05-31 | End: 2024-05-31 | Stop reason: HOSPADM

## 2024-05-31 RX ORDER — HEPARIN SODIUM 1000 [USP'U]/ML
INJECTION, SOLUTION INTRAVENOUS; SUBCUTANEOUS PRN
Status: DISCONTINUED | OUTPATIENT
Start: 2024-05-31 | End: 2024-05-31 | Stop reason: HOSPADM

## 2024-05-31 RX ORDER — ONDANSETRON 2 MG/ML
INJECTION INTRAMUSCULAR; INTRAVENOUS PRN
Status: DISCONTINUED | OUTPATIENT
Start: 2024-05-31 | End: 2024-05-31 | Stop reason: SDUPTHER

## 2024-05-31 RX ORDER — OXYCODONE HYDROCHLORIDE 5 MG/1
5 TABLET ORAL
Status: DISCONTINUED | OUTPATIENT
Start: 2024-05-31 | End: 2024-05-31 | Stop reason: HOSPADM

## 2024-05-31 RX ORDER — DEXAMETHASONE SODIUM PHOSPHATE 4 MG/ML
INJECTION, SOLUTION INTRA-ARTICULAR; INTRALESIONAL; INTRAMUSCULAR; INTRAVENOUS; SOFT TISSUE PRN
Status: DISCONTINUED | OUTPATIENT
Start: 2024-05-31 | End: 2024-05-31 | Stop reason: SDUPTHER

## 2024-05-31 RX ORDER — SODIUM CHLORIDE 0.9 % (FLUSH) 0.9 %
5-40 SYRINGE (ML) INJECTION EVERY 12 HOURS SCHEDULED
Status: DISCONTINUED | OUTPATIENT
Start: 2024-05-31 | End: 2024-05-31 | Stop reason: HOSPADM

## 2024-05-31 RX ORDER — SODIUM CHLORIDE, SODIUM LACTATE, POTASSIUM CHLORIDE, CALCIUM CHLORIDE 600; 310; 30; 20 MG/100ML; MG/100ML; MG/100ML; MG/100ML
INJECTION, SOLUTION INTRAVENOUS CONTINUOUS
Status: DISCONTINUED | OUTPATIENT
Start: 2024-05-31 | End: 2024-05-31 | Stop reason: HOSPADM

## 2024-05-31 RX ORDER — LABETALOL HYDROCHLORIDE 5 MG/ML
10 INJECTION, SOLUTION INTRAVENOUS
Status: DISCONTINUED | OUTPATIENT
Start: 2024-05-31 | End: 2024-05-31 | Stop reason: HOSPADM

## 2024-05-31 RX ORDER — HEPARIN SODIUM 200 [USP'U]/100ML
INJECTION, SOLUTION INTRAVENOUS PRN
Status: DISCONTINUED | OUTPATIENT
Start: 2024-05-31 | End: 2024-05-31 | Stop reason: SDUPTHER

## 2024-05-31 RX ORDER — SODIUM CHLORIDE 0.9 % (FLUSH) 0.9 %
5-40 SYRINGE (ML) INJECTION PRN
Status: DISCONTINUED | OUTPATIENT
Start: 2024-05-31 | End: 2024-05-31 | Stop reason: HOSPADM

## 2024-05-31 RX ORDER — ROCURONIUM BROMIDE 10 MG/ML
INJECTION, SOLUTION INTRAVENOUS PRN
Status: DISCONTINUED | OUTPATIENT
Start: 2024-05-31 | End: 2024-05-31 | Stop reason: SDUPTHER

## 2024-05-31 RX ORDER — ISOPROTERENOL HYDROCHLORIDE 0.2 MG/ML
INJECTION, SOLUTION INTRAVENOUS PRN
Status: DISCONTINUED | OUTPATIENT
Start: 2024-05-31 | End: 2024-05-31 | Stop reason: SDUPTHER

## 2024-05-31 RX ORDER — NALOXONE HYDROCHLORIDE 0.4 MG/ML
INJECTION, SOLUTION INTRAMUSCULAR; INTRAVENOUS; SUBCUTANEOUS PRN
Status: DISCONTINUED | OUTPATIENT
Start: 2024-05-31 | End: 2024-05-31 | Stop reason: HOSPADM

## 2024-05-31 RX ORDER — MEPERIDINE HYDROCHLORIDE 25 MG/ML
12.5 INJECTION INTRAMUSCULAR; INTRAVENOUS; SUBCUTANEOUS EVERY 5 MIN PRN
Status: DISCONTINUED | OUTPATIENT
Start: 2024-05-31 | End: 2024-05-31 | Stop reason: HOSPADM

## 2024-05-31 RX ORDER — PROTAMINE SULFATE 10 MG/ML
INJECTION, SOLUTION INTRAVENOUS PRN
Status: DISCONTINUED | OUTPATIENT
Start: 2024-05-31 | End: 2024-05-31 | Stop reason: SDUPTHER

## 2024-05-31 RX ORDER — ACETAMINOPHEN 325 MG/1
650 TABLET ORAL EVERY 4 HOURS PRN
Status: DISCONTINUED | OUTPATIENT
Start: 2024-05-31 | End: 2024-05-31 | Stop reason: HOSPADM

## 2024-05-31 RX ORDER — HYDRALAZINE HYDROCHLORIDE 20 MG/ML
10 INJECTION INTRAMUSCULAR; INTRAVENOUS
Status: DISCONTINUED | OUTPATIENT
Start: 2024-05-31 | End: 2024-05-31 | Stop reason: HOSPADM

## 2024-05-31 RX ORDER — HYDROMORPHONE HYDROCHLORIDE 1 MG/ML
0.5 INJECTION, SOLUTION INTRAMUSCULAR; INTRAVENOUS; SUBCUTANEOUS EVERY 5 MIN PRN
Status: DISCONTINUED | OUTPATIENT
Start: 2024-05-31 | End: 2024-05-31 | Stop reason: HOSPADM

## 2024-05-31 RX ORDER — FENTANYL CITRATE 50 UG/ML
INJECTION, SOLUTION INTRAMUSCULAR; INTRAVENOUS PRN
Status: DISCONTINUED | OUTPATIENT
Start: 2024-05-31 | End: 2024-05-31 | Stop reason: SDUPTHER

## 2024-05-31 RX ORDER — FUROSEMIDE 20 MG/1
20 TABLET ORAL DAILY
Qty: 5 TABLET | Refills: 0 | Status: SHIPPED | OUTPATIENT
Start: 2024-05-31 | End: 2024-06-06 | Stop reason: CLARIF

## 2024-05-31 RX ORDER — PROPOFOL 10 MG/ML
INJECTION, EMULSION INTRAVENOUS PRN
Status: DISCONTINUED | OUTPATIENT
Start: 2024-05-31 | End: 2024-05-31 | Stop reason: SDUPTHER

## 2024-05-31 RX ORDER — FUROSEMIDE 10 MG/ML
INJECTION INTRAMUSCULAR; INTRAVENOUS PRN
Status: DISCONTINUED | OUTPATIENT
Start: 2024-05-31 | End: 2024-05-31 | Stop reason: SDUPTHER

## 2024-05-31 RX ORDER — PROCHLORPERAZINE EDISYLATE 5 MG/ML
5 INJECTION INTRAMUSCULAR; INTRAVENOUS
Status: DISCONTINUED | OUTPATIENT
Start: 2024-05-31 | End: 2024-05-31 | Stop reason: HOSPADM

## 2024-05-31 RX ORDER — LIDOCAINE HYDROCHLORIDE 10 MG/ML
1 INJECTION, SOLUTION EPIDURAL; INFILTRATION; INTRACAUDAL; PERINEURAL
Status: DISCONTINUED | OUTPATIENT
Start: 2024-05-31 | End: 2024-05-31 | Stop reason: HOSPADM

## 2024-05-31 RX ADMIN — ROCURONIUM BROMIDE 20 MG: 10 INJECTION, SOLUTION INTRAVENOUS at 11:46

## 2024-05-31 RX ADMIN — HEPARIN SODIUM 5000 UNITS: 200 INJECTION, SOLUTION INTRAVENOUS at 11:56

## 2024-05-31 RX ADMIN — ISOPROTERENOL HYDROCHLORIDE 10 MCG: 0.2 INJECTION, SOLUTION INTRAMUSCULAR; INTRAVENOUS at 13:10

## 2024-05-31 RX ADMIN — HEPARIN SODIUM 2000 ML/HR: 200 INJECTION, SOLUTION INTRAVENOUS at 11:58

## 2024-05-31 RX ADMIN — ISOPROTERENOL HYDROCHLORIDE 10 MCG: 0.2 INJECTION, SOLUTION INTRAMUSCULAR; INTRAVENOUS at 13:05

## 2024-05-31 RX ADMIN — DEXAMETHASONE SODIUM PHOSPHATE 4 MG: 4 INJECTION, SOLUTION INTRAMUSCULAR; INTRAVENOUS at 11:45

## 2024-05-31 RX ADMIN — ONDANSETRON 4 MG: 2 INJECTION INTRAMUSCULAR; INTRAVENOUS at 11:10

## 2024-05-31 RX ADMIN — ROCURONIUM BROMIDE 10 MG: 10 INJECTION, SOLUTION INTRAVENOUS at 12:40

## 2024-05-31 RX ADMIN — FUROSEMIDE 20 MG: 10 INJECTION, SOLUTION INTRAMUSCULAR; INTRAVENOUS at 13:13

## 2024-05-31 RX ADMIN — SUGAMMADEX 100 MG: 100 INJECTION, SOLUTION INTRAVENOUS at 13:22

## 2024-05-31 RX ADMIN — PROTAMINE SULFATE 30 MG: 10 INJECTION, SOLUTION INTRAVENOUS at 13:13

## 2024-05-31 RX ADMIN — PHENYLEPHRINE HYDROCHLORIDE 100 MCG: 10 INJECTION, SOLUTION INTRAVENOUS at 13:11

## 2024-05-31 RX ADMIN — HEPARIN SODIUM 10000 UNITS: 200 INJECTION, SOLUTION INTRAVENOUS at 11:27

## 2024-05-31 RX ADMIN — ISOPROTERENOL HYDROCHLORIDE 10 MCG: 0.2 INJECTION, SOLUTION INTRAMUSCULAR; INTRAVENOUS at 13:07

## 2024-05-31 RX ADMIN — PROPOFOL 120 MG: 10 INJECTION, EMULSION INTRAVENOUS at 10:56

## 2024-05-31 RX ADMIN — FENTANYL CITRATE 50 MCG: 50 INJECTION, SOLUTION INTRAMUSCULAR; INTRAVENOUS at 10:56

## 2024-05-31 RX ADMIN — FENTANYL CITRATE 50 MCG: 50 INJECTION, SOLUTION INTRAMUSCULAR; INTRAVENOUS at 13:22

## 2024-05-31 RX ADMIN — ISOPROTERENOL HYDROCHLORIDE 10 MCG: 0.2 INJECTION, SOLUTION INTRAMUSCULAR; INTRAVENOUS at 13:09

## 2024-05-31 RX ADMIN — ROCURONIUM BROMIDE 50 MG: 10 INJECTION, SOLUTION INTRAVENOUS at 10:56

## 2024-05-31 RX ADMIN — ISOPROTERENOL HYDROCHLORIDE 10 MCG: 0.2 INJECTION, SOLUTION INTRAMUSCULAR; INTRAVENOUS at 13:04

## 2024-05-31 RX ADMIN — ISOPROTERENOL HYDROCHLORIDE 10 MCG: 0.2 INJECTION, SOLUTION INTRAMUSCULAR; INTRAVENOUS at 13:06

## 2024-05-31 RX ADMIN — ROCURONIUM BROMIDE 10 MG: 10 INJECTION, SOLUTION INTRAVENOUS at 12:17

## 2024-05-31 RX ADMIN — SODIUM CHLORIDE, SODIUM LACTATE, POTASSIUM CHLORIDE, AND CALCIUM CHLORIDE: .6; .31; .03; .02 INJECTION, SOLUTION INTRAVENOUS at 10:48

## 2024-05-31 RX ADMIN — ISOPROTERENOL HYDROCHLORIDE 10 MCG: 0.2 INJECTION, SOLUTION INTRAMUSCULAR; INTRAVENOUS at 13:11

## 2024-05-31 RX ADMIN — ISOPROTERENOL HYDROCHLORIDE 10 MCG: 0.2 INJECTION, SOLUTION INTRAMUSCULAR; INTRAVENOUS at 13:08

## 2024-05-31 RX ADMIN — HEPARIN SODIUM 3000 UNITS: 200 INJECTION, SOLUTION INTRAVENOUS at 11:41

## 2024-05-31 NOTE — PROGRESS NOTES
Patient arrived from OR to PACU # 3 s/p  Ablation A-fib/flutter per . Attached to PACU monitoring device report received from CRNA who stated no problems intraoperatively. Patient awake denies pain, right groin soft, no hematoma +PP  Purewick in place. SR on tele.

## 2024-05-31 NOTE — INTERVAL H&P NOTE
Update History & Physical    The patient's History and Physical of May 2, by me was reviewed with the patient and I examined the patient. There was no change. The surgical site was confirmed by the patient and me.     Plan: The risks, benefits, expected outcome, and alternative to the recommended procedure have been discussed with the patient. Patient understands and wants to proceed with the procedure.     Electronically signed by Darrick Kolb MD on 5/31/2024 at 10:13 AM

## 2024-05-31 NOTE — PROGRESS NOTES
Cath Lab Pre Procedure Flowsheet    Plan of Care:     Hemodynamics and cardiac rhythm will remain stable.   Comfort level will be maintained.   Respiratory function will remain adequate.   Pt/family will verbalize understanding of the procedure.   Procedure will be tolerated without complications.   Patient will recover from procedure without complications.   ID armband on patient and identification verified.   Informed consent obtained.   Non invasive blood pressure cuff applied, monitoring initiated.   EKG pads and pulse oximeter applied, monitoring initiated.   Instructions given. Patient and / or family verbalize understanding.   H&P will be documented by physician in [facility].     Pre-procedure:    NPO Status: Pt has been NPO since midnight. .    Contrast / IV Dye Allergy:  None      Pregnancy Test: No. Patient is postmenopausal.     Prep Sites: Groin(s)    Lucio's Test: N/A    Pulses:  Bilat radial 2, Bilat Femoral 2, Bilat DP 2, Right PT 1, Left PT 2     Anticoagulants: Xarelto. Last Dose: 5/29/2024.  Any missed doses:  No..     Antiplatelets: None.     Chief Complaint:  Arrythmia    Diabetic: No    Pre EKG Rhythm: Sinus Patric with 1st degree AV block    Pre SBP:  106    IV access: PIV #20 LH    Pre-procedure blood work collected by: [id number] by CHARLIE Landeros RN    [facility] Scale:  N/A

## 2024-05-31 NOTE — ANESTHESIA POSTPROCEDURE EVALUATION
Department of Anesthesiology  Postprocedure Note    Patient: Mitra Ornelas  MRN: [id number]  YOB: 1950  Date of evaluation: 5/31/2024    Procedure Summary       Date: 05/31/24 Room / Location: [facility] SPECIAL PROCEDURES 3 / [facility] CARDIAC CATH LAB    Anesthesia Start: 1048 Anesthesia Stop: 1338    Procedure: Ablation A-fib/flutter Diagnosis:       Paroxysmal atrial fibrillation (HCC)      (Paroxysmal atrial fibrillation (HCC) [I48.0])    Providers: Darrick Kolb MD Responsible Provider: Kristofer sEtrada MD    Anesthesia Type: General ASA Status: 3            Anesthesia Type: General    Kishor Phase I: Kishor Score: 10    Kishor Phase II:      Anesthesia Post Evaluation    Patient location during evaluation: PACU  Patient participation: complete - patient participated  Level of consciousness: awake and alert  Airway patency: patent  Nausea & Vomiting: no nausea and no vomiting  Cardiovascular status: hemodynamically stable  Respiratory status: acceptable  Hydration status: euvolemic  Multimodal analgesia pain management approach  Pain management: satisfactory to patient    There were no known notable events for this encounter.

## 2024-05-31 NOTE — ANESTHESIA PRE PROCEDURE
input(s): \"POCGLU\", \"POCNA\", \"POCK\", \"POCCL\", \"POCBUN\", \"POCHEMO\", \"POCHCT\" in the last 72 hours.    Coags:   Lab Results   Component Value Date/Time    PROTIME 24.4 05/24/2024 09:44 AM    INR 2.19 05/24/2024 09:44 AM       HCG (If Applicable): No results found for: \"PREGTESTUR\", \"PREGSERUM\", \"HCG\", \"HCGQUANT\"     ABGs: No results found for: \"PHART\", \"PO2ART\", \"YMS8DUO\", \"GTW8SRR\", \"BEART\", \"S3WUZUGM\"     Type & Screen (If Applicable):  No results found for: \"LABABO\"    Drug/Infectious Status (If Applicable):  No results found for: \"HIV\", \"HEPCAB\"    COVID-19 Screening (If Applicable): No results found for: \"COVID19\"        Anesthesia Evaluation  Patient summary reviewed and Nursing notes reviewed   no history of anesthetic complications:   Airway: Mallampati: II  TM distance: >3 FB   Neck ROM: full  Mouth opening: > = 3 FB   Dental: normal exam         Pulmonary:normal exam    (+)     sleep apnea:                                  Cardiovascular:    (+) hypertension:, dysrhythmias: atrial flutter                  Neuro/Psych:   (+) neuromuscular disease:, psychiatric history:            GI/Hepatic/Renal:   (+) morbid obesity          Endo/Other: Negative Endo/Other ROS                    Abdominal:             Vascular: negative vascular ROS.         Other Findings:       Anesthesia Plan      general     ASA 3       Induction: intravenous.    MIPS: Postoperative opioids intended and Prophylactic antiemetics administered.  Anesthetic plan and risks discussed with patient.      Plan discussed with CRNA.    Attending anesthesiologist reviewed and agrees with Preprocedure content            Kristofer Estrada MD   5/31/2024

## 2024-05-31 NOTE — PROGRESS NOTES
PACU Transfer to Floor Note    Procedure(s):  Ablation A-fib/flutter    Current Allergies: Lisinopril    Pt meets criteria as per Kelly Score and ASPAN Standards to transfer to next phase of care.     No results for input(s): \"POCGLU\" in the last 72 hours.    Vitals:    05/31/24 1433   BP: 114/66   Pulse: 72   Resp: 13   Temp: 97.4   SpO2: 100%     Vitals within 20% of pt's admission vitals as per KELLY SCORE    SpO2: 98 %    O2 Flow Rate (L/min): 2 L/min      Intake/Output Summary (Last 24 hours) at 5/31/2024 1452  Last data filed at 5/31/2024 1321  Gross per 24 hour   Intake --   Output 20 ml   Net -20 ml       Pain assessment:  none         Patient was assessed for alterations to skin integrity. There were not alterations observed.    Is patient incontinent: no    Handoff report given at bedside.   Family updated and directed to pt room      5/31/2024 2:52 PM

## 2024-05-31 NOTE — DISCHARGE INSTRUCTIONS
[facility]  Electrophysiology    Dr. Darrick Su, CNP  Fatoumata Mac, CNP  Shakira Nunez, RN  [phone]    Cardiac Catheter Ablation  Discharge Instructions      WHAT YOU SHOULD KNOW:   Your heart has a special electrical system built into it that controls your heart rhythm. Sometimes there is a problem with this electrical system in the heart muscle. This problem may cause an arrhythmia, or abnormal heart rhythm. If medicine does not correct the problem, or if you do not wish to take medicines long-term, you may need a cardiac ablation. An ablation may also be called a catheter ablation, or a radiofrequency ablation.    An ablation procedure is usually done at the same time as an electrophysiology study. This test is used to \"map out\" the electrical pathways in your heart that control your heart rhythm. This test helps your doctor find the exact spot where the ablation needs to be done. During an ablation, energy is sent through a special catheter to the area of your heart that has the electrical problem. This energy causes a tiny area of the heart muscle to scar, stopping the electrical problem and allowing your heart to beat regularly. Ask your caregiver for more information about your heart problem, and tests and treatments that may be done for it.       AFTER YOU LEAVE:     Home care:    For the next 24 hours please hold pressure to your groin when you cough, sneeze, or change positions.    Activity: After your ablation, rest for one to two days. Avoid using stairs for a few days. When you must use stairs, step up with the leg that was not used for the ablation. Straighten this leg to move the other leg up to the next step without putting stress on it.    No strenuous activity for 1 week, no lifting greater than 10lbs for 1 week.     Do not strain while having a bowel movement. If you are constipated, take an over-the-counter stool softener such as Metamucil or Citracel.  Physical Therapy      Patient Name:  Kamran Huerta   MRN:  [id number]    Patient not seen today for PT re-eval following procedure yesterday. PT attempted to see pt twice today and both times he refused 2/2 increased pain. Will follow-up as schedule permits.             Bathing and site care:       You may remove the dressing to the groin and shower after 24 hours.  Please do not submerge in water for at least 1 week or until the site heals completely.     After your shower you may place a band aid to the site until it heals.     It is normal to feel a small lump under the groin site after the procedure. The collagen plugs used to close the puncture site will dissolve over 30 days.    If the site is oozing or bleeding slightly, place a small bandage over it to protect your clothes. Change the bandage daily, and more often if it gets wet or dirty. Once the site has stopped oozing, you may leave it uncovered.     It is normal to have a bruise and soreness where the ablation catheter went in. Draw a line with a pen around the edges of the bruise. This will show you if the bruise starts to get bigger. Gravity may cause the bruise color to travel down your leg. If this happens, call your caregiver.     If the catheter site starts to bleed, use your hand to put pressure on the bandage. If you do not have a bandage, use a clean cloth to hold pressure over the puncture site. It is better if someone else holds pressure for you. While holding pressure, call your caregiver. Hold the pressure for 30 minutes, even after the bleeding has stopped. After the bleeding has stopped, lie flat for at least an hour. If the bleeding does not stop within 15 minutes of holding pressure, go to the nearest hospital or clinic. Do not walk, and do not drive yourself. If you continue to bleed, call 911.      Diet:    Food and liquids: You may eat your usual diet when you get home. Drink plenty of liquids. Most people should drink at least eight (8-ounce) glasses of water each day. Limit the amount of caffeine you drink such as coffee, tea, and soda. Do not drink alcohol for 24 hours after the test. Follow your caregiver's advice if you need to change the amount of liquids that you drink.     Please  medicines until you discuss it with your caregiver.     Aspirin or blood thinners: Your caregiver may want you to take aspirin or another blood thinner for a period of time after your ablation. This is to keep blood clots from forming in your heart. It is important to take these medications exactly as prescribed.    Over-the-counter pain medicine: You may use over-the-counter pain medicines, such as ibuprofen or acetaminophen, for pain or soreness. These medicines are safe for most people to use. However, they can cause serious problems when they are not used correctly. People with certain medical conditions, or using certain other medicines are at a higher risk for problems. Using too much, or using these medicines for longer than the label says can also cause problems. Follow directions on the label carefully. If you have questions, talk to your caregiver.       FOLLOW-UP APPOINTMENTS:    Please keep all follow-up appointments. If you need to reschedule, please contact the office with any questions or concerns at [phone].

## 2024-05-31 NOTE — PROGRESS NOTES
Patient/family given discharge instructions. Patient/family verbalize understanding of discharge instructions, all questions addressed, written   copy given to patient/family. Pt transferred to vehicle via wheelchair to be discharged home with family.

## 2024-06-03 ENCOUNTER — TELEPHONE (OUTPATIENT)
Dept: CARDIOLOGY CLINIC | Age: 74
End: 2024-06-03

## 2024-06-03 LAB — INR BLD: 1.4 (ref 0.84–1.16)

## 2024-06-03 NOTE — TELEPHONE ENCOUNTER
Post Ablation Follow up Phone Calls    Date of Procedure: 5/31/24    Procedure: AF ablation    Date of Call: 6/3/24    Catheter Ablation  Sore throat or difficulty swallowing? (This is normal for a couple of days post procedure - if it continues, we want to know).    Ongoing CP? (This is normal for a couple of days post procedure - if it continues, we want to know)    Ongoing SOB? Any s/s of heart failure? Swelling in feet ankles or abdomen? Unable to lie flat in bed? SOB with exertion or all the time?    How does the groin look? Any swelling, bleeding or drainage? (A small lump is ok.)    Remind no heavy lifting >10# for 7 days post procedure.     Make sure they miss NO dose of blood thinners if they are on one.    Make sure they filled any medications that were prescribed (Protonix, Lasix, flecainide etc.)    Remind of follow up appointment.    Pt is feeling very well since the ablation. Denies SOB / ZAYAS, chest discomfort, sore throat, swallowing difficulties, fever / chills, orthopnea, or swelling since the ablation.  Reports groin is soft without swelling or drainage.  Reports compliance of meds, including OAC.  Reminded of lifting restriction, confirmed f/u OV with NP.

## 2024-06-06 ENCOUNTER — OFFICE VISIT (OUTPATIENT)
Dept: CARDIOLOGY CLINIC | Age: 74
End: 2024-06-06
Payer: MEDICARE

## 2024-06-06 VITALS
DIASTOLIC BLOOD PRESSURE: 60 MMHG | HEART RATE: 65 BPM | SYSTOLIC BLOOD PRESSURE: 120 MMHG | BODY MASS INDEX: 34.78 KG/M2 | WEIGHT: 209 LBS

## 2024-06-06 DIAGNOSIS — I48.0 PAROXYSMAL ATRIAL FIBRILLATION (HCC): Primary | ICD-10-CM

## 2024-06-06 DIAGNOSIS — I48.3 TYPICAL ATRIAL FLUTTER (HCC): ICD-10-CM

## 2024-06-06 DIAGNOSIS — Z79.899 ENCOUNTER FOR MONITORING FLECAINIDE THERAPY: ICD-10-CM

## 2024-06-06 DIAGNOSIS — I10 ESSENTIAL HYPERTENSION: ICD-10-CM

## 2024-06-06 DIAGNOSIS — Z79.01 ON CONTINUOUS ORAL ANTICOAGULATION: ICD-10-CM

## 2024-06-06 DIAGNOSIS — R00.2 PALPITATIONS: ICD-10-CM

## 2024-06-06 DIAGNOSIS — Z51.81 ENCOUNTER FOR MONITORING FLECAINIDE THERAPY: ICD-10-CM

## 2024-06-06 PROCEDURE — 1036F TOBACCO NON-USER: CPT | Performed by: NURSE PRACTITIONER

## 2024-06-06 PROCEDURE — 93000 ELECTROCARDIOGRAM COMPLETE: CPT | Performed by: NURSE PRACTITIONER

## 2024-06-06 PROCEDURE — 3078F DIAST BP <80 MM HG: CPT | Performed by: NURSE PRACTITIONER

## 2024-06-06 PROCEDURE — G8399 PT W/DXA RESULTS DOCUMENT: HCPCS | Performed by: NURSE PRACTITIONER

## 2024-06-06 PROCEDURE — G8427 DOCREV CUR MEDS BY ELIG CLIN: HCPCS | Performed by: NURSE PRACTITIONER

## 2024-06-06 PROCEDURE — 1090F PRES/ABSN URINE INCON ASSESS: CPT | Performed by: NURSE PRACTITIONER

## 2024-06-06 PROCEDURE — 3074F SYST BP LT 130 MM HG: CPT | Performed by: NURSE PRACTITIONER

## 2024-06-06 PROCEDURE — G8417 CALC BMI ABV UP PARAM F/U: HCPCS | Performed by: NURSE PRACTITIONER

## 2024-06-06 PROCEDURE — 1123F ACP DISCUSS/DSCN MKR DOCD: CPT | Performed by: NURSE PRACTITIONER

## 2024-06-06 PROCEDURE — 3017F COLORECTAL CA SCREEN DOC REV: CPT | Performed by: NURSE PRACTITIONER

## 2024-06-06 PROCEDURE — 99215 OFFICE O/P EST HI 40 MIN: CPT | Performed by: NURSE PRACTITIONER

## 2024-06-20 DIAGNOSIS — I48.0 PAROXYSMAL ATRIAL FIBRILLATION (HCC): ICD-10-CM

## 2024-06-20 DIAGNOSIS — I10 ESSENTIAL HYPERTENSION: ICD-10-CM

## 2024-06-21 RX ORDER — METOPROLOL SUCCINATE 100 MG/1
100 TABLET, EXTENDED RELEASE ORAL DAILY
Qty: 90 TABLET | Refills: 0 | Status: SHIPPED | OUTPATIENT
Start: 2024-06-21

## 2024-07-31 NOTE — TELEPHONE ENCOUNTER
Requested Prescriptions     Pending Prescriptions Disp Refills    XARELTO 20 MG TABS tablet [Pharmacy Med Name: XARELTO 20 MG TABLET] 30 tablet      Sig: TAKE 1 TABLET BY MOUTH DAILY WITH BREAKFAST              Number: 30    Refills: 5    Last Office Visit: 6/6/2024     Next Office Visit: 8/29/2024     Last Refill: 06/29/2023    Last Labs: 05/24/2024 BMP/CBC/ PT INR

## 2024-08-02 RX ORDER — RIVAROXABAN 20 MG/1
20 TABLET, FILM COATED ORAL DAILY
Qty: 30 TABLET | Refills: 0 | Status: SHIPPED | OUTPATIENT
Start: 2024-08-02

## 2024-08-29 ENCOUNTER — OFFICE VISIT (OUTPATIENT)
Dept: CARDIOLOGY CLINIC | Age: 74
End: 2024-08-29
Payer: MEDICARE

## 2024-08-29 VITALS
DIASTOLIC BLOOD PRESSURE: 70 MMHG | HEART RATE: 64 BPM | WEIGHT: 205 LBS | SYSTOLIC BLOOD PRESSURE: 102 MMHG | BODY MASS INDEX: 34.11 KG/M2

## 2024-08-29 DIAGNOSIS — Z51.81 ENCOUNTER FOR MONITORING FLECAINIDE THERAPY: ICD-10-CM

## 2024-08-29 DIAGNOSIS — I10 ESSENTIAL HYPERTENSION: ICD-10-CM

## 2024-08-29 DIAGNOSIS — I10 HYPERTENSION, UNSPECIFIED TYPE: ICD-10-CM

## 2024-08-29 DIAGNOSIS — Z79.01 ON CONTINUOUS ORAL ANTICOAGULATION: ICD-10-CM

## 2024-08-29 DIAGNOSIS — Z79.899 ENCOUNTER FOR MONITORING FLECAINIDE THERAPY: ICD-10-CM

## 2024-08-29 DIAGNOSIS — I48.3 TYPICAL ATRIAL FLUTTER (HCC): ICD-10-CM

## 2024-08-29 DIAGNOSIS — I48.0 PAROXYSMAL ATRIAL FIBRILLATION (HCC): Primary | ICD-10-CM

## 2024-08-29 DIAGNOSIS — G47.33 OSA (OBSTRUCTIVE SLEEP APNEA): ICD-10-CM

## 2024-08-29 PROCEDURE — 99214 OFFICE O/P EST MOD 30 MIN: CPT | Performed by: INTERNAL MEDICINE

## 2024-08-29 PROCEDURE — 1036F TOBACCO NON-USER: CPT | Performed by: INTERNAL MEDICINE

## 2024-08-29 PROCEDURE — 1123F ACP DISCUSS/DSCN MKR DOCD: CPT | Performed by: INTERNAL MEDICINE

## 2024-08-29 PROCEDURE — G8427 DOCREV CUR MEDS BY ELIG CLIN: HCPCS | Performed by: INTERNAL MEDICINE

## 2024-08-29 PROCEDURE — 1090F PRES/ABSN URINE INCON ASSESS: CPT | Performed by: INTERNAL MEDICINE

## 2024-08-29 PROCEDURE — 3074F SYST BP LT 130 MM HG: CPT | Performed by: INTERNAL MEDICINE

## 2024-08-29 PROCEDURE — 3078F DIAST BP <80 MM HG: CPT | Performed by: INTERNAL MEDICINE

## 2024-08-29 PROCEDURE — 3017F COLORECTAL CA SCREEN DOC REV: CPT | Performed by: INTERNAL MEDICINE

## 2024-08-29 PROCEDURE — 93000 ELECTROCARDIOGRAM COMPLETE: CPT | Performed by: INTERNAL MEDICINE

## 2024-08-29 PROCEDURE — G8417 CALC BMI ABV UP PARAM F/U: HCPCS | Performed by: INTERNAL MEDICINE

## 2024-08-29 PROCEDURE — G8399 PT W/DXA RESULTS DOCUMENT: HCPCS | Performed by: INTERNAL MEDICINE

## 2024-09-04 DIAGNOSIS — I48.0 PAROXYSMAL ATRIAL FIBRILLATION (HCC): ICD-10-CM

## 2024-09-04 DIAGNOSIS — I10 ESSENTIAL HYPERTENSION: ICD-10-CM

## 2024-09-04 RX ORDER — METOPROLOL SUCCINATE 100 MG/1
100 TABLET, EXTENDED RELEASE ORAL DAILY
Qty: 90 TABLET | Refills: 3 | Status: SHIPPED | OUTPATIENT
Start: 2024-09-04

## 2024-09-04 RX ORDER — TRIAMTERENE/HYDROCHLOROTHIAZID 37.5-25 MG
1 TABLET ORAL DAILY
Qty: 90 TABLET | Refills: 3 | Status: SHIPPED | OUTPATIENT
Start: 2024-09-04

## 2024-09-04 NOTE — TELEPHONE ENCOUNTER
Requested Prescriptions     Pending Prescriptions Disp Refills    metoprolol succinate (TOPROL XL) 100 MG extended release tablet [Pharmacy Med Name: Metoprolol Succinate ER Oral Tablet Extended Release 24 Hour 100 MG] 90 tablet 3     Sig: TAKE 1 TABLET EVERY DAY            Checked Correct Pharmacy: Yes    Any changes since last refill? No     Number: 90    Refills: 3    Last Office Visit: 8/29/2024     Next Office Visit:10.10.2024      Last Labs: 05.24.2024

## 2024-09-04 NOTE — TELEPHONE ENCOUNTER
Requested Prescriptions     Pending Prescriptions Disp Refills    triamterene-hydroCHLOROthiazide (MAXZIDE-25) 37.5-25 MG per tablet [Pharmacy Med Name: Triamterene-HCTZ Oral Tablet 37.5-25 MG] 90 tablet 3     Sig: TAKE 1 TABLET EVERY DAY            Checked Correct Pharmacy: Yes    Any changes since last refill? No     Number: 90    Refills: 3    Last Office Visit: 8/29/2024     Next Office Visit: 10/10/2024         Last Labs: 05.24.2024

## 2024-09-13 RX ORDER — RIVAROXABAN 20 MG/1
20 TABLET, FILM COATED ORAL DAILY
Qty: 30 TABLET | Refills: 0 | Status: SHIPPED | OUTPATIENT
Start: 2024-09-13

## 2024-09-18 RX ORDER — FLECAINIDE ACETATE 100 MG/1
100 TABLET ORAL 2 TIMES DAILY
Qty: 180 TABLET | Refills: 3 | Status: SHIPPED | OUTPATIENT
Start: 2024-09-18

## 2024-10-04 NOTE — PATIENT INSTRUCTIONS
Take 50 mg Flecainide twice a day  Get labs     Thank you for choosing [facility] for your cardiac care.    During your visit today, we reviewed and confirmed your cardiac medications along with  medication prescribed by your other healthcare team members. Please be sure to discuss any  changes to medication with your providers.    Please bring a list of ALL medications (or the bottles) with you to EVERY appointment.  Also include vitamins and over-the-counter medications.    If you need refills for any cardiac medications, please call your pharmacy and they will reach out to us electronically.    Did your provider order testing today? If yes, then you will receive your results in three  possible ways. You can receive a [employer] message, a phone call, or letter in the mail. Please  note, if you are an active [employer] user, some of your testing will be available within 1-2 days.    Finally, please know that it is good for your heart to exercise and follow a healthy, low-fat diet  as advised by your physician and health care providers.    If you are experiencing a medical emergency, please call 911 immediately.    It's easy to register for a [employer] account if you don't already have one. With a [employer]  account you can manage your health record, view test results, schedule appointments and  more.     Dr. Jara's clinical staff can be reached at the following phone number: [phone]    If any cardiac testing is ordered, please contact central scheduling at [phone] to get your test scheduled.

## 2024-10-04 NOTE — PROGRESS NOTES
[facility]     Outpatient Cardiology         Patient Name:  Mitra Ornelas  Primary Care Physician: Shelby Jennings, APRN - CNP  10/10/24     Assessment & Plan    Assessment / Plan:     PAF status post ablation therapy.  Currently maintaining sinus rhythm.  Currently taking flecainide 100 mg once a day.  Advised to take flecainide 50 mg p.o. twice daily.  Continue Xarelto.  No bleeding  Fatigue-check labs.  Check CBC, BMP and TSH.  If labs are okay, will consider reducing dose of Toprol-XL to 50 mg daily.  The higher dose of Toprol-XL may be contributing to the fatigue.  Essential hypertension-well-controlled.  Continue Maxide and Toprol-XL.  AMNA-using CPAP regularly  Follow-up in 6 months or sooner if needed                Chief Complaint:     Chief Complaint   Patient presents with    1 Year Follow Up     1 year follow up from [facility]        History of Present Illness:       HPI     Mitra Ornelas is a 74 y.o. female with PMH of Afib S/P ablation, HTN, Palpitations and AMNA here for a follow up.    She feels  does not have as much energy after her ablation     Patient denies any chest pain, shortness of breath, palpitations, presyncope or syncope. No TIA. No claudication. No recent hospitalizations    Reviewed medications, tests and labs  Flecainide 50 mg twice a day  CBC, BMP, TSH    PMH  Past Medical History:   Diagnosis Date    Anxiety     Bilateral leg edema     Chest pressure     Dizziness     Herpes     opthalmic    Hypertension     Insomnia     New onset a-fib (HCC)     Obstructive sleep apnea syndrome 07/27/2010    Palpitations     Radial head fracture, closed, left, closed, initial encounter 01/21/2018    Tinnitus        PSH  Past Surgical History:   Procedure Laterality Date    ANKLE SURGERY Left 01/21/2018    LEFT ANKLE OPEN REDUCTION INTERNAL FIXATION    BREAST BIOPSY      COLONOSCOPY N/A 12/17/2021    COLONOSCOPY POLYPECTOMY SNARE/COLD BIOPSY performed by Mario Camarillo MD

## 2024-10-10 ENCOUNTER — OFFICE VISIT (OUTPATIENT)
Dept: CARDIOLOGY CLINIC | Age: 74
End: 2024-10-10

## 2024-10-10 VITALS
WEIGHT: 202 LBS | HEART RATE: 64 BPM | BODY MASS INDEX: 33.61 KG/M2 | OXYGEN SATURATION: 95 % | SYSTOLIC BLOOD PRESSURE: 124 MMHG | DIASTOLIC BLOOD PRESSURE: 70 MMHG

## 2024-10-10 DIAGNOSIS — I10 ESSENTIAL HYPERTENSION: ICD-10-CM

## 2024-10-10 DIAGNOSIS — I48.91 ATRIAL FIBRILLATION, UNSPECIFIED TYPE (HCC): Chronic | ICD-10-CM

## 2024-10-10 DIAGNOSIS — I48.91 ATRIAL FIBRILLATION, UNSPECIFIED TYPE (HCC): Primary | Chronic | ICD-10-CM

## 2024-10-10 DIAGNOSIS — R53.82 CHRONIC FATIGUE: ICD-10-CM

## 2024-10-10 LAB
ANION GAP SERPL CALCULATED.3IONS-SCNC: 12 MMOL/L (ref 3–16)
BUN SERPL-MCNC: 20 MG/DL (ref 7–20)
CALCIUM SERPL-MCNC: 9.7 MG/DL (ref 8.3–10.6)
CHLORIDE SERPL-SCNC: 101 MMOL/L (ref 99–110)
CO2 SERPL-SCNC: 28 MMOL/L (ref 21–32)
CREAT SERPL-MCNC: 1 MG/DL (ref 0.6–1.2)
DEPRECATED RDW RBC AUTO: 15.1 % (ref 12.4–15.4)
GFR SERPLBLD CREATININE-BSD FMLA CKD-EPI: 59 ML/MIN/{1.73_M2}
GLUCOSE SERPL-MCNC: 112 MG/DL (ref 70–99)
HCT VFR BLD AUTO: 43.6 % (ref 36–48)
HGB BLD-MCNC: 14.5 G/DL (ref 12–16)
MCH RBC QN AUTO: 26.8 PG (ref 26–34)
MCHC RBC AUTO-ENTMCNC: 33.2 G/DL (ref 31–36)
MCV RBC AUTO: 80.9 FL (ref 80–100)
PLATELET # BLD AUTO: 364 K/UL (ref 135–450)
PLATELET BLD QL SMEAR: ADEQUATE
PMV BLD AUTO: 7.9 FL (ref 5–10.5)
POTASSIUM SERPL-SCNC: 5.1 MMOL/L (ref 3.5–5.1)
RBC # BLD AUTO: 5.39 M/UL (ref 4–5.2)
SODIUM SERPL-SCNC: 141 MMOL/L (ref 136–145)
TSH SERPL DL<=0.005 MIU/L-ACNC: 1.54 UIU/ML (ref 0.27–4.2)
WBC # BLD AUTO: 9.2 K/UL (ref 4–11)

## 2024-10-10 RX ORDER — FLECAINIDE ACETATE 100 MG/1
50 TABLET ORAL 2 TIMES DAILY
Qty: 90 TABLET | Refills: 3 | Status: SHIPPED | OUTPATIENT
Start: 2024-10-10

## 2024-10-14 ENCOUNTER — TELEPHONE (OUTPATIENT)
Dept: CARDIOLOGY CLINIC | Age: 74
End: 2024-10-14

## 2024-10-14 ENCOUNTER — PATIENT MESSAGE (OUTPATIENT)
Dept: FAMILY MEDICINE CLINIC | Age: 74
End: 2024-10-14

## 2024-10-14 DIAGNOSIS — L20.81 ATOPIC NEURODERMATITIS: Primary | ICD-10-CM

## 2024-10-14 NOTE — TELEPHONE ENCOUNTER
Requested Prescriptions     Pending Prescriptions Disp Refills    rivaroxaban (XARELTO) 20 MG TABS tablet 90 tablet 3     Sig: Take 1 tablet by mouth daily            Checked Correct Pharmacy: Yes    Any changes since last refill? No     Number: 90    Refills: 3    Last Office Visit: 10/10/2024     Next Office Visit: 3/3/2025

## 2024-10-15 RX ORDER — CLOTRIMAZOLE AND BETAMETHASONE DIPROPIONATE 10; .64 MG/G; MG/G
CREAM TOPICAL 2 TIMES DAILY
Qty: 45 G | Refills: 0 | Status: CANCELLED | OUTPATIENT
Start: 2024-10-15

## 2024-10-15 RX ORDER — ALCLOMETASONE DIPROPIONATE 0.5 MG/G
CREAM TOPICAL 2 TIMES DAILY
Qty: 60 G | Refills: 0 | Status: SHIPPED | OUTPATIENT
Start: 2024-10-15 | End: 2024-10-15 | Stop reason: SDUPTHER

## 2024-10-15 RX ORDER — KETOCONAZOLE 20 MG/G
CREAM TOPICAL 2 TIMES DAILY
Qty: 30 G | Refills: 0 | Status: CANCELLED | OUTPATIENT
Start: 2024-10-15

## 2024-10-15 RX ORDER — CLOTRIMAZOLE AND BETAMETHASONE DIPROPIONATE 10; .64 MG/G; MG/G
CREAM TOPICAL 2 TIMES DAILY
Qty: 45 G | Refills: 0 | Status: SHIPPED | OUTPATIENT
Start: 2024-10-15 | End: 2024-10-15 | Stop reason: SDUPTHER

## 2024-10-15 RX ORDER — ALCLOMETASONE DIPROPIONATE 0.5 MG/G
CREAM TOPICAL 2 TIMES DAILY
Qty: 60 G | Refills: 0 | Status: CANCELLED | OUTPATIENT
Start: 2024-10-15

## 2024-10-15 RX ORDER — KETOCONAZOLE 20 MG/G
CREAM TOPICAL 2 TIMES DAILY
Qty: 30 G | Refills: 0 | Status: SHIPPED | OUTPATIENT
Start: 2024-10-15 | End: 2024-10-15 | Stop reason: SDUPTHER

## 2024-10-16 ENCOUNTER — PATIENT MESSAGE (OUTPATIENT)
Dept: FAMILY MEDICINE CLINIC | Age: 74
End: 2024-10-16

## 2024-10-16 ENCOUNTER — TELEPHONE (OUTPATIENT)
Dept: ADMINISTRATIVE | Age: 74
End: 2024-10-16

## 2024-10-16 NOTE — TELEPHONE ENCOUNTER
Submitted PA for Alclometasone Dipropionate 0.05% cream  Via CMM Key: [id number] STATUS: APPROVED; letter attached.  Coverage Starts on: 1/1/2024 12:00:00 AM  Coverage Ends on: 12/31/2025 12:00:00 AM     If this requires a response please respond to the pool ( P MHCX PSC MEDICATION PRE-AUTH).      Thank you please advise patient.

## 2024-11-06 PROBLEM — E66.09 CLASS 1 OBESITY DUE TO EXCESS CALORIES WITH SERIOUS COMORBIDITY AND BODY MASS INDEX (BMI) OF 33.0 TO 33.9 IN ADULT: Chronic | Status: ACTIVE | Noted: 2023-07-06

## 2024-11-06 PROBLEM — E66.811 CLASS 1 OBESITY DUE TO EXCESS CALORIES WITH SERIOUS COMORBIDITY AND BODY MASS INDEX (BMI) OF 33.0 TO 33.9 IN ADULT: Chronic | Status: ACTIVE | Noted: 2023-07-06

## 2024-11-16 SDOH — HEALTH STABILITY: PHYSICAL HEALTH: ON AVERAGE, HOW MANY DAYS PER WEEK DO YOU ENGAGE IN MODERATE TO STRENUOUS EXERCISE (LIKE A BRISK WALK)?: 2 DAYS

## 2024-11-16 SDOH — HEALTH STABILITY: PHYSICAL HEALTH: ON AVERAGE, HOW MANY MINUTES DO YOU ENGAGE IN EXERCISE AT THIS LEVEL?: 20 MIN

## 2024-11-19 ENCOUNTER — OFFICE VISIT (OUTPATIENT)
Dept: PRIMARY CARE CLINIC | Age: 74
End: 2024-11-19

## 2024-11-19 VITALS
DIASTOLIC BLOOD PRESSURE: 66 MMHG | WEIGHT: 197.6 LBS | OXYGEN SATURATION: 98 % | SYSTOLIC BLOOD PRESSURE: 100 MMHG | HEART RATE: 60 BPM | HEIGHT: 65 IN | BODY MASS INDEX: 32.92 KG/M2 | TEMPERATURE: 98.4 F

## 2024-11-19 DIAGNOSIS — I48.0 PAROXYSMAL ATRIAL FIBRILLATION (HCC): ICD-10-CM

## 2024-11-19 DIAGNOSIS — E66.09 CLASS 1 OBESITY DUE TO EXCESS CALORIES WITH SERIOUS COMORBIDITY AND BODY MASS INDEX (BMI) OF 32.0 TO 32.9 IN ADULT: ICD-10-CM

## 2024-11-19 DIAGNOSIS — M48.061 DEGENERATIVE LUMBAR SPINAL STENOSIS: ICD-10-CM

## 2024-11-19 DIAGNOSIS — K13.0 LIP LESION: Primary | ICD-10-CM

## 2024-11-19 DIAGNOSIS — E66.811 CLASS 1 OBESITY DUE TO EXCESS CALORIES WITH SERIOUS COMORBIDITY AND BODY MASS INDEX (BMI) OF 32.0 TO 32.9 IN ADULT: ICD-10-CM

## 2024-11-19 DIAGNOSIS — L43.8 ORAL LICHEN PLANUS: ICD-10-CM

## 2024-11-19 DIAGNOSIS — Z98.890 HISTORY OF ANKLE SURGERY: ICD-10-CM

## 2024-11-19 DIAGNOSIS — M21.372 ACQUIRED LEFT FOOT DROP: ICD-10-CM

## 2024-11-19 DIAGNOSIS — R73.03 PRE-DIABETES: ICD-10-CM

## 2024-11-19 DIAGNOSIS — Z12.31 BREAST CANCER SCREENING BY MAMMOGRAM: ICD-10-CM

## 2024-11-19 DIAGNOSIS — Z23 FLU VACCINE NEED: ICD-10-CM

## 2024-11-19 DIAGNOSIS — I10 ESSENTIAL HYPERTENSION: Chronic | ICD-10-CM

## 2024-11-19 LAB — HBA1C MFR BLD: 5.6 %

## 2024-11-19 SDOH — ECONOMIC STABILITY: FOOD INSECURITY: WITHIN THE PAST 12 MONTHS, YOU WORRIED THAT YOUR FOOD WOULD RUN OUT BEFORE YOU GOT MONEY TO BUY MORE.: NEVER TRUE

## 2024-11-19 SDOH — ECONOMIC STABILITY: FOOD INSECURITY: WITHIN THE PAST 12 MONTHS, THE FOOD YOU BOUGHT JUST DIDN'T LAST AND YOU DIDN'T HAVE MONEY TO GET MORE.: NEVER TRUE

## 2024-11-19 SDOH — ECONOMIC STABILITY: INCOME INSECURITY: HOW HARD IS IT FOR YOU TO PAY FOR THE VERY BASICS LIKE FOOD, HOUSING, MEDICAL CARE, AND HEATING?: NOT HARD AT ALL

## 2024-11-19 NOTE — PROGRESS NOTES
toward a weight loss goal - increasing CV activity, reducing carbs/calories and healthy eating habits. Educational materials for weight loss were provided.  Patient will follow-up with myself at designated time in future.      Return in about 6 months (around 5/19/2025) for MAWV.               Roger Durant, DO     Please note that this chart was generated using dragon dictation software.  Although every effort was made to ensure the accuracy of this automated transcription, some errors in transcription may have occurred.

## 2024-11-21 ENCOUNTER — TELEPHONE (OUTPATIENT)
Dept: PRIMARY CARE CLINIC | Age: 74
End: 2024-11-21

## 2024-11-21 PROBLEM — E66.09 CLASS 1 OBESITY DUE TO EXCESS CALORIES WITH SERIOUS COMORBIDITY AND BODY MASS INDEX (BMI) OF 32.0 TO 32.9 IN ADULT: Status: ACTIVE | Noted: 2023-07-06

## 2024-11-21 PROBLEM — M48.061 DEGENERATIVE LUMBAR SPINAL STENOSIS: Status: ACTIVE | Noted: 2024-11-21

## 2024-11-21 PROBLEM — E66.811 CLASS 1 OBESITY DUE TO EXCESS CALORIES WITH SERIOUS COMORBIDITY AND BODY MASS INDEX (BMI) OF 32.0 TO 32.9 IN ADULT: Status: ACTIVE | Noted: 2023-07-06

## 2024-11-21 PROBLEM — Z98.890 HISTORY OF ANKLE SURGERY: Status: ACTIVE | Noted: 2024-11-21

## 2024-11-21 PROBLEM — M21.372 ACQUIRED LEFT FOOT DROP: Status: ACTIVE | Noted: 2024-11-21

## 2024-11-21 ASSESSMENT — ENCOUNTER SYMPTOMS
SORE THROAT: 0
EYE ITCHING: 0
COUGH: 0
VOMITING: 0
ABDOMINAL PAIN: 0
NAUSEA: 0
SHORTNESS OF BREATH: 0
DIARRHEA: 0
CONSTIPATION: 0
WHEEZING: 0
EYE PAIN: 0

## 2024-11-21 NOTE — TELEPHONE ENCOUNTER
Please contact [facility] to see if patient has been seen there in the last 1-2 years. If so, please have them send notes, especially any nerve conduction study results.   Statement Selected

## 2024-11-21 NOTE — TELEPHONE ENCOUNTER
Called [facility] to see if patient has been seen in the past year or two. The person I spoke with informed me that she has been seen once last year. Before I could ask about the nerve conduction study I was transferred to an in office voice mail. Left message for medical staff requesting patients notes be sent to us and nerve conduction study if they have it.

## 2024-11-22 ENCOUNTER — HOSPITAL ENCOUNTER (OUTPATIENT)
Dept: MAMMOGRAPHY | Age: 74
Discharge: HOME | End: 2024-11-22
Attending: FAMILY MEDICINE
Payer: MEDICARE

## 2024-11-22 DIAGNOSIS — Z12.31 BREAST CANCER SCREENING BY MAMMOGRAM: ICD-10-CM

## 2024-11-22 PROCEDURE — 77067 SCR MAMMO BI INCL CAD: CPT

## 2025-01-21 ENCOUNTER — TELEPHONE (OUTPATIENT)
Dept: CARDIOLOGY CLINIC | Age: 75
End: 2025-01-21

## 2025-01-21 NOTE — TELEPHONE ENCOUNTER
Rx refill request. Pt has switched pharmacies     Medication  rivaroxaban (XARELTO) 20 MG TABS tablet [[id number]]  rivaroxaban (XARELTO) 20 MG TABS tablet [[id number]]    Order Details  Dose: 20 mg Route: Oral Frequency: DAILY   Dispense Quantity: 90 tablet Refills: 3          Sig: Take 1 tablet by mouth daily       Pharmacy  [facility] - P [phone] - F [phone] [[id number]]

## 2025-01-23 ENCOUNTER — TELEPHONE (OUTPATIENT)
Dept: CARDIOLOGY CLINIC | Age: 75
End: 2025-01-23

## 2025-01-23 NOTE — TELEPHONE ENCOUNTER
Spoke with patient, out of samples, sent 1 time 30 day supply to [facility]. Waiting on mail pharmacy.

## 2025-01-23 NOTE — TELEPHONE ENCOUNTER
Patient would like to  two sets of samples of Xarelto.  Can reach pt at [phone]    Medication  rivaroxaban (XARELTO) 20 MG TABS tablet [[id number]]  rivaroxaban (XARELTO) 20 MG TABS tablet [[id number]]    Order Details  Dose: 20 mg Route: Oral Frequency: DAILY   Dispense Quantity: 90 tablet Refills: 3          Sig: Take 1 tablet by mouth daily

## 2025-02-18 NOTE — PROGRESS NOTES
thickness.   Overall left ventricular systolic function appears normal. Ejection fraction   is visually estimated to be 60-65%.   No regional wall motion abnormalities are noted.   Normal diastolic function.   Mild mitral and pulmonic regurgitation.   Mild tricuspid regurgitation with an RVSP of 25mmHg.   IVC size is normal (<2.1cm) and collapses > 50% with respiration consistent   with normal RA pressure (3mmHg).     Stress Test: 7.28.2022   Summary    There is normal isotope uptake at stress and rest. There is no evidence of    myocardial ischemia or scar.    The LVEF Is normal and the LV wall motion is normal.        This is a low risk scan.     Cardiac Angiography:n/a    Problem List:   Patient Active Problem List    Diagnosis Date Noted    Paroxysmal atrial fibrillation - s/p ablation 07/20/2022    History of L ankle surgery 11/21/2024    Acquired left foot drop 11/21/2024    Degenerative lumbar spinal stenosis 11/21/2024    Secondary hypercoagulable state 07/12/2023    Class 1 obesity due to excess calories with serious comorbidity and body mass index (BMI) of 32.0 to 32.9 in adult 07/06/2023    Oral lichen planus 03/24/2021    Pre-diabetes 05/17/2018    Essential hypertension 04/10/2017    Mixed hyperlipidemia 04/10/2017    Atopic dermatitis 08/26/2011    Obstructive sleep apnea syndrome 07/27/2010    Generalized osteoarthrosis, involving multiple sites 07/27/2010    History of colon polyps 07/27/2010        Assessment:   1. Paroxysmal atrial fibrillation - s/p ablation    2. Typical atrial flutter (HCC)    3. On continuous oral anticoagulation    4. Encounter for monitoring flecainide therapy    5. AMNA (obstructive sleep apnea)    6. Essential hypertension        Cardiac HX: Mitra Ornelas is a 74 y.o. woman with a h/o HTN, HLD, palpitations, pAF/RVR (7/2022),  on Xarelto and Toprol, MPI/echo WNL, 1 week CAM (1/2023) 20.6% AF/AFL burden, placed on flecainide 100 mg BID, 1 week CAM (7/2023) 1.1% AFL burden, +

## 2025-03-03 ENCOUNTER — OFFICE VISIT (OUTPATIENT)
Dept: CARDIOLOGY CLINIC | Age: 75
End: 2025-03-03
Payer: COMMERCIAL

## 2025-03-03 VITALS
BODY MASS INDEX: 29.47 KG/M2 | HEART RATE: 63 BPM | WEIGHT: 179 LBS | DIASTOLIC BLOOD PRESSURE: 78 MMHG | SYSTOLIC BLOOD PRESSURE: 122 MMHG

## 2025-03-03 DIAGNOSIS — Z51.81 ENCOUNTER FOR MONITORING FLECAINIDE THERAPY: ICD-10-CM

## 2025-03-03 DIAGNOSIS — Z79.899 ENCOUNTER FOR MONITORING FLECAINIDE THERAPY: ICD-10-CM

## 2025-03-03 DIAGNOSIS — Z79.01 ON CONTINUOUS ORAL ANTICOAGULATION: ICD-10-CM

## 2025-03-03 DIAGNOSIS — I48.3 TYPICAL ATRIAL FLUTTER (HCC): ICD-10-CM

## 2025-03-03 DIAGNOSIS — I48.0 PAROXYSMAL ATRIAL FIBRILLATION (HCC): Primary | ICD-10-CM

## 2025-03-03 DIAGNOSIS — I10 ESSENTIAL HYPERTENSION: ICD-10-CM

## 2025-03-03 DIAGNOSIS — G47.33 OSA (OBSTRUCTIVE SLEEP APNEA): ICD-10-CM

## 2025-03-03 PROCEDURE — 93000 ELECTROCARDIOGRAM COMPLETE: CPT | Performed by: NURSE PRACTITIONER

## 2025-03-03 PROCEDURE — 1160F RVW MEDS BY RX/DR IN RCRD: CPT | Performed by: NURSE PRACTITIONER

## 2025-03-03 PROCEDURE — 3078F DIAST BP <80 MM HG: CPT | Performed by: NURSE PRACTITIONER

## 2025-03-03 PROCEDURE — 99214 OFFICE O/P EST MOD 30 MIN: CPT | Performed by: NURSE PRACTITIONER

## 2025-03-03 PROCEDURE — 1159F MED LIST DOCD IN RCRD: CPT | Performed by: NURSE PRACTITIONER

## 2025-03-03 PROCEDURE — 3074F SYST BP LT 130 MM HG: CPT | Performed by: NURSE PRACTITIONER

## 2025-03-03 PROCEDURE — 1123F ACP DISCUSS/DSCN MKR DOCD: CPT | Performed by: NURSE PRACTITIONER

## 2025-03-13 ENCOUNTER — HOSPITAL ENCOUNTER (OUTPATIENT)
Age: 75
Discharge: HOME | End: 2025-03-15
Payer: COMMERCIAL

## 2025-03-13 ENCOUNTER — RESULTS FOLLOW-UP (OUTPATIENT)
Age: 75
End: 2025-03-13

## 2025-03-13 VITALS — WEIGHT: 179 LBS | HEIGHT: 64 IN | BODY MASS INDEX: 30.56 KG/M2

## 2025-03-13 DIAGNOSIS — I48.0 PAROXYSMAL ATRIAL FIBRILLATION (HCC): ICD-10-CM

## 2025-03-13 LAB
ECHO AO ASC DIAM: 3.6 CM
ECHO AO ASCENDING AORTA INDEX: 1.93 CM/M2
ECHO AO ROOT DIAM: 3.4 CM
ECHO AO ROOT INDEX: 1.82 CM/M2
ECHO AV AREA PEAK VELOCITY: 2.4 CM2
ECHO AV AREA VTI: 2.3 CM2
ECHO AV AREA/BSA PEAK VELOCITY: 1.3 CM2/M2
ECHO AV AREA/BSA VTI: 1.2 CM2/M2
ECHO AV MEAN GRADIENT: 5 MMHG
ECHO AV MEAN VELOCITY: 1.1 M/S
ECHO AV PEAK GRADIENT: 10 MMHG
ECHO AV PEAK VELOCITY: 1.6 M/S
ECHO AV VELOCITY RATIO: 0.94
ECHO AV VTI: 34.4 CM
ECHO BSA: 1.91 M2
ECHO EST RA PRESSURE: 3 MMHG
ECHO IVC EXP: 1.6 CM
ECHO LA AREA 2C: 22.2 CM2
ECHO LA AREA 4C: 25.7 CM2
ECHO LA MAJOR AXIS: 6.2 CM
ECHO LA MINOR AXIS: 5.8 CM
ECHO LA VOL BP: 79 ML (ref 22–52)
ECHO LA VOL MOD A2C: 70 ML (ref 22–52)
ECHO LA VOL MOD A4C: 84 ML (ref 22–52)
ECHO LA VOL/BSA BIPLANE: 42 ML/M2 (ref 16–34)
ECHO LA VOLUME INDEX MOD A2C: 37 ML/M2 (ref 16–34)
ECHO LA VOLUME INDEX MOD A4C: 45 ML/M2 (ref 16–34)
ECHO LV E' LATERAL VELOCITY: 10.3 CM/S
ECHO LV E' SEPTAL VELOCITY: 5.55 CM/S
ECHO LV EDV 3D: 127 ML
ECHO LV EDV INDEX 3D: 68 ML/M2
ECHO LV EF PHYSICIAN: 63 %
ECHO LV EJECTION FRACTION 3D: 66 %
ECHO LV ESV 3D: 43 ML
ECHO LV ESV INDEX 3D: 23 ML/M2
ECHO LV FRACTIONAL SHORTENING: 33 % (ref 28–44)
ECHO LV INTERNAL DIMENSION DIASTOLE INDEX: 2.46 CM/M2
ECHO LV INTERNAL DIMENSION DIASTOLIC: 4.6 CM (ref 3.9–5.3)
ECHO LV INTERNAL DIMENSION SYSTOLIC INDEX: 1.66 CM/M2
ECHO LV INTERNAL DIMENSION SYSTOLIC: 3.1 CM
ECHO LV IVSD: 1 CM (ref 0.6–0.9)
ECHO LV MASS 2D: 158.8 G (ref 67–162)
ECHO LV MASS 3D INDEX: 74.9 G/M2
ECHO LV MASS 3D: 140 G
ECHO LV MASS INDEX 2D: 84.9 G/M2 (ref 43–95)
ECHO LV POSTERIOR WALL DIASTOLIC: 1 CM (ref 0.6–0.9)
ECHO LV RELATIVE WALL THICKNESS RATIO: 0.43
ECHO LVOT AREA: 2.5 CM2
ECHO LVOT AV VTI INDEX: 0.91
ECHO LVOT DIAM: 1.8 CM
ECHO LVOT MEAN GRADIENT: 4 MMHG
ECHO LVOT PEAK GRADIENT: 9 MMHG
ECHO LVOT PEAK VELOCITY: 1.5 M/S
ECHO LVOT STROKE VOLUME INDEX: 42.4 ML/M2
ECHO LVOT SV: 79.4 ML
ECHO LVOT VTI: 31.2 CM
ECHO MV A VELOCITY: 0.83 M/S
ECHO MV E DECELERATION TIME (DT): 214 MS
ECHO MV E VELOCITY: 1 M/S
ECHO MV E/A RATIO: 1.2
ECHO MV E/E' LATERAL: 9.71
ECHO MV E/E' RATIO (AVERAGED): 13.86
ECHO MV E/E' SEPTAL: 18.02
ECHO PV ACCELERATION TIME (AT): 121 MS
ECHO PV MAX VELOCITY: 0.9 M/S
ECHO PV PEAK GRADIENT: 4 MMHG
ECHO RA AREA 4C: 19 CM2
ECHO RA END SYSTOLIC VOLUME APICAL 4 CHAMBER INDEX BSA: 32 ML/M2
ECHO RA VOLUME: 59 ML
ECHO RIGHT VENTRICULAR SYSTOLIC PRESSURE (RVSP): 34 MMHG
ECHO RV FREE WALL PEAK S': 10.8 CM/S
ECHO RV TAPSE: 2.7 CM (ref 1.7–?)
ECHO TV REGURGITANT MAX VELOCITY: 2.77 M/S
ECHO TV REGURGITANT PEAK GRADIENT: 31 MMHG

## 2025-03-13 PROCEDURE — 93306 TTE W/DOPPLER COMPLETE: CPT

## 2025-03-17 NOTE — TELEPHONE ENCOUNTER
----- Message from OMARI Ferrell CNP sent at 3/13/2025  5:07 PM EDT -----  Echo unchanged from previous.  Her EF is 60 to 65%.  She has mild left atrial and right atrial enlargement.  Would not do any further cardiac testing at this point.

## 2025-04-09 ENCOUNTER — OFFICE VISIT (OUTPATIENT)
Dept: CARDIOLOGY CLINIC | Age: 75
End: 2025-04-09
Payer: COMMERCIAL

## 2025-04-09 VITALS
OXYGEN SATURATION: 98 % | SYSTOLIC BLOOD PRESSURE: 142 MMHG | BODY MASS INDEX: 30.38 KG/M2 | WEIGHT: 177 LBS | HEART RATE: 80 BPM | DIASTOLIC BLOOD PRESSURE: 82 MMHG

## 2025-04-09 DIAGNOSIS — I48.0 PAROXYSMAL ATRIAL FIBRILLATION (HCC): Primary | ICD-10-CM

## 2025-04-09 DIAGNOSIS — I10 HYPERTENSION, UNSPECIFIED TYPE: ICD-10-CM

## 2025-04-09 PROCEDURE — 1159F MED LIST DOCD IN RCRD: CPT | Performed by: INTERNAL MEDICINE

## 2025-04-09 PROCEDURE — 1123F ACP DISCUSS/DSCN MKR DOCD: CPT | Performed by: INTERNAL MEDICINE

## 2025-04-09 PROCEDURE — 1160F RVW MEDS BY RX/DR IN RCRD: CPT | Performed by: INTERNAL MEDICINE

## 2025-04-09 PROCEDURE — 3077F SYST BP >= 140 MM HG: CPT | Performed by: INTERNAL MEDICINE

## 2025-04-09 PROCEDURE — 99214 OFFICE O/P EST MOD 30 MIN: CPT | Performed by: INTERNAL MEDICINE

## 2025-04-09 PROCEDURE — 93000 ELECTROCARDIOGRAM COMPLETE: CPT | Performed by: INTERNAL MEDICINE

## 2025-04-09 PROCEDURE — G2211 COMPLEX E/M VISIT ADD ON: HCPCS | Performed by: INTERNAL MEDICINE

## 2025-04-09 PROCEDURE — 3079F DIAST BP 80-89 MM HG: CPT | Performed by: INTERNAL MEDICINE

## 2025-04-09 RX ORDER — TRIAMTERENE AND HYDROCHLOROTHIAZIDE 37.5; 25 MG/1; MG/1
1 TABLET ORAL DAILY PRN
Qty: 90 TABLET | Refills: 3
Start: 2025-04-09

## 2025-04-09 NOTE — PATIENT INSTRUCTIONS
Thank you for choosing [facility] for your cardiac care.    During your visit today, we reviewed and confirmed your cardiac medications along with  medication prescribed by your other healthcare team members. Please be sure to discuss any  changes to medication with your providers.    Please bring a list of ALL medications (or the bottles) with you to EVERY appointment.  Also include vitamins and over-the-counter medications.    If you need refills for any cardiac medications, please call your pharmacy and they will reach out to us electronically.    Did your provider order testing today? If yes, then you will receive your results in three  possible ways. You can receive a [employer] message, a phone call, or letter in the mail. Please  note, if you are an active [employer] user, some of your testing will be available within 1-2 days.    Finally, please know that it is good for your heart to exercise and follow a healthy, low-fat diet  as advised by your physician and health care providers.    If you are experiencing a medical emergency, please call 911 immediately.    It's easy to register for a [employer] account if you don't already have one. With a [employer]  account you can manage your health record, view test results, schedule appointments and  more.     Dr. Jara's clinical staff can be reached at the following phone number: [phone]    If any cardiac testing is ordered, please contact central scheduling at [phone] to get your test scheduled.     Take Maxizide as needed for swelling

## 2025-04-09 NOTE — PROGRESS NOTES
Component Value Date    CHOL 179 12/22/2020    CHOL 199 03/02/2019    CHOL 178 05/18/2018     Lab Results   Component Value Date    TRIG 146 12/22/2020    TRIG 215 (H) 03/02/2019    TRIG 121 05/18/2018     Lab Results   Component Value Date    HDL 48 09/08/2023    HDL 57 05/26/2022    HDL 44 12/22/2020     No components found for: \"LDLCHOLESTEROL\", \"LDLCALC\"  Lab Results   Component Value Date    VLDL 31 09/08/2023    VLDL 32 05/26/2022    VLDL 29 12/22/2020     No results found for: \"CHOLHDLRATIO\"    Lab Results   Component Value Date    INR 1.40 (H) 05/31/2024    INR 2.19 (H) 05/24/2024    INR 0.96 07/20/2022    PROTIME 24.4 (H) 05/24/2024    PROTIME 12.7 07/20/2022    PROTIME 11.8 01/20/2018       The 10-year ASCVD risk score (Luke MARTINEZ, et al., 2019) is: 15.6%    Values used to calculate the score:      Age: 74 years      Sex: Female      Is Non- : No      Diabetic: No      Tobacco smoker: No      Systolic Blood Pressure: 122 mmHg      Is BP treated: Yes      HDL Cholesterol: 48 mg/dL      Total Cholesterol: 178 mg/dL      Imaging:     Last ECG (if available, Personally interpreted):  Sinus rhythm with first-degree AV delay  Last Monitor/Holter (if available):    Last Stress (if available):7/28/22  There is normal isotope uptake at stress and rest. There is no evidence of  myocardial ischemia or scar.  The LVEF Is normal and the LV wall motion is normal.  This is a low risk scan.    Last Cath (if available):    Last TTE/WOLFGANG(if available):3/13/25  Left Ventricle: Normal left ventricular systolic function with a visually estimated EF of 60 - 65%. EF 3D is 66%. Left ventricle size is normal. Mild basal septal thickening.  Mild LVOT turbulence seen. Normal wall motion. Diastolic dysfunction present with increased LAP with normal LV EF.  Mitral Valve: Mild regurgitation with a centrally directed jet.  Tricuspid Valve: Normal RVSP. The estimated RVSP is 34 mmHg.  Left Atrium: Left atrium is 
21 mm and decreases greater than 50% during inspiration; therefore the estimated right atrial pressure is normal (~3 mmHg). IVC size is normal.  Image quality is good.    Last CMR  (if available):    Last Coronary Artery Calcium Score:                 All questions and concerns were addressed to the patient/family. Alternatives to my treatment were discussed. The note was completed using EMR. Every effort was made to ensure accuracy; however, inadvertent computerized transcription errors may be present.    Thank you for allowing me to participate in the care of your patient.    I, Juan Jara MD, personally performed the services prescribed in this documentation as scribed by Ms. Cheryl Silverio RN in my presence and it is both accurate and complete.       Juan Jara MD  [facility]  [address], [address], [facility] [address]  Tel   Fax

## 2025-05-07 PROBLEM — E66.09 CLASS 1 OBESITY DUE TO EXCESS CALORIES WITH SERIOUS COMORBIDITY AND BODY MASS INDEX (BMI) OF 32.0 TO 32.9 IN ADULT: Status: RESOLVED | Noted: 2023-07-06 | Resolved: 2025-05-07

## 2025-05-07 PROBLEM — I48.0 PAROXYSMAL ATRIAL FIBRILLATION (HCC): Chronic | Status: ACTIVE | Noted: 2022-07-20

## 2025-05-07 PROBLEM — E66.811 CLASS 1 OBESITY DUE TO EXCESS CALORIES WITH SERIOUS COMORBIDITY AND BODY MASS INDEX (BMI) OF 32.0 TO 32.9 IN ADULT: Status: RESOLVED | Noted: 2023-07-06 | Resolved: 2025-05-07

## 2025-05-16 SDOH — HEALTH STABILITY: PHYSICAL HEALTH: ON AVERAGE, HOW MANY MINUTES DO YOU ENGAGE IN EXERCISE AT THIS LEVEL?: 10 MIN

## 2025-05-16 SDOH — ECONOMIC STABILITY: FOOD INSECURITY: WITHIN THE PAST 12 MONTHS, THE FOOD YOU BOUGHT JUST DIDN'T LAST AND YOU DIDN'T HAVE MONEY TO GET MORE.: NEVER TRUE

## 2025-05-16 SDOH — HEALTH STABILITY: PHYSICAL HEALTH: ON AVERAGE, HOW MANY DAYS PER WEEK DO YOU ENGAGE IN MODERATE TO STRENUOUS EXERCISE (LIKE A BRISK WALK)?: 1 DAY

## 2025-05-16 SDOH — ECONOMIC STABILITY: TRANSPORTATION INSECURITY
IN THE PAST 12 MONTHS, HAS THE LACK OF TRANSPORTATION KEPT YOU FROM MEDICAL APPOINTMENTS OR FROM GETTING MEDICATIONS?: NO

## 2025-05-16 SDOH — ECONOMIC STABILITY: INCOME INSECURITY: IN THE LAST 12 MONTHS, WAS THERE A TIME WHEN YOU WERE NOT ABLE TO PAY THE MORTGAGE OR RENT ON TIME?: NO

## 2025-05-16 SDOH — ECONOMIC STABILITY: FOOD INSECURITY: WITHIN THE PAST 12 MONTHS, YOU WORRIED THAT YOUR FOOD WOULD RUN OUT BEFORE YOU GOT MONEY TO BUY MORE.: SOMETIMES TRUE

## 2025-05-16 ASSESSMENT — PATIENT HEALTH QUESTIONNAIRE - PHQ9
2. FEELING DOWN, DEPRESSED OR HOPELESS: NOT AT ALL
SUM OF ALL RESPONSES TO PHQ QUESTIONS 1-9: 0
SUM OF ALL RESPONSES TO PHQ QUESTIONS 1-9: 0
1. LITTLE INTEREST OR PLEASURE IN DOING THINGS: NOT AT ALL
SUM OF ALL RESPONSES TO PHQ QUESTIONS 1-9: 0
SUM OF ALL RESPONSES TO PHQ QUESTIONS 1-9: 0

## 2025-05-16 ASSESSMENT — LIFESTYLE VARIABLES
HOW MANY STANDARD DRINKS CONTAINING ALCOHOL DO YOU HAVE ON A TYPICAL DAY: 1
HOW OFTEN DO YOU HAVE SIX OR MORE DRINKS ON ONE OCCASION: 1
HOW OFTEN DO YOU HAVE A DRINK CONTAINING ALCOHOL: 2
HOW MANY STANDARD DRINKS CONTAINING ALCOHOL DO YOU HAVE ON A TYPICAL DAY: 1 OR 2
HOW OFTEN DO YOU HAVE A DRINK CONTAINING ALCOHOL: MONTHLY OR LESS

## 2025-05-19 ENCOUNTER — OFFICE VISIT (OUTPATIENT)
Dept: PRIMARY CARE CLINIC | Age: 75
End: 2025-05-19
Payer: COMMERCIAL

## 2025-05-19 VITALS
DIASTOLIC BLOOD PRESSURE: 73 MMHG | BODY MASS INDEX: 29.6 KG/M2 | OXYGEN SATURATION: 100 % | HEART RATE: 68 BPM | WEIGHT: 173.4 LBS | HEIGHT: 64 IN | SYSTOLIC BLOOD PRESSURE: 133 MMHG | TEMPERATURE: 97.7 F

## 2025-05-19 DIAGNOSIS — I48.0 PAROXYSMAL ATRIAL FIBRILLATION (HCC): Chronic | ICD-10-CM

## 2025-05-19 DIAGNOSIS — Z85.828 HISTORY OF SKIN CANCER: ICD-10-CM

## 2025-05-19 DIAGNOSIS — I10 ESSENTIAL HYPERTENSION: Chronic | ICD-10-CM

## 2025-05-19 DIAGNOSIS — Z98.890 HISTORY OF ANKLE SURGERY: ICD-10-CM

## 2025-05-19 DIAGNOSIS — R73.03 PRE-DIABETES: ICD-10-CM

## 2025-05-19 DIAGNOSIS — Z00.00 MEDICARE ANNUAL WELLNESS VISIT, SUBSEQUENT: Primary | ICD-10-CM

## 2025-05-19 DIAGNOSIS — M48.061 DEGENERATIVE LUMBAR SPINAL STENOSIS: ICD-10-CM

## 2025-05-19 DIAGNOSIS — M21.372 ACQUIRED LEFT FOOT DROP: ICD-10-CM

## 2025-05-19 PROCEDURE — G0439 PPPS, SUBSEQ VISIT: HCPCS | Performed by: FAMILY MEDICINE

## 2025-05-19 PROCEDURE — 1123F ACP DISCUSS/DSCN MKR DOCD: CPT | Performed by: FAMILY MEDICINE

## 2025-05-19 PROCEDURE — 3078F DIAST BP <80 MM HG: CPT | Performed by: FAMILY MEDICINE

## 2025-05-19 PROCEDURE — 1160F RVW MEDS BY RX/DR IN RCRD: CPT | Performed by: FAMILY MEDICINE

## 2025-05-19 PROCEDURE — 1159F MED LIST DOCD IN RCRD: CPT | Performed by: FAMILY MEDICINE

## 2025-05-19 PROCEDURE — 3075F SYST BP GE 130 - 139MM HG: CPT | Performed by: FAMILY MEDICINE

## 2025-05-19 NOTE — PATIENT INSTRUCTIONS

## 2025-05-19 NOTE — PROGRESS NOTES
Medicare Annual Wellness Visit    Mitra Ornelas is here for Medicare AWV (Fasting )    Assessment & Plan   Medicare annual wellness visit, subsequent  General wellness exam. Reviewed chart for past hx and updated today. Counseled on age appropriate health guidance and discussed screening recommendations. Vaccinations reviewed and discussed. All questions answered  History of skin cancer - lower lip (2025)  Acquired left foot drop  -     Rodriguez Schmidt MD, Orthopedic Surgery (Foot, Ankle), [facility]  History of L ankle surgery  -     Rodriguez Schmidt MD, Orthopedic Surgery (Foot, Ankle), [facility]  Degenerative lumbar spinal stenosis  -     Rodriguez Schmidt MD, Orthopedic Surgery (Foot, Ankle), [facility]  Essential hypertension  Pre-diabetes  Paroxysmal atrial fibrillation - s/p ablation     Return in about 1 year (around 5/20/2026) for MAWV.     Subjective   The following acute and/or chronic problems were also addressed today:  Update since last visit include confirmed skin CA to lower lip - has been removed. Continues to have issues with L ankle/foot.     Wt Readings from Last 5 Encounters:   05/19/25 78.7 kg (173 lb 6.4 oz)   05/07/25 78.3 kg (172 lb 9.6 oz)   04/09/25 80.3 kg (177 lb)   03/13/25 81.2 kg (179 lb)   03/03/25 81.2 kg (179 lb)     Patient's complete Health Risk Assessment and screening values have been reviewed and are found in Flowsheets. The following problems were reviewed today and where indicated follow up appointments were made and/or referrals ordered.    Positive Risk Factor Screenings with Interventions:        Drug Use:   DAST-10 Score: (Patient-Rptd) 1  Interpretation: 1-2 indicates low level use. Recommendation: monitor and re-assess at a later date  Interventions:  Patient declined any further intervention or treatment         Inactivity:  On average, how many days per week do you engage in moderate to strenuous exercise (like a brisk walk)?:

## 2025-06-12 ENCOUNTER — TELEPHONE (OUTPATIENT)
Dept: PRIMARY CARE CLINIC | Age: 75
End: 2025-06-12

## 2025-06-12 NOTE — TELEPHONE ENCOUNTER
Patient called and stated that she will be going on vacation next Wednesday,wanted to know if she is able to be prescribed 10 sleeping pills states she'll be sharing a room with somebody.

## 2025-06-13 ENCOUNTER — OFFICE VISIT (OUTPATIENT)
Dept: PRIMARY CARE CLINIC | Age: 75
End: 2025-06-13
Payer: COMMERCIAL

## 2025-06-13 VITALS
BODY MASS INDEX: 29.36 KG/M2 | WEIGHT: 172 LBS | SYSTOLIC BLOOD PRESSURE: 135 MMHG | OXYGEN SATURATION: 98 % | TEMPERATURE: 98.3 F | HEART RATE: 60 BPM | DIASTOLIC BLOOD PRESSURE: 78 MMHG

## 2025-06-13 DIAGNOSIS — D68.69 SECONDARY HYPERCOAGULABLE STATE: ICD-10-CM

## 2025-06-13 DIAGNOSIS — F51.01 PRIMARY INSOMNIA: Primary | ICD-10-CM

## 2025-06-13 DIAGNOSIS — I48.0 PAROXYSMAL ATRIAL FIBRILLATION (HCC): Chronic | ICD-10-CM

## 2025-06-13 DIAGNOSIS — I10 ESSENTIAL HYPERTENSION: Chronic | ICD-10-CM

## 2025-06-13 PROCEDURE — 99214 OFFICE O/P EST MOD 30 MIN: CPT | Performed by: FAMILY MEDICINE

## 2025-06-13 PROCEDURE — G2211 COMPLEX E/M VISIT ADD ON: HCPCS | Performed by: FAMILY MEDICINE

## 2025-06-13 PROCEDURE — 1160F RVW MEDS BY RX/DR IN RCRD: CPT | Performed by: FAMILY MEDICINE

## 2025-06-13 PROCEDURE — 1123F ACP DISCUSS/DSCN MKR DOCD: CPT | Performed by: FAMILY MEDICINE

## 2025-06-13 PROCEDURE — 3075F SYST BP GE 130 - 139MM HG: CPT | Performed by: FAMILY MEDICINE

## 2025-06-13 PROCEDURE — 3078F DIAST BP <80 MM HG: CPT | Performed by: FAMILY MEDICINE

## 2025-06-13 PROCEDURE — 1159F MED LIST DOCD IN RCRD: CPT | Performed by: FAMILY MEDICINE

## 2025-06-13 RX ORDER — ZOLPIDEM TARTRATE 5 MG/1
5 TABLET ORAL NIGHTLY PRN
Qty: 7 TABLET | Refills: 0 | Status: SHIPPED | OUTPATIENT
Start: 2025-06-13 | End: 2025-06-13

## 2025-06-13 RX ORDER — ZOLPIDEM TARTRATE 5 MG/1
5 TABLET ORAL NIGHTLY PRN
Qty: 10 TABLET | Refills: 0 | Status: SHIPPED | OUTPATIENT
Start: 2025-06-13 | End: 2025-06-23

## 2025-06-13 NOTE — PROGRESS NOTES
[facility]  [facility]  [address] [place] [id number]  Dept: [phone]  Dept Fax: [phone]     6/13/2025      Mitra Ornelas   1950     Chief Complaint   Patient presents with    Discuss Medications     Discuss help with sleeping while away on vacation        HPI  Pt comes in today for discussion of sleep aid medication. Routinely sleeps well 1 in every 4 nights. She will be sleeping with others in room upcoming for family trip, and would like something to aid with sleep. Trazodone only in past - maybe altered mentation. Has tried melatonin - mild improvements, etc. No other concerns.         5/16/2025     9:46 AM 5/1/2024     1:21 PM 8/1/2023     4:29 PM 2/6/2023     1:00 PM 5/26/2022     2:24 PM 2/18/2021     3:38 PM   PHQ Scores   PHQ2 Score 0  0 0 0 0 0   PHQ9 Score 0  0 0 2 2 0       Patient-reported     Interpretation of Total Score Depression Severity: 1-4 = Minimal depression, 5-9 = Mild depression, 10-14 = Moderate depression, 15-19 = Moderately severe depression, 20-27 = Severe depression     Prior to Visit Medications    Medication Sig Taking? Authorizing Provider   triamterene-hydroCHLOROthiazide (MAXZIDE-25) 37.5-25 MG per tablet Take 1 tablet by mouth daily as needed (swelling)  Patient not taking: Reported on 6/13/2025  Juan Jara MD   rivaroxaban (XARELTO) 20 MG TABS tablet Take 1 tablet by mouth daily Yes Juan Jara MD   clotrimazole-betamethasone (LOTRISONE) 1-0.05 % cream Apply topically 2 times daily Yes Shelby Jennings APRN - CNP   alclomethasone (ACLOVATE) 0.05 % cream Apply topically 2 times daily Yes Shelby Jennings APRN - CNP   ketoconazole (NIZORAL) 2 % cream Apply topically 2 times daily  Patient not taking: Reported on 6/13/2025  Shelby Jennings APRN - CNP   flecainide (TAMBOCOR) 100 MG tablet Take 0.5 tablets by mouth 2 times daily Yes Juan Jara MD   metoprolol succinate (TOPROL

## 2025-06-16 ASSESSMENT — ENCOUNTER SYMPTOMS
NAUSEA: 0
COUGH: 0
SHORTNESS OF BREATH: 0
ABDOMINAL PAIN: 0
SORE THROAT: 0

## 2025-07-08 ENCOUNTER — OFFICE VISIT (OUTPATIENT)
Dept: PRIMARY CARE CLINIC | Age: 75
End: 2025-07-08
Payer: COMMERCIAL

## 2025-07-08 VITALS
HEART RATE: 54 BPM | SYSTOLIC BLOOD PRESSURE: 113 MMHG | TEMPERATURE: 97.7 F | OXYGEN SATURATION: 98 % | DIASTOLIC BLOOD PRESSURE: 69 MMHG | BODY MASS INDEX: 28.78 KG/M2 | WEIGHT: 168.6 LBS

## 2025-07-08 DIAGNOSIS — I10 ESSENTIAL HYPERTENSION: ICD-10-CM

## 2025-07-08 DIAGNOSIS — R07.89 ANTERIOR CHEST WALL PAIN: Primary | ICD-10-CM

## 2025-07-08 DIAGNOSIS — I48.0 PAROXYSMAL ATRIAL FIBRILLATION (HCC): ICD-10-CM

## 2025-07-08 DIAGNOSIS — D68.69 SECONDARY HYPERCOAGULABLE STATE: ICD-10-CM

## 2025-07-08 PROCEDURE — G2211 COMPLEX E/M VISIT ADD ON: HCPCS | Performed by: FAMILY MEDICINE

## 2025-07-08 PROCEDURE — 1123F ACP DISCUSS/DSCN MKR DOCD: CPT | Performed by: FAMILY MEDICINE

## 2025-07-08 PROCEDURE — 3074F SYST BP LT 130 MM HG: CPT | Performed by: FAMILY MEDICINE

## 2025-07-08 PROCEDURE — 99213 OFFICE O/P EST LOW 20 MIN: CPT | Performed by: FAMILY MEDICINE

## 2025-07-08 PROCEDURE — 1159F MED LIST DOCD IN RCRD: CPT | Performed by: FAMILY MEDICINE

## 2025-07-08 PROCEDURE — 1160F RVW MEDS BY RX/DR IN RCRD: CPT | Performed by: FAMILY MEDICINE

## 2025-07-08 PROCEDURE — 3078F DIAST BP <80 MM HG: CPT | Performed by: FAMILY MEDICINE

## 2025-07-08 NOTE — PROGRESS NOTES
Sister     High Blood Pressure Brother     High Blood Pressure Brother     Arthritis Maternal Aunt     Breast Cancer Neg Hx         Patient's past medical history, surgical history, family history, medications, and allergies were all reviewed and updated as appropriate today.    Review of systems per HPI above, otherwise negative.    /69 (BP Cuff Size: Medium Adult)   Pulse 54   Temp 97.7 °F (36.5 °C) (Oral)   Wt 76.5 kg (168 lb 9.6 oz)   SpO2 98% Comment: room air  BMI 28.78 kg/m²      Physical Exam  Vitals reviewed.   Constitutional:       General: She is not in acute distress.  HENT:      Head: Normocephalic.      Nose: Nose normal.      Mouth/Throat:      Mouth: Mucous membranes are moist.   Eyes:      Extraocular Movements: Extraocular movements intact.      Pupils: Pupils are equal, round, and reactive to light.   Cardiovascular:      Rate and Rhythm: Regular rhythm. Bradycardia present.      Heart sounds: No murmur heard.  Pulmonary:      Effort: Pulmonary effort is normal.      Breath sounds: Normal breath sounds. No wheezing.   Chest:       Abdominal:      General: Bowel sounds are normal.      Palpations: Abdomen is soft.      Tenderness: There is no abdominal tenderness.   Musculoskeletal:         General: No swelling or deformity.      Cervical back: Neck supple.   Lymphadenopathy:      Cervical: No cervical adenopathy.   Skin:     General: Skin is warm and dry.      Findings: No rash.   Neurological:      Mental Status: She is alert and oriented to person, place, and time.      Cranial Nerves: No cranial nerve deficit.   Psychiatric:         Mood and Affect: Mood normal.         Behavior: Behavior is cooperative.         Assessment:  Encounter Diagnoses   Name Primary?    Anterior chest wall pain - L sided Yes    Essential hypertension     Paroxysmal atrial fibrillation - s/p ablation     Secondary hypercoagulable state        Plan:  1. Anterior chest wall pain - L sided  Acute anterior L

## (undated) DEVICE — Device

## (undated) DEVICE — PATCH REF EXT FOR CARTO 3 SYS (EA = 6 PACKS)

## (undated) DEVICE — INTRODUCER SHTH AD L11CM DIA 9FR STD BLK S STL PERIPH BRACH

## (undated) DEVICE — INTRODUCER SHTH STD 8 FRX11 CM FLX KINK RESIST CANN AVNT +

## (undated) DEVICE — SNARE ENDOSCP L240CM LOOP W13MM DIA2.4MM SHT THROW SM OVL

## (undated) DEVICE — SYSTEM CLOSURE 6-12 FR VEN VASC VASCADE MVP

## (undated) DEVICE — TUBING PMP FOR CARTO SYS SMARTABLATE

## (undated) DEVICE — DEVICE THERM REG SIL ESOPH MLTI LUMEN COLDER PLC CONN 5 DEG

## (undated) DEVICE — SOUNDSTAR REPROC ECO 8F DGNSTC ULTRSND CATH USE GE IMGNG SSTM

## (undated) DEVICE — CATHETER EP 7FR L115CM 2-8-2MM SPC TIP 2MM 10 ELECTRD D CRV

## (undated) DEVICE — 1 X VERSACROSS STEERABLE SHEATH (INCLUDING  1 X DILATOR AND 1 X J-TIP GUIDEWIRE); 1 X VERSACROSS RF WIRE (INCLUDING 1 X CONNECTOR CABLE (SINGLE USE)); 1 X DISPERSIVE ELECTRODE: Brand: VERSACROSS STEERABLE ACCESS SOLUTION

## (undated) DEVICE — FORCEPS BX 240CM 2.4MM L NDL RAD JAW 4 M00513334

## (undated) DEVICE — CATHETER MAP D CRV 3-3-3-3-3 MM SPC GALAXY OCTARAY

## (undated) DEVICE — CATHETER ABLAT 8FR L115CM 1-6-2MM SPC TIP 3.5MM DF CRV